# Patient Record
Sex: FEMALE | Race: WHITE | Employment: UNEMPLOYED | URBAN - METROPOLITAN AREA
[De-identification: names, ages, dates, MRNs, and addresses within clinical notes are randomized per-mention and may not be internally consistent; named-entity substitution may affect disease eponyms.]

---

## 2017-01-09 ENCOUNTER — TELEPHONE (OUTPATIENT)
Dept: INTERNAL MEDICINE CLINIC | Age: 82
End: 2017-01-09

## 2017-01-09 NOTE — TELEPHONE ENCOUNTER
Returned patient's call. She states that her sciatica symptoms that she was treated for on 10/14/16 have never improved with medrol dosepack and flexeril. Asking if there is anything else to do. Will forward to PCP.

## 2017-01-11 NOTE — TELEPHONE ENCOUNTER
Please give this patient a referral to Dr. Lora Beck for further evaluation of her spine. Mich Dennis

## 2017-07-17 ENCOUNTER — TELEPHONE (OUTPATIENT)
Dept: INTERNAL MEDICINE CLINIC | Age: 82
End: 2017-07-17

## 2019-08-14 ENCOUNTER — APPOINTMENT (OUTPATIENT)
Dept: CT IMAGING | Age: 84
End: 2019-08-14
Attending: EMERGENCY MEDICINE
Payer: MEDICARE

## 2019-08-14 ENCOUNTER — HOSPITAL ENCOUNTER (EMERGENCY)
Age: 84
Discharge: HOME | End: 2019-08-14
Attending: EMERGENCY MEDICINE | Admitting: EMERGENCY MEDICINE
Payer: MEDICARE

## 2019-08-14 VITALS
SYSTOLIC BLOOD PRESSURE: 185 MMHG | HEART RATE: 88 BPM | DIASTOLIC BLOOD PRESSURE: 82 MMHG | RESPIRATION RATE: 23 BRPM | OXYGEN SATURATION: 100 % | BODY MASS INDEX: 34.1 KG/M2 | TEMPERATURE: 98.1 F | WEIGHT: 152.12 LBS

## 2019-08-14 DIAGNOSIS — R47.9 TRANSIENT SPEECH DISTURBANCE: Primary | ICD-10-CM

## 2019-08-14 LAB
ALBUMIN SERPL-MCNC: 3.7 G/DL (ref 3.5–5)
ALBUMIN/GLOB SERPL: 1 {RATIO} (ref 1.1–2.2)
ALP SERPL-CCNC: 68 U/L (ref 45–117)
ALT SERPL-CCNC: 15 U/L (ref 12–78)
ANION GAP SERPL CALC-SCNC: 8 MMOL/L (ref 5–15)
APPEARANCE UR: CLEAR
AST SERPL-CCNC: 14 U/L (ref 15–37)
ATRIAL RATE: 91 BPM
BACTERIA URNS QL MICRO: ABNORMAL /HPF
BASOPHILS # BLD: 0.1 K/UL (ref 0–0.1)
BASOPHILS NFR BLD: 1 % (ref 0–1)
BILIRUB SERPL-MCNC: 0.5 MG/DL (ref 0.2–1)
BILIRUB UR QL: NEGATIVE
BUN SERPL-MCNC: 18 MG/DL (ref 6–20)
BUN/CREAT SERPL: 16 (ref 12–20)
CALCIUM SERPL-MCNC: 9.7 MG/DL (ref 8.5–10.1)
CALCULATED P AXIS, ECG09: 32 DEGREES
CALCULATED R AXIS, ECG10: -20 DEGREES
CALCULATED T AXIS, ECG11: 97 DEGREES
CHLORIDE SERPL-SCNC: 104 MMOL/L (ref 97–108)
CO2 SERPL-SCNC: 28 MMOL/L (ref 21–32)
COLOR UR: ABNORMAL
CREAT SERPL-MCNC: 1.1 MG/DL (ref 0.55–1.02)
DIAGNOSIS, 93000: NORMAL
DIFFERENTIAL METHOD BLD: NORMAL
EOSINOPHIL # BLD: 0.1 K/UL (ref 0–0.4)
EOSINOPHIL NFR BLD: 1 % (ref 0–7)
EPITH CASTS URNS QL MICRO: ABNORMAL /LPF
ERYTHROCYTE [DISTWIDTH] IN BLOOD BY AUTOMATED COUNT: 12.3 % (ref 11.5–14.5)
GLOBULIN SER CALC-MCNC: 3.8 G/DL (ref 2–4)
GLUCOSE SERPL-MCNC: 155 MG/DL (ref 65–100)
GLUCOSE UR STRIP.AUTO-MCNC: NEGATIVE MG/DL
HCT VFR BLD AUTO: 38.2 % (ref 35–47)
HGB BLD-MCNC: 12.6 G/DL (ref 11.5–16)
HGB UR QL STRIP: NEGATIVE
IMM GRANULOCYTES # BLD AUTO: 0 K/UL (ref 0–0.04)
IMM GRANULOCYTES NFR BLD AUTO: 0 % (ref 0–0.5)
KETONES UR QL STRIP.AUTO: NEGATIVE MG/DL
LEUKOCYTE ESTERASE UR QL STRIP.AUTO: ABNORMAL
LYMPHOCYTES # BLD: 1.7 K/UL (ref 0.8–3.5)
LYMPHOCYTES NFR BLD: 29 % (ref 12–49)
MCH RBC QN AUTO: 31.5 PG (ref 26–34)
MCHC RBC AUTO-ENTMCNC: 33 G/DL (ref 30–36.5)
MCV RBC AUTO: 95.5 FL (ref 80–99)
MONOCYTES # BLD: 0.6 K/UL (ref 0–1)
MONOCYTES NFR BLD: 10 % (ref 5–13)
NEUTS SEG # BLD: 3.4 K/UL (ref 1.8–8)
NEUTS SEG NFR BLD: 59 % (ref 32–75)
NITRITE UR QL STRIP.AUTO: NEGATIVE
NRBC # BLD: 0 K/UL (ref 0–0.01)
NRBC BLD-RTO: 0 PER 100 WBC
OTHER,OTHU: ABNORMAL
P-R INTERVAL, ECG05: 178 MS
PH UR STRIP: 7.5 [PH] (ref 5–8)
PLATELET # BLD AUTO: 194 K/UL (ref 150–400)
PMV BLD AUTO: 11.8 FL (ref 8.9–12.9)
POTASSIUM SERPL-SCNC: 3.7 MMOL/L (ref 3.5–5.1)
PROT SERPL-MCNC: 7.5 G/DL (ref 6.4–8.2)
PROT UR STRIP-MCNC: NEGATIVE MG/DL
Q-T INTERVAL, ECG07: 354 MS
QRS DURATION, ECG06: 82 MS
QTC CALCULATION (BEZET), ECG08: 435 MS
RBC # BLD AUTO: 4 M/UL (ref 3.8–5.2)
RBC #/AREA URNS HPF: ABNORMAL /HPF (ref 0–5)
SODIUM SERPL-SCNC: 140 MMOL/L (ref 136–145)
SP GR UR REFRACTOMETRY: 1.01 (ref 1–1.03)
UA: UC IF INDICATED,UAUC: ABNORMAL
UROBILINOGEN UR QL STRIP.AUTO: 0.2 EU/DL (ref 0.2–1)
VENTRICULAR RATE, ECG03: 91 BPM
WBC # BLD AUTO: 5.8 K/UL (ref 3.6–11)
WBC URNS QL MICRO: ABNORMAL /HPF (ref 0–4)

## 2019-08-14 PROCEDURE — 87086 URINE CULTURE/COLONY COUNT: CPT

## 2019-08-14 PROCEDURE — 93005 ELECTROCARDIOGRAM TRACING: CPT

## 2019-08-14 PROCEDURE — 80053 COMPREHEN METABOLIC PANEL: CPT

## 2019-08-14 PROCEDURE — 36415 COLL VENOUS BLD VENIPUNCTURE: CPT

## 2019-08-14 PROCEDURE — 81001 URINALYSIS AUTO W/SCOPE: CPT

## 2019-08-14 PROCEDURE — 85025 COMPLETE CBC W/AUTO DIFF WBC: CPT

## 2019-08-14 PROCEDURE — 99285 EMERGENCY DEPT VISIT HI MDM: CPT
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
## 2019-08-14 NOTE — ED NOTES
Pt refusing CT scan at this time due to it being an enclosed machine. Explained the differences between a CT and a MRI scan to patient and explained we could giver her something to relax. Patient still does not want it. MD notified.

## 2019-08-14 NOTE — ED NOTES
Dr. Kelly Holt reviewed discharge instructions with the patient. The patient verbalized understanding. All questions and concerns were addressed. The patient declined a wheelchair and is discharged ambulatory in the care of family members with instructions and prescriptions in hand. Pt is alert and oriented x 4. Respirations are clear and unlabored.

## 2019-08-16 LAB
BACTERIA SPEC CULT: NORMAL
CC UR VC: NORMAL
SERVICE CMNT-IMP: NORMAL

## 2021-04-14 NOTE — TELEPHONE ENCOUNTER
Administrations This Visit     ALLERGEN EXTRACT 0.35 mL     Admin Date  04/14/2021  15:57 Action  Given Dose  0.35 mL Route  Subcutaneous Site  Arm Right Administered By  Bernie Johnson MA    Ordering Provider: GENOVEVA Barfield    Patient Supplied?: Yes              Patient tolerated injection well. Patient advised to wait 20 minutes in the office following the injection. No signs/symptoms of reaction noted after 20 minutes. Called patient.   She states that she has switched PCP's to Dr. Sergio Stewart and will not need a referral.

## 2022-01-14 ENCOUNTER — APPOINTMENT (OUTPATIENT)
Dept: CT IMAGING | Age: 87
End: 2022-01-14
Attending: EMERGENCY MEDICINE
Payer: MEDICARE

## 2022-01-14 ENCOUNTER — HOSPITAL ENCOUNTER (OUTPATIENT)
Age: 87
Setting detail: OBSERVATION
Discharge: HOME | End: 2022-01-15
Attending: EMERGENCY MEDICINE | Admitting: INTERNAL MEDICINE
Payer: MEDICARE

## 2022-01-14 ENCOUNTER — APPOINTMENT (OUTPATIENT)
Dept: GENERAL RADIOLOGY | Age: 87
End: 2022-01-14
Attending: EMERGENCY MEDICINE
Payer: MEDICARE

## 2022-01-14 DIAGNOSIS — U07.1 COVID-19: Primary | ICD-10-CM

## 2022-01-14 DIAGNOSIS — I21.4 NSTEMI (NON-ST ELEVATED MYOCARDIAL INFARCTION) (HCC): ICD-10-CM

## 2022-01-14 PROBLEM — R77.8 ELEVATED TROPONIN: Status: ACTIVE | Noted: 2022-01-14

## 2022-01-14 LAB
ALBUMIN SERPL-MCNC: 3.1 G/DL (ref 3.5–5)
ALBUMIN/GLOB SERPL: 0.7 {RATIO} (ref 1.1–2.2)
ALP SERPL-CCNC: 71 U/L (ref 45–117)
ALT SERPL-CCNC: 15 U/L (ref 12–78)
ANION GAP SERPL CALC-SCNC: 8 MMOL/L (ref 5–15)
APPEARANCE UR: CLEAR
AST SERPL-CCNC: 16 U/L (ref 15–37)
ATRIAL RATE: 83 BPM
BACTERIA URNS QL MICRO: NEGATIVE /HPF
BASOPHILS # BLD: 0 K/UL (ref 0–0.1)
BASOPHILS NFR BLD: 0 % (ref 0–1)
BILIRUB SERPL-MCNC: 0.5 MG/DL (ref 0.2–1)
BILIRUB UR QL: NEGATIVE
BUN SERPL-MCNC: 21 MG/DL (ref 6–20)
BUN/CREAT SERPL: 26 (ref 12–20)
CALCIUM SERPL-MCNC: 10.7 MG/DL (ref 8.5–10.1)
CALCULATED P AXIS, ECG09: 50 DEGREES
CALCULATED R AXIS, ECG10: -24 DEGREES
CALCULATED T AXIS, ECG11: 110 DEGREES
CHLORIDE SERPL-SCNC: 99 MMOL/L (ref 97–108)
CO2 SERPL-SCNC: 28 MMOL/L (ref 21–32)
COLOR UR: NORMAL
COVID-19 RAPID TEST, COVR: DETECTED
CREAT SERPL-MCNC: 0.8 MG/DL (ref 0.55–1.02)
D DIMER PPP FEU-MCNC: 3.38 MG/L FEU (ref 0–0.65)
DIAGNOSIS, 93000: NORMAL
DIFFERENTIAL METHOD BLD: ABNORMAL
EOSINOPHIL # BLD: 0 K/UL (ref 0–0.4)
EOSINOPHIL NFR BLD: 0 % (ref 0–7)
EPITH CASTS URNS QL MICRO: NORMAL /LPF
ERYTHROCYTE [DISTWIDTH] IN BLOOD BY AUTOMATED COUNT: 12.4 % (ref 11.5–14.5)
FERRITIN SERPL-MCNC: 236 NG/ML (ref 26–388)
GLOBULIN SER CALC-MCNC: 4.2 G/DL (ref 2–4)
GLUCOSE SERPL-MCNC: 120 MG/DL (ref 65–100)
GLUCOSE UR STRIP.AUTO-MCNC: NEGATIVE MG/DL
HCT VFR BLD AUTO: 36.8 % (ref 35–47)
HGB BLD-MCNC: 12.3 G/DL (ref 11.5–16)
HGB UR QL STRIP: NEGATIVE
IMM GRANULOCYTES # BLD AUTO: 0.1 K/UL (ref 0–0.04)
IMM GRANULOCYTES NFR BLD AUTO: 1 % (ref 0–0.5)
KETONES UR QL STRIP.AUTO: NEGATIVE MG/DL
LACTATE SERPL-SCNC: 0.8 MMOL/L (ref 0.4–2)
LDH SERPL L TO P-CCNC: 230 U/L (ref 81–246)
LEUKOCYTE ESTERASE UR QL STRIP.AUTO: NEGATIVE
LIPASE SERPL-CCNC: 217 U/L (ref 73–393)
LYMPHOCYTES # BLD: 0.8 K/UL (ref 0.8–3.5)
LYMPHOCYTES NFR BLD: 8 % (ref 12–49)
MCH RBC QN AUTO: 30.6 PG (ref 26–34)
MCHC RBC AUTO-ENTMCNC: 33.4 G/DL (ref 30–36.5)
MCV RBC AUTO: 91.5 FL (ref 80–99)
MONOCYTES # BLD: 0.5 K/UL (ref 0–1)
MONOCYTES NFR BLD: 5 % (ref 5–13)
NEUTS SEG # BLD: 9 K/UL (ref 1.8–8)
NEUTS SEG NFR BLD: 86 % (ref 32–75)
NITRITE UR QL STRIP.AUTO: NEGATIVE
NRBC # BLD: 0 K/UL (ref 0–0.01)
NRBC BLD-RTO: 0 PER 100 WBC
P-R INTERVAL, ECG05: 168 MS
PH UR STRIP: 7 [PH] (ref 5–8)
PLATELET # BLD AUTO: 313 K/UL (ref 150–400)
PMV BLD AUTO: 11.2 FL (ref 8.9–12.9)
POTASSIUM SERPL-SCNC: 4 MMOL/L (ref 3.5–5.1)
PROCALCITONIN SERPL-MCNC: <0.05 NG/ML
PROT SERPL-MCNC: 7.3 G/DL (ref 6.4–8.2)
PROT UR STRIP-MCNC: NEGATIVE MG/DL
Q-T INTERVAL, ECG07: 386 MS
QRS DURATION, ECG06: 92 MS
QTC CALCULATION (BEZET), ECG08: 453 MS
RBC # BLD AUTO: 4.02 M/UL (ref 3.8–5.2)
RBC #/AREA URNS HPF: NORMAL /HPF (ref 0–5)
SODIUM SERPL-SCNC: 135 MMOL/L (ref 136–145)
SOURCE, COVRS: ABNORMAL
SP GR UR REFRACTOMETRY: <1.005 (ref 1–1.03)
TROPONIN-HIGH SENSITIVITY: 281 NG/L (ref 0–51)
TROPONIN-HIGH SENSITIVITY: 311 NG/L (ref 0–51)
TROPONIN-HIGH SENSITIVITY: 392 NG/L (ref 0–51)
UA: UC IF INDICATED,UAUC: NORMAL
UROBILINOGEN UR QL STRIP.AUTO: 0.2 EU/DL (ref 0.2–1)
VENTRICULAR RATE, ECG03: 83 BPM
WBC # BLD AUTO: 10.4 K/UL (ref 3.6–11)
WBC URNS QL MICRO: NORMAL /HPF (ref 0–4)

## 2022-01-14 PROCEDURE — 74011000258 HC RX REV CODE- 258: Performed by: INTERNAL MEDICINE

## 2022-01-14 PROCEDURE — G0378 HOSPITAL OBSERVATION PER HR: HCPCS

## 2022-01-14 PROCEDURE — 96372 THER/PROPH/DIAG INJ SC/IM: CPT

## 2022-01-14 PROCEDURE — 85379 FIBRIN DEGRADATION QUANT: CPT

## 2022-01-14 PROCEDURE — 74011250637 HC RX REV CODE- 250/637: Performed by: INTERNAL MEDICINE

## 2022-01-14 PROCEDURE — 99284 EMERGENCY DEPT VISIT MOD MDM: CPT

## 2022-01-14 PROCEDURE — 93005 ELECTROCARDIOGRAM TRACING: CPT

## 2022-01-14 PROCEDURE — 74011250636 HC RX REV CODE- 250/636: Performed by: INTERNAL MEDICINE

## 2022-01-14 PROCEDURE — 84484 ASSAY OF TROPONIN QUANT: CPT

## 2022-01-14 PROCEDURE — 96375 TX/PRO/DX INJ NEW DRUG ADDON: CPT

## 2022-01-14 PROCEDURE — 74011250637 HC RX REV CODE- 250/637: Performed by: EMERGENCY MEDICINE

## 2022-01-14 PROCEDURE — 74011250636 HC RX REV CODE- 250/636: Performed by: EMERGENCY MEDICINE

## 2022-01-14 PROCEDURE — 74011000250 HC RX REV CODE- 250: Performed by: INTERNAL MEDICINE

## 2022-01-14 PROCEDURE — 80053 COMPREHEN METABOLIC PANEL: CPT

## 2022-01-14 PROCEDURE — 74011000636 HC RX REV CODE- 636: Performed by: EMERGENCY MEDICINE

## 2022-01-14 PROCEDURE — 83615 LACTATE (LD) (LDH) ENZYME: CPT

## 2022-01-14 PROCEDURE — 84145 PROCALCITONIN (PCT): CPT

## 2022-01-14 PROCEDURE — 81001 URINALYSIS AUTO W/SCOPE: CPT

## 2022-01-14 PROCEDURE — 83605 ASSAY OF LACTIC ACID: CPT

## 2022-01-14 PROCEDURE — 36415 COLL VENOUS BLD VENIPUNCTURE: CPT

## 2022-01-14 PROCEDURE — 83690 ASSAY OF LIPASE: CPT

## 2022-01-14 PROCEDURE — 82728 ASSAY OF FERRITIN: CPT

## 2022-01-14 PROCEDURE — 99203 OFFICE O/P NEW LOW 30 MIN: CPT | Performed by: INTERNAL MEDICINE

## 2022-01-14 PROCEDURE — 74177 CT ABD & PELVIS W/CONTRAST: CPT

## 2022-01-14 PROCEDURE — 96374 THER/PROPH/DIAG INJ IV PUSH: CPT

## 2022-01-14 PROCEDURE — 71045 X-RAY EXAM CHEST 1 VIEW: CPT

## 2022-01-14 PROCEDURE — 96376 TX/PRO/DX INJ SAME DRUG ADON: CPT

## 2022-01-14 PROCEDURE — 85025 COMPLETE CBC W/AUTO DIFF WBC: CPT

## 2022-01-14 PROCEDURE — 87635 SARS-COV-2 COVID-19 AMP PRB: CPT

## 2022-01-14 RX ORDER — POLYETHYLENE GLYCOL 3350 17 G/17G
17 POWDER, FOR SOLUTION ORAL DAILY PRN
Status: DISCONTINUED | OUTPATIENT
Start: 2022-01-14 | End: 2022-01-15 | Stop reason: HOSPADM

## 2022-01-14 RX ORDER — PRAVASTATIN SODIUM 10 MG/1
20 TABLET ORAL
Status: DISCONTINUED | OUTPATIENT
Start: 2022-01-14 | End: 2022-01-15 | Stop reason: HOSPADM

## 2022-01-14 RX ORDER — CYCLOBENZAPRINE HCL 10 MG
5 TABLET ORAL
Status: DISCONTINUED | OUTPATIENT
Start: 2022-01-14 | End: 2022-01-15 | Stop reason: HOSPADM

## 2022-01-14 RX ORDER — SODIUM CHLORIDE 9 MG/ML
75 INJECTION, SOLUTION INTRAVENOUS CONTINUOUS
Status: DISCONTINUED | OUTPATIENT
Start: 2022-01-14 | End: 2022-01-15

## 2022-01-14 RX ORDER — ACETAMINOPHEN 325 MG/1
650 TABLET ORAL
Status: DISCONTINUED | OUTPATIENT
Start: 2022-01-14 | End: 2022-01-15 | Stop reason: HOSPADM

## 2022-01-14 RX ORDER — ONDANSETRON 2 MG/ML
4 INJECTION INTRAMUSCULAR; INTRAVENOUS
Status: COMPLETED | OUTPATIENT
Start: 2022-01-14 | End: 2022-01-14

## 2022-01-14 RX ORDER — ONDANSETRON 2 MG/ML
4 INJECTION INTRAMUSCULAR; INTRAVENOUS
Status: DISCONTINUED | OUTPATIENT
Start: 2022-01-14 | End: 2022-01-15 | Stop reason: HOSPADM

## 2022-01-14 RX ORDER — ACETAMINOPHEN 650 MG/1
650 SUPPOSITORY RECTAL
Status: DISCONTINUED | OUTPATIENT
Start: 2022-01-14 | End: 2022-01-15 | Stop reason: HOSPADM

## 2022-01-14 RX ORDER — GUAIFENESIN 100 MG/5ML
324 LIQUID (ML) ORAL
Status: COMPLETED | OUTPATIENT
Start: 2022-01-14 | End: 2022-01-14

## 2022-01-14 RX ORDER — GUAIFENESIN/DEXTROMETHORPHAN 100-10MG/5
5 SYRUP ORAL
Status: DISCONTINUED | OUTPATIENT
Start: 2022-01-14 | End: 2022-01-15 | Stop reason: HOSPADM

## 2022-01-14 RX ORDER — HYDROCHLOROTHIAZIDE 25 MG/1
12.5 TABLET ORAL DAILY
Status: DISCONTINUED | OUTPATIENT
Start: 2022-01-14 | End: 2022-01-14

## 2022-01-14 RX ORDER — ASPIRIN 81 MG/1
81 TABLET ORAL DAILY
Status: DISCONTINUED | OUTPATIENT
Start: 2022-01-15 | End: 2022-01-15 | Stop reason: HOSPADM

## 2022-01-14 RX ORDER — LABETALOL HYDROCHLORIDE 5 MG/ML
10 INJECTION, SOLUTION INTRAVENOUS
Status: DISCONTINUED | OUTPATIENT
Start: 2022-01-14 | End: 2022-01-15 | Stop reason: HOSPADM

## 2022-01-14 RX ORDER — ENOXAPARIN SODIUM 100 MG/ML
1 INJECTION SUBCUTANEOUS
Status: COMPLETED | OUTPATIENT
Start: 2022-01-14 | End: 2022-01-14

## 2022-01-14 RX ORDER — ONDANSETRON 4 MG/1
4 TABLET, ORALLY DISINTEGRATING ORAL
Status: DISCONTINUED | OUTPATIENT
Start: 2022-01-14 | End: 2022-01-15 | Stop reason: HOSPADM

## 2022-01-14 RX ORDER — SODIUM CHLORIDE 0.9 % (FLUSH) 0.9 %
5-40 SYRINGE (ML) INJECTION AS NEEDED
Status: DISCONTINUED | OUTPATIENT
Start: 2022-01-14 | End: 2022-01-15 | Stop reason: HOSPADM

## 2022-01-14 RX ORDER — LOSARTAN POTASSIUM 50 MG/1
50 TABLET ORAL DAILY
Status: DISCONTINUED | OUTPATIENT
Start: 2022-01-14 | End: 2022-01-15 | Stop reason: HOSPADM

## 2022-01-14 RX ORDER — ENOXAPARIN SODIUM 100 MG/ML
30 INJECTION SUBCUTANEOUS EVERY 12 HOURS
Status: DISCONTINUED | OUTPATIENT
Start: 2022-01-14 | End: 2022-01-15 | Stop reason: HOSPADM

## 2022-01-14 RX ORDER — HYDROCODONE BITARTRATE AND ACETAMINOPHEN 7.5; 325 MG/1; MG/1
1 TABLET ORAL
Status: DISCONTINUED | OUTPATIENT
Start: 2022-01-14 | End: 2022-01-15 | Stop reason: HOSPADM

## 2022-01-14 RX ORDER — ASPIRIN 81 MG/1
81 TABLET ORAL DAILY
Status: DISCONTINUED | OUTPATIENT
Start: 2022-01-14 | End: 2022-01-14

## 2022-01-14 RX ORDER — SODIUM CHLORIDE 0.9 % (FLUSH) 0.9 %
5-40 SYRINGE (ML) INJECTION EVERY 8 HOURS
Status: DISCONTINUED | OUTPATIENT
Start: 2022-01-14 | End: 2022-01-15 | Stop reason: HOSPADM

## 2022-01-14 RX ADMIN — ENOXAPARIN SODIUM 50 MG: 60 INJECTION SUBCUTANEOUS at 10:50

## 2022-01-14 RX ADMIN — LOSARTAN POTASSIUM 50 MG: 50 TABLET, FILM COATED ORAL at 12:29

## 2022-01-14 RX ADMIN — SODIUM CHLORIDE 1000 ML: 9 INJECTION, SOLUTION INTRAVENOUS at 10:52

## 2022-01-14 RX ADMIN — SODIUM CHLORIDE, PRESERVATIVE FREE 10 ML: 5 INJECTION INTRAVENOUS at 21:49

## 2022-01-14 RX ADMIN — ONDANSETRON 4 MG: 2 INJECTION INTRAMUSCULAR; INTRAVENOUS at 07:53

## 2022-01-14 RX ADMIN — IOPAMIDOL 100 ML: 755 INJECTION, SOLUTION INTRAVENOUS at 08:37

## 2022-01-14 RX ADMIN — SODIUM CHLORIDE 500 ML: 9 INJECTION, SOLUTION INTRAVENOUS at 07:53

## 2022-01-14 RX ADMIN — ENOXAPARIN SODIUM 30 MG: 30 INJECTION SUBCUTANEOUS at 22:23

## 2022-01-14 RX ADMIN — PRAVASTATIN SODIUM 20 MG: 10 TABLET ORAL at 21:49

## 2022-01-14 RX ADMIN — PIPERACILLIN AND TAZOBACTAM 3.38 G: 3; .375 INJECTION, POWDER, LYOPHILIZED, FOR SOLUTION INTRAVENOUS at 17:36

## 2022-01-14 RX ADMIN — PIPERACILLIN AND TAZOBACTAM 3.38 G: 3; .375 INJECTION, POWDER, LYOPHILIZED, FOR SOLUTION INTRAVENOUS at 12:28

## 2022-01-14 RX ADMIN — SODIUM CHLORIDE 75 ML/HR: 9 INJECTION, SOLUTION INTRAVENOUS at 17:37

## 2022-01-14 RX ADMIN — CYCLOBENZAPRINE HYDROCHLORIDE 5 MG: 10 TABLET, FILM COATED ORAL at 21:49

## 2022-01-14 RX ADMIN — ASPIRIN 324 MG: 81 TABLET, CHEWABLE ORAL at 10:49

## 2022-01-14 NOTE — H&P
Hospitalist Admission Note    NAME: Sree Romano   :  1932   MRN:  [id number]     Date/Time:  2022 10:58 AM    Patient PCP: Fermin Cushing, MD  ______________________________________________________________________  Given the patient's current clinical presentation, I have a high level of concern for decompensation if discharged from the emergency department. Complex decision making was performed, which includes reviewing the patient's available past medical records, laboratory results, and x-ray films. My assessment of this patient's clinical condition and my plan of care is as follows. Assessment / Plan:  COVID-19 positive  Bilateral lobe pneumonia concerning for aspiration pneumonia  Nausea  We will admit to telemetry, as needed Zofran, IV fluid. We will start IV Zosyn, check procalcitonin  Patient is not hypoxic, therefore hold starting Decadron  Follow-up inflammatory marker  CT abdomen pelvis showed  . Bilateral lower lobe pneumonia more likely than atelectasis. Consider  aspiration given the moderate hiatal hernia. Recommend chest views. 2. 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 Small bowel enteritis is  infectious, inflammatory, or early ischemic. No pneumatosis or pneumoperitoneum  at this time. 3. 2.1 cm left breast nonspecific mass. Consider dedicated diagnostic breast  imaging as an outpatient if results would change clinical management. 4. Constipation pattern on CT. No bowel obstruction. 5. 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 ?  NSTEMI  Patient presented with nausea, EKG showed T wave inversion in lateral leads.   She denies any chest pain  Status post Lovenox 0.5 mg by the ED  Cardiology consulted, keep n.p.o. until seen by cardiology  If no procedures, continue with the Lovenox    Hyponatremia, mild  Likely due to use of hydrochlorothiazide    Hypertension  Systolic blood pressure 767, will restart BP meds except hydrochlorothiazide  As needed labetalol    Code Status: DNR  Surrogate Decision Maker:  Nader  [phone]       DVT Prophylaxis: Lovenox  GI Prophylaxis: not indicated    Baseline: Ambulatory      Subjective:   CHIEF COMPLAINT: Nausea    HISTORY OF PRESENT ILLNESS:     Sheng Tidwell is a 80 y.o.  female past medical history hypertension, chronic back pain who presents with worsening nausea since yesterday without vomiting. She denies any abdominal pain, diarrhea, fever or chills. She also denies any chest pain  In the ED, she was found to be hypertensive, her labs showed normal CBC and mild hyponatremia on the BMP along with elevated troponin. Her EKG showed T wave inversion on V5 and aVL  Her chest x-ray showed no acute pathology. Her rapid COVID test came back positive  CT abdomen pelvis showed  . Bilateral lower lobe pneumonia more likely than atelectasis. Consider  aspiration given the moderate hiatal hernia. Recommend chest views. 2. 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 Small bowel enteritis is  infectious, inflammatory, or early ischemic. No pneumatosis or pneumoperitoneum  at this time. 3. 2.1 cm left breast nonspecific mass. Consider dedicated diagnostic breast  imaging as an outpatient if results would change clinical management. 4. Constipation pattern on CT. No bowel obstruction. 5. 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 We were asked to admit for work up and evaluation of the above problems.      Past Medical History:   Diagnosis Date    Chronic pain     mid back and left side    Hypercholesterolemia 11/7/2011    Hypertension 11/6/2014        Past Surgical History:   Procedure Laterality Date    HX OTHER SURGICAL      mole removal on right arm       Social History     Tobacco Use    Smoking status: Never Smoker    Smokeless tobacco: Never Used Substance Use Topics    Alcohol use: No        Family History   Problem Relation Age of Onset    Cancer Mother         colon    Heart Attack Father     Kidney Disease Sister         on dialysis     Allergies   Allergen Reactions    Lisinopril Cough        Prior to Admission medications    Medication Sig Start Date End Date Taking? Authorizing Provider   traMADol-acetaminophen (ULTRACET) 37.5-325 mg per tablet Take 1 Tab by mouth every eight (8) hours as needed for Pain. Max Daily Amount: 3 Tabs. 11/1/16   April Mooney MD   losartan (COZAAR) 50 mg tablet Take 1 Tab by mouth daily. For blood pressure 10/14/16   April Mooney MD   hydrochlorothiazide (HYDRODIURIL) 12.5 mg tablet Take 1 Tab by mouth daily. For blood pressure 10/14/16   April Right, MD   cyclobenzaprine (FLEXERIL) 5 mg tablet Take 1 Tab by mouth nightly. Indications: MUSCLE SPASM 10/14/16   April Mooney MD   methylPREDNISolone (MEDROL DOSEPACK) 4 mg tablet Take as directed. 10/14/16   April Mooney MD   diclofenac EC (VOLTAREN) 50 mg EC tablet Take 1 Tab by mouth two (2) times a day. For back pain 8/12/15   Torito Sanabria MD   HYDROcodone-acetaminophen [facility]) 7.5-325 mg per tablet 1-2 every 6 hours as needed for severe leg pain 8/12/15   Torito Sanabria MD   pravastatin (PRAVACHOL) 20 mg tablet Take 1 tablet by mouth nightly. For cholesterol 11/6/14   Torito Sanabria MD   aspirin delayed-release 81 mg tablet Take 1 Tab by mouth daily. 9/30/13   Torito Sanabria MD       REVIEW OF SYSTEMS:     I am not able to complete the review of systems because:    The patient is intubated and sedated    The patient has altered mental status due to his acute medical problems    The patient has baseline aphasia from prior stroke(s)    The patient has baseline dementia and is not reliable historian    The patient is in acute medical distress and unable to provide information           Total of 12 systems reviewed as follows:       POSITIVE= underlined text  Negative = text not underlined  General:  fever, chills, sweats, generalized weakness, weight loss/gain,      loss of appetite   Eyes:    blurred vision, eye pain, loss of vision, double vision  ENT:    rhinorrhea, pharyngitis   Respiratory:   cough, sputum production, SOB, MAY, wheezing, pleuritic pain   Cardiology:   chest pain, palpitations, orthopnea, PND, edema, syncope   Gastrointestinal:  abdominal pain , N/V, diarrhea, dysphagia, constipation, bleeding   Genitourinary:  frequency, urgency, dysuria, hematuria, incontinence   Muskuloskeletal :  arthralgia, myalgia, back pain  Hematology:  easy bruising, nose or gum bleeding, lymphadenopathy   Dermatological: rash, ulceration, pruritis, color change / jaundice  Endocrine:   hot flashes or polydipsia   Neurological:  headache, dizziness, confusion, focal weakness, paresthesia,     Speech difficulties, memory loss, gait difficulty  Psychological: Feelings of anxiety, depression, agitation    Objective:   VITALS:    Visit Vitals  BP (!) 174/76 (BP 1 Location: Left upper arm, BP Patient Position: At rest)   Pulse 91   Temp 97.7 °F (36.5 °C)   Resp 20   Ht 4' 8\" (1.422 m)   Wt 49.9 kg (110 lb)   SpO2 97%   BMI 24.66 kg/m²       PHYSICAL EXAM:    General:    Alert, cooperative, no distress, appears stated age. HEENT: Atraumatic, anicteric sclerae, pink conjunctivae     No oral ulcers, mucosa moist, throat clear, dentition fair  Neck:  Supple, symmetrical,  thyroid: non tender  Lungs:   Clear to auscultation bilaterally. No Wheezing or Rhonchi. No rales. Chest wall:  No tenderness  No Accessory muscle use. Heart:   Regular  rhythm,  No  murmur   No edema  Abdomen:   Soft, non-tender. Not distended. Bowel sounds normal  Extremities: No cyanosis. No clubbing,      Skin turgor normal, Capillary refill normal, Radial dial pulse 2+  Skin:     Not pale. Not Jaundiced  No rashes   Psych:  Good insight. Not depressed.   Not anxious or agitated. Neurologic: EOMs intact. No facial asymmetry. No aphasia or slurred speech. Symmetrical strength, Sensation grossly intact. Alert and oriented X 4.     _______________________________________________________________________  Care Plan discussed with:    Comments   Patient x    Family      RN x    Care Manager                    Consultant:      _______________________________________________________________________  Expected  Disposition:   Home with Family    HH/PT/OT/RN    SNF/LTC    JOVON    ________________________________________________________________________  TOTAL TIME:  79 Minutes    Critical Care Provided     Minutes non procedure based      Comments    x Reviewed previous records   >50% of visit spent in counseling and coordination of care x Discussion with patient and/or family and questions answered       ________________________________________________________________________  Signed: Maribel Vergara MD    Procedures: see electronic medical records for all procedures/Xrays and details which were not copied into this note but were reviewed prior to creation of Plan. LAB DATA REVIEWED:    Recent Results (from the past 24 hour(s))   CBC WITH AUTOMATED DIFF    Collection Time: 01/14/22  7:25 AM   Result Value Ref Range    WBC 10.4 3.6 - 11.0 K/uL    RBC 4.02 3.80 - 5.20 M/uL    HGB 12.3 11.5 - 16.0 g/dL    HCT 36.8 35.0 - 47.0 %    MCV 91.5 80.0 - 99.0 FL    MCH 30.6 26.0 - 34.0 PG    MCHC 33.4 30.0 - 36.5 g/dL    RDW 12.4 11.5 - 14.5 %    PLATELET 459 669 - 749 K/uL    MPV 11.2 8.9 - 12.9 FL    NRBC 0.0 0  WBC    ABSOLUTE NRBC 0.00 0.00 - 0.01 K/uL    NEUTROPHILS 86 (H) 32 - 75 %    LYMPHOCYTES 8 (L) 12 - 49 %    MONOCYTES 5 5 - 13 %    EOSINOPHILS 0 0 - 7 %    BASOPHILS 0 0 - 1 %    IMMATURE GRANULOCYTES 1 (H) 0.0 - 0.5 %    ABS. NEUTROPHILS 9.0 (H) 1.8 - 8.0 K/UL    ABS. LYMPHOCYTES 0.8 0.8 - 3.5 K/UL    ABS. MONOCYTES 0.5 0.0 - 1.0 K/UL    ABS.  EOSINOPHILS 0.0 0.0 - 0.4 K/UL ABS. BASOPHILS 0.0 0.0 - 0.1 K/UL    ABS. IMM. GRANS. 0.1 (H) 0.00 - 0.04 K/UL    DF AUTOMATED     METABOLIC PANEL, COMPREHENSIVE    Collection Time: 01/14/22  7:25 AM   Result Value Ref Range    Sodium 135 (L) 136 - 145 mmol/L    Potassium 4.0 3.5 - 5.1 mmol/L    Chloride 99 97 - 108 mmol/L    CO2 28 21 - 32 mmol/L    Anion gap 8 5 - 15 mmol/L    Glucose 120 (H) 65 - 100 mg/dL    BUN 21 (H) 6 - 20 MG/DL    Creatinine 0.80 0.55 - 1.02 MG/DL    BUN/Creatinine ratio 26 (H) 12 - 20      GFR est AA >60 >60 ml/min/1.73m2    GFR est non-AA >60 >60 ml/min/1.73m2    Calcium 10.7 (H) 8.5 - 10.1 MG/DL    Bilirubin, total 0.5 0.2 - 1.0 MG/DL    ALT (SGPT) 15 12 - 78 U/L    AST (SGOT) 16 15 - 37 U/L    Alk.  phosphatase 71 45 - 117 U/L    Protein, total 7.3 6.4 - 8.2 g/dL    Albumin 3.1 (L) 3.5 - 5.0 g/dL    Globulin 4.2 (H) 2.0 - 4.0 g/dL    A-G Ratio 0.7 (L) 1.1 - 2.2     TROPONIN-HIGH SENSITIVITY    Collection Time: 01/14/22  7:25 AM   Result Value Ref Range    Troponin-High Sensitivity 392 (HH) 0 - 51 ng/L   LACTIC ACID    Collection Time: 01/14/22  7:25 AM   Result Value Ref Range    Lactic acid 0.8 0.4 - 2.0 MMOL/L   LIPASE    Collection Time: 01/14/22  7:25 AM   Result Value Ref Range    Lipase 217 73 - 393 U/L   COVID-19 RAPID TEST    Collection Time: 01/14/22  7:29 AM   Result Value Ref Range    Specimen source Nasopharyngeal      COVID-19 rapid test Detected (A) NOTD     EKG, 12 LEAD, INITIAL    Collection Time: 01/14/22  7:40 AM   Result Value Ref Range    Ventricular Rate 83 BPM    Atrial Rate 83 BPM    P-R Interval 168 ms    QRS Duration 92 ms    Q-T Interval 386 ms    QTC Calculation (Bezet) 453 ms    Calculated P Axis 50 degrees    Calculated R Axis -24 degrees    Calculated T Axis 110 degrees    Diagnosis       Normal sinus rhythm  Possible Left atrial enlargement  Left ventricular hypertrophy with repolarization abnormality    ** Poor data quality, interpretation may be adversely affected  Confirmed by Terry Lafleur Herber Teran M.D. ([id number]) on 1/14/2022 8:57:47 AM

## 2022-01-14 NOTE — ED NOTES
10/15/20 0923   Reason for Consult   Reason for Consult Anxiety   Anxiety Related to   (Surgery)   Patient Intervention(s)   Type of Intervention Performed Preparation;Procedural support  (CCLS verbally explained surgery process to patient when transitioning to OR. CCLS provided support for IV start while in OR.)   Diagnosis/Procedure education Introduction of relaxation/distraction techniques;Pre-procedure teaching for patient/family - individually   Procedural Support Intervention(s) Distraction;Deep breathing;Verbal reassurance  (CCLS utilized conversation as distraction throughout transition to OR and then coached patient through deep breathing techniques during anesthesia induction.)   Support Provided to Family   Support Provided to Family Parent/caregiver(s)  (Mother present)   Parent/Caregiver(s) Intervention Active listening  (CCLS updated mother.)   Anxiety Level   Anxiety Level Patient displays acute distress/anxiety;Patient displays anticipatory anxiety   As evidenced by Patient was crying, and per circulating RN, patient was too nervous for staff to attempt IV start in pre-op room   Evaluation   Patient Behaviors Pre-Interventions Anxious;Tearful   Patient Behaviors Post-Intervention(s)   (Patient appeared more calm and relaxed when prepared by CCLS and then when engaged in conversation as distraction with CCLS)   Persons present Staff     Latanya Westfall, Certified Child Life Specialist   Dr Laura Hankins states patient is now agreeing to stay after he spoke with her

## 2022-01-14 NOTE — ED NOTES
Per transport tech, pt has refused further testing and \"wants to go home\" Dr Estevan Carreon in room speaking with patient

## 2022-01-14 NOTE — ROUTINE PROCESS
TRANSFER - IN REPORT:    Verbal report received from JeroRn(name) on Evonne Ruel  being received from ED(unit) for routine progression of care      Report consisted of patients Situation, Background, Assessment and   Recommendations(SBAR). Information from the following report(s) SBAR, Kardex, Intake/Output, MAR and Accordion was reviewed with the receiving nurse. Opportunity for questions and clarification was provided.

## 2022-01-14 NOTE — CONSULTS
[facility]     Date of  Admission: 1/14/2022  7:38 AM     Admission type:Emergency    Referral for:  Referral by: Subjective:     Yadira Elizabeth is a 80 y.o. female admitted for COVID-19 [U07.1]. Admitted with COVID pneumonia. Found to have elevated troponin. No prior cardiac Hx. Denies CP      Patient Active Problem List    Diagnosis Date Noted    COVID-19 01/14/2022    Elevated troponin 01/14/2022    Sciatica of right side 05/23/2016    Essential hypertension 05/07/2015    Hypercholesterolemia 11/07/2011    Chronic pain       Efraín Cain MD  Past Medical History:   Diagnosis Date    Chronic pain     mid back and left side    Hypercholesterolemia 11/7/2011    Hypertension 11/6/2014      Social History     Socioeconomic History    Marital status:     Number of children: 5   Tobacco Use    Smoking status: Never Smoker    Smokeless tobacco: Never Used   Substance and Sexual Activity    Alcohol use: No    Drug use: No   Social History Narrative     for 61 years, 5 children.  9 grandchildren, 12 great grandchildren     Allergies   Allergen Reactions    Lisinopril Cough      Family History   Problem Relation Age of Onset    Cancer Mother         colon    Heart Attack Father     Kidney Disease Sister         on dialysis      Current Facility-Administered Medications   Medication Dose Route Frequency    cyclobenzaprine (FLEXERIL) tablet 5 mg  5 mg Oral QHS    HYDROcodone-acetaminophen (NORCO) 7.5-325 mg per tablet 1 Tablet  1 Tablet Oral Q6H PRN    losartan (COZAAR) tablet 50 mg  50 mg Oral DAILY    pravastatin (PRAVACHOL) tablet 20 mg  20 mg Oral QHS    sodium chloride (NS) flush 5-40 mL  5-40 mL IntraVENous Q8H    sodium chloride (NS) flush 5-40 mL  5-40 mL IntraVENous PRN    acetaminophen (TYLENOL) tablet 650 mg  650 mg Oral Q6H PRN    Or    acetaminophen (TYLENOL) suppository 650 mg  650 mg Rectal Q6H PRN    polyethylene glycol (MIRALAX) packet 17 g  17 g Oral DAILY PRN    ondansetron (ZOFRAN ODT) tablet 4 mg  4 mg Oral Q8H PRN    Or    ondansetron (ZOFRAN) injection 4 mg  4 mg IntraVENous Q6H PRN    enoxaparin (LOVENOX) injection 30 mg  30 mg SubCUTAneous Q12H    guaiFENesin-dextromethorphan (ROBITUSSIN DM) 100-10 mg/5 mL syrup 5 mL  5 mL Oral Q4H PRN    [START ON 1/15/2022] aspirin delayed-release tablet 81 mg  81 mg Oral DAILY    piperacillin-tazobactam (ZOSYN) 3.375 g in 0.9% sodium chloride (MBP/ADV) 100 mL MBP  3.375 g IntraVENous Q8H    0.9% sodium chloride infusion  75 mL/hr IntraVENous CONTINUOUS    labetaloL (NORMODYNE;TRANDATE) injection 10 mg  10 mg IntraVENous Q4H PRN     Current Outpatient Medications   Medication Sig    traMADol-acetaminophen (ULTRACET) 37.5-325 mg per tablet Take 1 Tab by mouth every eight (8) hours as needed for Pain. Max Daily Amount: 3 Tabs.  losartan (COZAAR) 50 mg tablet Take 1 Tab by mouth daily. For blood pressure    hydrochlorothiazide (HYDRODIURIL) 12.5 mg tablet Take 1 Tab by mouth daily. For blood pressure    cyclobenzaprine (FLEXERIL) 5 mg tablet Take 1 Tab by mouth nightly. Indications: MUSCLE SPASM    methylPREDNISolone (MEDROL DOSEPACK) 4 mg tablet Take as directed.  diclofenac EC (VOLTAREN) 50 mg EC tablet Take 1 Tab by mouth two (2) times a day. For back pain    HYDROcodone-acetaminophen (NORCO) 7.5-325 mg per tablet 1-2 every 6 hours as needed for severe leg pain    pravastatin (PRAVACHOL) 20 mg tablet Take 1 tablet by mouth nightly. For cholesterol    aspirin delayed-release 81 mg tablet Take 1 Tab by mouth daily.           Review of Symptoms:  Constitutional: negative  Eyes: negative  Ears, nose, mouth, throat, and face: negative  Respiratory: negative  Cardiovascular: negative  Gastrointestinal: negative  Genitourinary:negative  Musculoskeletal:negative  Neurological: negative  Behvioral/Psych: negative  Endocrine: negative            Objective: Visit Vitals  BP (!) 161/62   Pulse 74   Temp 97.7 °F (36.5 °C)   Resp 20   Ht 4' 8\" (1.422 m)   Wt 110 lb (49.9 kg)   SpO2 97%   BMI 24.66 kg/m²             Data Review:   Recent Labs     01/14/22  0725   WBC 10.4   HGB 12.3   HCT 36.8        Recent Labs     01/14/22  0725   *   K 4.0   CL 99   CO2 28   *   BUN 21*   CREA 0.80   CA 10.7*   ALB 3.1*   TBILI 0.5   ALT 15       Recent Labs     01/14/22  1205 01/14/22  0725   TROPHS 311* 392*       No intake or output data in the 24 hours ending 01/14/22 1521     Cardiographics    Telemetry:   ECG: nonspecific changes, looks better compared to 2019  Echocardiogram:   CXRAY:       Assessment:       Active Problems:    COVID-19 (1/14/2022)      Elevated troponin (1/14/2022)         Plan:     COVID related trop without signs of ACS. Echo and conservative management.     Parth Lanier MD

## 2022-01-14 NOTE — ED NOTES
Patient is being transferred to [address], Room # 2119. Report given to Aiken RN on Sree Romano for routine progression of care. Report consisted of the following information SBAR, ED Summary, Intake/Output, MAR and Cardiac Rhythm sinur rhythm . Patient transferred to receiving unit by: Transport team (RN or tech name). Outstanding consults needed: No     Next labs due: No     The following personal items will be sent with the patient during transfer to the floor: All valuables:    Cardiac monitoring ordered: Yes    The following CURRENT information was reported to the receiving RN:    Code status: DNR at time of transfer    Last set of vital signs:  Vital Signs  Level of Consciousness: Alert (0) (01/14/22 0429)  Temp: 97.7 °F (36.5 °C) (01/14/22 0429)  Temp Source: Oral (01/14/22 0429)  Pulse (Heart Rate): 74 (01/14/22 1229)  Resp Rate: 20 (01/14/22 0429)  BP: (!) 161/62 (01/14/22 1300)  MAP (Monitor): 86 (01/14/22 1300)  MAP (Calculated): 95 (01/14/22 1300)  BP 1 Location: Left upper arm (01/14/22 0429)  BP 1 Method: Automatic (01/14/22 0429)  BP Patient Position: At rest (01/14/22 0429)  MEWS Score: 1 (01/14/22 0429)         Oxygen Therapy  O2 Sat (%): 97 % (01/14/22 0429)  O2 Device: None (Room air) (01/14/22 0429)      Last pain assessment:  Pain 1  Pain Scale 1: Numeric (0 - 10)  Pain Intensity 1: 0      Wounds: No     Urinary catheter: voiding  Is there a desai order: No     LDAs:       Peripheral IV 01/14/22 Right Antecubital (Active)   Site Assessment Clean, dry, & intact 01/14/22 0733   Phlebitis Assessment 0 01/14/22 0733   Infiltration Assessment 0 01/14/22 0733   Dressing Status Clean, dry, & intact 01/14/22 0733   Dressing Type Transparent 01/14/22 0733   Hub Color/Line Status Pink 01/14/22 0733   Action Taken Blood drawn 01/14/22 1915         Opportunity for questions and clarification was provided.     David Randhawa RN

## 2022-01-14 NOTE — ED PROVIDER NOTES
EMERGENCY DEPARTMENT HISTORY AND PHYSICAL EXAM      Date: 1/14/2022  Patient Name: Ghislaine Gannon    Please note that this dictation was completed with [employer], the computer voice recognition software. Quite often unanticipated grammatical, syntax, homophones, and other interpretive errors are inadvertently transcribed by the computer software. Please disregard these errors. Please excuse any errors that have escaped final proofreading. History of Presenting Illness     Chief Complaint   Patient presents with    Constipation     Patient to triage w EMS after her PCP put her on metamucil and since she has not had a BM. History Provided By: Patient     HPI: Ghislaine Gannon, 80 y.o. female, with a past medical history significant for hypertension, hyperlipidemia presenting the emergency department complaining of abdominal pain, nausea, decreased stool output. Last bowel movement was about 2 days ago. Reports decrease in flatus. Feels nauseous, but has not vomited. Denies any fever. Denies any cough. Nothing makes her symptoms better or worse. No urinary symptoms. PCP: Shelley Suresh MD    No current facility-administered medications on file prior to encounter. Current Outpatient Medications on File Prior to Encounter   Medication Sig Dispense Refill    traMADol-acetaminophen (ULTRACET) 37.5-325 mg per tablet Take 1 Tab by mouth every eight (8) hours as needed for Pain. Max Daily Amount: 3 Tabs. 30 Tab 0    losartan (COZAAR) 50 mg tablet Take 1 Tab by mouth daily. For blood pressure 90 Tab 2    cyclobenzaprine (FLEXERIL) 5 mg tablet Take 1 Tab by mouth nightly. Indications: MUSCLE SPASM 20 Tab 1    pravastatin (PRAVACHOL) 20 mg tablet Take 1 tablet by mouth nightly. For cholesterol 90 tablet 3    aspirin delayed-release 81 mg tablet Take 1 Tab by mouth daily.          Past History     Past Medical History:  Past Medical History:   Diagnosis Date    Chronic pain     mid back and left side  Hypercholesterolemia 11/7/2011    Hypertension 11/6/2014       Past Surgical History:  Past Surgical History:   Procedure Laterality Date    HX OTHER SURGICAL      mole removal on right arm       Family History:  Family History   Problem Relation Age of Onset    Cancer Mother         colon    Heart Attack Father     Kidney Disease Sister         on dialysis       Social History:  Social History     Tobacco Use    Smoking status: Never Smoker    Smokeless tobacco: Never Used   Substance Use Topics    Alcohol use: No    Drug use: No       Allergies: Allergies   Allergen Reactions    Lisinopril Cough         Review of Systems   Review of Systems   Constitutional: Positive for fatigue. Negative for chills and fever. HENT: Negative for congestion and sore throat. Eyes: Negative for visual disturbance. Respiratory: Negative for cough and shortness of breath. Cardiovascular: Negative for chest pain and leg swelling. Gastrointestinal: Positive for abdominal pain and nausea. Negative for blood in stool, diarrhea and vomiting. Endocrine: Negative for polyuria. Genitourinary: Negative for dysuria, flank pain, vaginal bleeding and vaginal discharge. Musculoskeletal: Negative for myalgias. Skin: Negative for rash. Allergic/Immunologic: Negative for immunocompromised state. Neurological: Negative for weakness and headaches. Psychiatric/Behavioral: Negative for confusion. Physical Exam   Physical Exam  Vitals and nursing note reviewed. Constitutional:       Appearance: She is well-developed. HENT:      Head: Normocephalic and atraumatic. Eyes:      General:         Right eye: No discharge. Left eye: No discharge. Conjunctiva/sclera: Conjunctivae normal.      Pupils: Pupils are equal, round, and reactive to light. Neck:      Trachea: No tracheal deviation. Cardiovascular:      Rate and Rhythm: Normal rate and regular rhythm.       Heart sounds: Normal heart sounds. No murmur heard. Pulmonary:      Effort: Pulmonary effort is normal. No respiratory distress. Breath sounds: Normal breath sounds. No wheezing or rales. Abdominal:      General: Bowel sounds are normal. There is distension. Palpations: Abdomen is soft. Tenderness: There is no abdominal tenderness. There is no guarding or rebound. Musculoskeletal:         General: No tenderness or deformity. Normal range of motion. Cervical back: Normal range of motion and neck supple. Skin:     General: Skin is warm and dry. Findings: No erythema or rash. Neurological:      Mental Status: She is alert and oriented to person, place, and time. Psychiatric:         Behavior: Behavior normal.         Diagnostic Study Results     Labs -   No results found for this or any previous visit (from the past 12 hour(s)). Radiologic Studies -   XR CHEST PORT   Final Result   1. Clear lungs other than minimal bibasilar probable atelectasis. 2. No pulmonary edema. CT ABD PELV W CONT   Final Result      1. Bilateral lower lobe pneumonia more likely than atelectasis. Consider   aspiration given the moderate hiatal hernia. Recommend chest views. 2. 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 Small bowel enteritis is   infectious, inflammatory, or early ischemic. No pneumatosis or pneumoperitoneum   at this time. 3. 2.1 cm left breast nonspecific mass. Consider dedicated diagnostic breast   imaging as an outpatient if results would change clinical management. 4. Constipation pattern on CT. No bowel obstruction. 5. 2 cm pancreatic head cystic lesion is of doubtful clinical significance in   this age group. CT Results  (Last 48 hours)    None        CXR Results  (Last 48 hours)    None            Medical Decision Making   I am the first provider for this patient.     I reviewed the vital signs, available nursing notes, past medical history, past surgical history, family history and social history. Vital Signs-Reviewed the patient's vital signs. No data found. EKG interpretation: (Preliminary)  EKG shows sinus rhythm, rate 83. Leftward axis. Lateral ST changes possibly ischemic. No evidence of ST elevation myocardial infarction. Interpreted by me    Records Reviewed:   Nursing notes, Prior visits     Provider Notes (Medical Decision Making):   Patient presents with abdominal pain. DDx: Gastroenteritis, SBO, appendicitis, colitis, IBD, diverticulitis, mesenteric ischemia, AAA or descending dissection, ACS, ureteral stone. Will get labs and CT Abdomen/pelvis. ED Course:   Initial assessment performed. The patients presenting problems have been discussed, and they are in agreement with the care plan formulated and outlined with them. I have encouraged them to ask questions as they arise throughout their visit. ED Course as of 01/16/22 2152   Fri Jan 14, 2022   0810 Patient Positive for COVID-19, will add on a chest x-ray, patient not in any respiratory distress [AR]      ED Course User Index  [AR] Dominick Oka, DO         8:30 AM.  Patient found to have COVID, troponin is also elevated. Will cover with aspirin. Will probably give dose of Lovenox, not having any chest pain, no concern for ST elevation myocardial infarction on ECG. Will admit for NSTEMI      Critical Care Time:   none    Disposition:    Patient is being admitted to the hospital by Dr. Radha Lee. The results of their tests and reasons for their admission have been discussed with them and/or available family. They convey agreement and understanding for the need to be admitted and for their admission diagnosis. Consultation has been made with the inpatient physician specialist for hospitalization. PLAN:  1.    Discharge Medication List as of 1/15/2022  4:57 PM      START taking these medications    Details   ascorbic acid, vitamin C, (VITAMIN C) 500 mg tablet Take 1 Tablet by mouth two (2) times a day for 30 days. , Normal, Disp-60 Tablet, R-0         CONTINUE these medications which have NOT CHANGED    Details   traMADol-acetaminophen (ULTRACET) 37.5-325 mg per tablet Take 1 Tab by mouth every eight (8) hours as needed for Pain. Max Daily Amount: 3 Tabs., PrintDo not take with flexeril or hydrocodone. Disp-30 Tab, R-0      losartan (COZAAR) 50 mg tablet Take 1 Tab by mouth daily. For blood pressure, Normal, Disp-90 Tab, R-2      cyclobenzaprine (FLEXERIL) 5 mg tablet Take 1 Tab by mouth nightly. Indications: MUSCLE SPASM, Normal, Disp-20 Tab, R-1      pravastatin (PRAVACHOL) 20 mg tablet Take 1 tablet by mouth nightly. For cholesterol, Normal, Disp-90 tablet, R-3      aspirin delayed-release 81 mg tablet Take 1 Tab by mouth daily. , Historical Med         STOP taking these medications       hydrochlorothiazide (HYDRODIURIL) 12.5 mg tablet Comments:   Reason for Stopping:         methylPREDNISolone (MEDROL DOSEPACK) 4 mg tablet Comments:   Reason for Stopping:         diclofenac EC (VOLTAREN) 50 mg EC tablet Comments:   Reason for Stopping:         HYDROcodone-acetaminophen ([address]) 7.5-325 mg per tablet Comments:   Reason for Stoppin.   Follow-up Information     Follow up With Specialties Details Why Contact Info    Benito Francis MD Family Medicine   [address]  [facility] [address] [place] [address]  [phone]            Return to ED if worse     Diagnosis     Clinical Impression:   1. COVID-19    2. NSTEMI (non-ST elevated myocardial infarction) [facility])        Attestations:   This note was completed by Colletta Meier, DO

## 2022-01-14 NOTE — ACP (ADVANCE CARE PLANNING)
Advance Care Planning Note      NAME: Matheus Chen   :  1932   MRN:  [id number]     Date/Time:  2022 11:09 AM    Active Diagnoses:  Hospital Problems  Date Reviewed: 2016          Codes Class Noted POA    COVID-19 ICD-10-CM: U07.1  ICD-9-CM: 079.89  2022 Unknown              These active diagnoses are of sufficient risk that focused discussion on advance care planning is indicated in order to allow the patient to thoughtfully consider personal goals of care, and if situations arise that prevent the ability to personally give input, to ensure appropriate representation of their personal desires for different levels and aggressiveness of care. Discussion:   Code status addressed and wants to be a DNR / DNI. Patient wants central line and vasopressors if needed. Patient would also want a feeding tube, if needed, for nutritional support. Patient  would like to assign her son  radha dixon  [phone]        as the surrogate decision maker. Persons present and participating in discussion: Karen Chavez MD,       Time Spent:   Total time spent face-to-face in education and discussion:   16 minutes.          Karen Quispe MD   Hospitalist

## 2022-01-15 ENCOUNTER — APPOINTMENT (OUTPATIENT)
Dept: NON INVASIVE DIAGNOSTICS | Age: 87
End: 2022-01-15
Attending: INTERNAL MEDICINE
Payer: MEDICARE

## 2022-01-15 VITALS
WEIGHT: 110.01 LBS | BODY MASS INDEX: 24.75 KG/M2 | OXYGEN SATURATION: 99 % | SYSTOLIC BLOOD PRESSURE: 131 MMHG | HEIGHT: 56 IN | RESPIRATION RATE: 18 BRPM | HEART RATE: 74 BPM | DIASTOLIC BLOOD PRESSURE: 72 MMHG | TEMPERATURE: 97.7 F

## 2022-01-15 LAB
ALBUMIN SERPL-MCNC: 2.8 G/DL (ref 3.5–5)
ALBUMIN/GLOB SERPL: 0.8 {RATIO} (ref 1.1–2.2)
ALP SERPL-CCNC: 56 U/L (ref 45–117)
ALT SERPL-CCNC: 15 U/L (ref 12–78)
ANION GAP SERPL CALC-SCNC: 5 MMOL/L (ref 5–15)
AST SERPL-CCNC: 19 U/L (ref 15–37)
BASOPHILS # BLD: 0 K/UL (ref 0–0.1)
BASOPHILS NFR BLD: 1 % (ref 0–1)
BILIRUB SERPL-MCNC: 0.7 MG/DL (ref 0.2–1)
BUN SERPL-MCNC: 16 MG/DL (ref 6–20)
BUN/CREAT SERPL: 16 (ref 12–20)
CALCIUM SERPL-MCNC: 9.3 MG/DL (ref 8.5–10.1)
CHLORIDE SERPL-SCNC: 108 MMOL/L (ref 97–108)
CO2 SERPL-SCNC: 29 MMOL/L (ref 21–32)
CREAT SERPL-MCNC: 0.97 MG/DL (ref 0.55–1.02)
D DIMER PPP FEU-MCNC: 2.52 MG/L FEU (ref 0–0.65)
DIFFERENTIAL METHOD BLD: ABNORMAL
EOSINOPHIL # BLD: 0.1 K/UL (ref 0–0.4)
EOSINOPHIL NFR BLD: 2 % (ref 0–7)
ERYTHROCYTE [DISTWIDTH] IN BLOOD BY AUTOMATED COUNT: 12.4 % (ref 11.5–14.5)
FIBRINOGEN PPP-MCNC: 296 MG/DL (ref 200–475)
GLOBULIN SER CALC-MCNC: 3.5 G/DL (ref 2–4)
GLUCOSE SERPL-MCNC: 78 MG/DL (ref 65–100)
HCT VFR BLD AUTO: 33.9 % (ref 35–47)
HGB BLD-MCNC: 11 G/DL (ref 11.5–16)
IMM GRANULOCYTES # BLD AUTO: 0 K/UL (ref 0–0.04)
IMM GRANULOCYTES NFR BLD AUTO: 1 % (ref 0–0.5)
INR PPP: 1.1 (ref 0.9–1.1)
LYMPHOCYTES # BLD: 1.2 K/UL (ref 0.8–3.5)
LYMPHOCYTES NFR BLD: 20 % (ref 12–49)
MCH RBC QN AUTO: 30.7 PG (ref 26–34)
MCHC RBC AUTO-ENTMCNC: 32.4 G/DL (ref 30–36.5)
MCV RBC AUTO: 94.7 FL (ref 80–99)
MONOCYTES # BLD: 0.5 K/UL (ref 0–1)
MONOCYTES NFR BLD: 8 % (ref 5–13)
NEUTS SEG # BLD: 4.1 K/UL (ref 1.8–8)
NEUTS SEG NFR BLD: 68 % (ref 32–75)
NRBC # BLD: 0 K/UL (ref 0–0.01)
NRBC BLD-RTO: 0 PER 100 WBC
PLATELET # BLD AUTO: 278 K/UL (ref 150–400)
PMV BLD AUTO: 11.3 FL (ref 8.9–12.9)
POTASSIUM SERPL-SCNC: 3.8 MMOL/L (ref 3.5–5.1)
PROT SERPL-MCNC: 6.3 G/DL (ref 6.4–8.2)
PROTHROMBIN TIME: 11.4 SEC (ref 9–11.1)
RBC # BLD AUTO: 3.58 M/UL (ref 3.8–5.2)
SODIUM SERPL-SCNC: 142 MMOL/L (ref 136–145)
TROPONIN-HIGH SENSITIVITY: 429 NG/L (ref 0–51)
WBC # BLD AUTO: 5.9 K/UL (ref 3.6–11)

## 2022-01-15 PROCEDURE — 85379 FIBRIN DEGRADATION QUANT: CPT

## 2022-01-15 PROCEDURE — 96376 TX/PRO/DX INJ SAME DRUG ADON: CPT

## 2022-01-15 PROCEDURE — G0378 HOSPITAL OBSERVATION PER HR: HCPCS

## 2022-01-15 PROCEDURE — 74011000258 HC RX REV CODE- 258: Performed by: INTERNAL MEDICINE

## 2022-01-15 PROCEDURE — 85384 FIBRINOGEN ACTIVITY: CPT

## 2022-01-15 PROCEDURE — 93306 TTE W/DOPPLER COMPLETE: CPT

## 2022-01-15 PROCEDURE — 74011250637 HC RX REV CODE- 250/637: Performed by: INTERNAL MEDICINE

## 2022-01-15 PROCEDURE — 96372 THER/PROPH/DIAG INJ SC/IM: CPT

## 2022-01-15 PROCEDURE — 74011000250 HC RX REV CODE- 250: Performed by: INTERNAL MEDICINE

## 2022-01-15 PROCEDURE — 36415 COLL VENOUS BLD VENIPUNCTURE: CPT

## 2022-01-15 PROCEDURE — 99212 OFFICE O/P EST SF 10 MIN: CPT | Performed by: INTERNAL MEDICINE

## 2022-01-15 PROCEDURE — 80053 COMPREHEN METABOLIC PANEL: CPT

## 2022-01-15 PROCEDURE — 96375 TX/PRO/DX INJ NEW DRUG ADDON: CPT

## 2022-01-15 PROCEDURE — 85610 PROTHROMBIN TIME: CPT

## 2022-01-15 PROCEDURE — 84484 ASSAY OF TROPONIN QUANT: CPT

## 2022-01-15 PROCEDURE — 74011250636 HC RX REV CODE- 250/636: Performed by: INTERNAL MEDICINE

## 2022-01-15 PROCEDURE — 74011250637 HC RX REV CODE- 250/637: Performed by: STUDENT IN AN ORGANIZED HEALTH CARE EDUCATION/TRAINING PROGRAM

## 2022-01-15 PROCEDURE — 85025 COMPLETE CBC W/AUTO DIFF WBC: CPT

## 2022-01-15 RX ORDER — GUAIFENESIN 100 MG/5ML
100 SOLUTION ORAL
Status: DISCONTINUED | OUTPATIENT
Start: 2022-01-15 | End: 2022-01-15 | Stop reason: HOSPADM

## 2022-01-15 RX ORDER — ASCORBIC ACID 500 MG
500 TABLET ORAL 2 TIMES DAILY
Qty: 60 TABLET | Refills: 0 | Status: SHIPPED | OUTPATIENT
Start: 2022-01-15 | End: 2022-02-14

## 2022-01-15 RX ORDER — ASCORBIC ACID 500 MG
500 TABLET ORAL 2 TIMES DAILY
Status: DISCONTINUED | OUTPATIENT
Start: 2022-01-15 | End: 2022-01-15 | Stop reason: HOSPADM

## 2022-01-15 RX ADMIN — OXYCODONE HYDROCHLORIDE AND ACETAMINOPHEN 500 MG: 500 TABLET ORAL at 09:20

## 2022-01-15 RX ADMIN — PIPERACILLIN AND TAZOBACTAM 3.38 G: 3; .375 INJECTION, POWDER, LYOPHILIZED, FOR SOLUTION INTRAVENOUS at 09:16

## 2022-01-15 RX ADMIN — OXYCODONE HYDROCHLORIDE AND ACETAMINOPHEN 500 MG: 500 TABLET ORAL at 18:07

## 2022-01-15 RX ADMIN — SODIUM CHLORIDE, PRESERVATIVE FREE 10 ML: 5 INJECTION INTRAVENOUS at 06:32

## 2022-01-15 RX ADMIN — ENOXAPARIN SODIUM 30 MG: 30 INJECTION SUBCUTANEOUS at 13:24

## 2022-01-15 RX ADMIN — LABETALOL HYDROCHLORIDE 10 MG: 5 INJECTION INTRAVENOUS at 09:18

## 2022-01-15 RX ADMIN — SODIUM CHLORIDE 75 ML/HR: 9 INJECTION, SOLUTION INTRAVENOUS at 00:40

## 2022-01-15 RX ADMIN — ASPIRIN 81 MG: 81 TABLET, COATED ORAL at 09:20

## 2022-01-15 RX ADMIN — LOSARTAN POTASSIUM 50 MG: 50 TABLET, FILM COATED ORAL at 09:16

## 2022-01-15 RX ADMIN — PIPERACILLIN AND TAZOBACTAM 3.38 G: 3; .375 INJECTION, POWDER, LYOPHILIZED, FOR SOLUTION INTRAVENOUS at 00:40

## 2022-01-15 NOTE — PROGRESS NOTES
1/15/2022 9:08 AM    Admit Date: 1/14/2022    Admit Diagnosis: COVID-19 [U07.1]    Subjective:     No cardiac events.     Visit Vitals  BP (!) 180/83 (BP 1 Location: Left upper arm, BP Patient Position: At rest)   Pulse 71   Temp 97.5 °F (36.4 °C)   Resp 17   Ht 4' 8\" (1.422 m)   Wt 110 lb (49.9 kg)   SpO2 100%   BMI 24.66 kg/m²     Current Facility-Administered Medications   Medication Dose Route Frequency    ascorbic acid (vitamin C) (VITAMIN C) tablet 500 mg  500 mg Oral BID    guaiFENesin (ROBITUSSIN) 100 mg/5 mL oral liquid 100 mg  100 mg Oral Q4H PRN    cyclobenzaprine (FLEXERIL) tablet 5 mg  5 mg Oral QHS    HYDROcodone-acetaminophen (NORCO) 7.5-325 mg per tablet 1 Tablet  1 Tablet Oral Q6H PRN    losartan (COZAAR) tablet 50 mg  50 mg Oral DAILY    pravastatin (PRAVACHOL) tablet 20 mg  20 mg Oral QHS    sodium chloride (NS) flush 5-40 mL  5-40 mL IntraVENous Q8H    sodium chloride (NS) flush 5-40 mL  5-40 mL IntraVENous PRN    acetaminophen (TYLENOL) tablet 650 mg  650 mg Oral Q6H PRN    Or    acetaminophen (TYLENOL) suppository 650 mg  650 mg Rectal Q6H PRN    polyethylene glycol (MIRALAX) packet 17 g  17 g Oral DAILY PRN    ondansetron (ZOFRAN ODT) tablet 4 mg  4 mg Oral Q8H PRN    Or    ondansetron (ZOFRAN) injection 4 mg  4 mg IntraVENous Q6H PRN    enoxaparin (LOVENOX) injection 30 mg  30 mg SubCUTAneous Q12H    guaiFENesin-dextromethorphan (ROBITUSSIN DM) 100-10 mg/5 mL syrup 5 mL  5 mL Oral Q4H PRN    aspirin delayed-release tablet 81 mg  81 mg Oral DAILY    piperacillin-tazobactam (ZOSYN) 3.375 g in 0.9% sodium chloride (MBP/ADV) 100 mL MBP  3.375 g IntraVENous Q8H    labetaloL (NORMODYNE;TRANDATE) injection 10 mg  10 mg IntraVENous Q4H PRN         Objective:      Visit Vitals  BP (!) 180/83 (BP 1 Location: Left upper arm, BP Patient Position: At rest)   Pulse 71   Temp 97.5 °F (36.4 °C)   Resp 17   Ht 4' 8\" (1.422 m)   Wt 110 lb (49.9 kg)   SpO2 100%   BMI 24.66 kg/m²     Data Review:   Labs:    Recent Results (from the past 24 hour(s))   URINALYSIS W/ REFLEX CULTURE    Collection Time: 01/14/22 12:05 PM    Specimen: Urine   Result Value Ref Range    Color YELLOW/STRAW      Appearance CLEAR CLEAR      Specific gravity <1.005 1.003 - 1.030    pH (UA) 7.0 5.0 - 8.0      Protein Negative NEG mg/dL    Glucose Negative NEG mg/dL    Ketone Negative NEG mg/dL    Bilirubin Negative NEG      Blood Negative NEG      Urobilinogen 0.2 0.2 - 1.0 EU/dL    Nitrites Negative NEG      Leukocyte Esterase Negative NEG      WBC 0-4 0 - 4 /hpf    RBC 0-5 0 - 5 /hpf    Epithelial cells FEW FEW /lpf    Bacteria Negative NEG /hpf    UA:UC IF INDICATED CULTURE NOT INDICATED BY UA RESULT CNI     PROCALCITONIN    Collection Time: 01/14/22 12:05 PM   Result Value Ref Range    Procalcitonin <0.05 ng/mL   D DIMER    Collection Time: 01/14/22 12:05 PM   Result Value Ref Range    D-dimer 3.38 (H) 0.00 - 0.65 mg/L FEU   FERRITIN    Collection Time: 01/14/22 12:05 PM   Result Value Ref Range    Ferritin 236 26 - 388 NG/ML   LD    Collection Time: 01/14/22 12:05 PM   Result Value Ref Range     81 - 246 U/L   TROPONIN-HIGH SENSITIVITY    Collection Time: 01/14/22 12:05 PM   Result Value Ref Range    Troponin-High Sensitivity 311 (HH) 0 - 51 ng/L   TROPONIN-HIGH SENSITIVITY    Collection Time: 01/14/22  5:45 PM   Result Value Ref Range    Troponin-High Sensitivity 281 (HH) 0 - 51 ng/L   TROPONIN-HIGH SENSITIVITY    Collection Time: 01/15/22  3:08 AM   Result Value Ref Range    Troponin-High Sensitivity 429 (HH) 0 - 51 ng/L   CBC WITH AUTOMATED DIFF    Collection Time: 01/15/22  3:08 AM   Result Value Ref Range    WBC 5.9 3.6 - 11.0 K/uL    RBC 3.58 (L) 3.80 - 5.20 M/uL    HGB 11.0 (L) 11.5 - 16.0 g/dL    HCT 33.9 (L) 35.0 - 47.0 %    MCV 94.7 80.0 - 99.0 FL    MCH 30.7 26.0 - 34.0 PG    MCHC 32.4 30.0 - 36.5 g/dL    RDW 12.4 11.5 - 14.5 %    PLATELET 493 643 - 674 K/uL    MPV 11.3 8.9 - 12.9 FL    NRBC 0.0 0  WBC    ABSOLUTE NRBC 0.00 0.00 - 0.01 K/uL    NEUTROPHILS 68 32 - 75 %    LYMPHOCYTES 20 12 - 49 %    MONOCYTES 8 5 - 13 %    EOSINOPHILS 2 0 - 7 %    BASOPHILS 1 0 - 1 %    IMMATURE GRANULOCYTES 1 (H) 0.0 - 0.5 %    ABS. NEUTROPHILS 4.1 1.8 - 8.0 K/UL    ABS. LYMPHOCYTES 1.2 0.8 - 3.5 K/UL    ABS. MONOCYTES 0.5 0.0 - 1.0 K/UL    ABS. EOSINOPHILS 0.1 0.0 - 0.4 K/UL    ABS. BASOPHILS 0.0 0.0 - 0.1 K/UL    ABS. IMM. GRANS. 0.0 0.00 - 0.04 K/UL    DF AUTOMATED     METABOLIC PANEL, COMPREHENSIVE    Collection Time: 01/15/22  3:08 AM   Result Value Ref Range    Sodium 142 136 - 145 mmol/L    Potassium 3.8 3.5 - 5.1 mmol/L    Chloride 108 97 - 108 mmol/L    CO2 29 21 - 32 mmol/L    Anion gap 5 5 - 15 mmol/L    Glucose 78 65 - 100 mg/dL    BUN 16 6 - 20 MG/DL    Creatinine 0.97 0.55 - 1.02 MG/DL    BUN/Creatinine ratio 16 12 - 20      GFR est AA >60 >60 ml/min/1.73m2    GFR est non-AA 54 (L) >60 ml/min/1.73m2    Calcium 9.3 8.5 - 10.1 MG/DL    Bilirubin, total 0.7 0.2 - 1.0 MG/DL    ALT (SGPT) 15 12 - 78 U/L    AST (SGOT) 19 15 - 37 U/L    Alk. phosphatase 56 45 - 117 U/L    Protein, total 6.3 (L) 6.4 - 8.2 g/dL    Albumin 2.8 (L) 3.5 - 5.0 g/dL    Globulin 3.5 2.0 - 4.0 g/dL    A-G Ratio 0.8 (L) 1.1 - 2.2     FIBRINOGEN    Collection Time: 01/15/22  3:08 AM   Result Value Ref Range    Fibrinogen 296 200 - 475 mg/dL   PROTHROMBIN TIME + INR    Collection Time: 01/15/22  3:08 AM   Result Value Ref Range    INR 1.1 0.9 - 1.1      Prothrombin time 11.4 (H) 9.0 - 11.1 sec   D DIMER    Collection Time: 01/15/22  3:08 AM   Result Value Ref Range    D-dimer 2.52 (H) 0.00 - 0.65 mg/L FEU       Telemetry: SR      Assessment:     Active Problems:    COVID-19 (1/14/2022)      Elevated troponin (1/14/2022)        Plan:     Troponin elevation due to COVID. F/u Echo.      Pursuant to the emergency declaration under the [address] and Response Supplemental Appropriations Act, this Virtual Visit was conducted, with patient's consent, to reduce the patient's risk of exposure to COVID-19 and provide continuity of care for an established patient. Services were provided through a video synchronous discussion virtually to substitute for in-person clinic visit.

## 2022-01-15 NOTE — PROGRESS NOTES
End of Shift Note    Bedside shift change report given to Marisol Gillespie (oncoming nurse) by Stella Greene RN (offgoing nurse). Report included the following information SBAR, Kardex, Intake/Output, MAR and Recent Results    Shift worked:  [id number]     Shift summary and any significant changes:     No significant changes. Pt's troponin is still elevated. Concerns for physician to address:  Echo      Zone phone for oncoming shift:   [phone]       Activity:  Activity Level: Up with Assistance  Number times ambulated in hallways past shift: 0  Number of times OOB to chair past shift: 1    Cardiac:   Cardiac Monitoring: Yes      Cardiac Rhythm: Sinus Rhythm    Access:   Current line(s): PIV     Genitourinary:   Urinary status: voiding    Respiratory:   O2 Device: None (Room air)  Chronic home O2 use?: NO  Incentive spirometer at bedside: NO     GI:  Last Bowel Movement Date: 01/13/22  Current diet:  ADULT DIET Regular; Low Fat/Low Chol/High Fiber/[place]ENILSON  Passing flatus: YES  Tolerating current diet: YES       Pain Management:   Patient states pain is manageable on current regimen: YES    Skin:  Maximo Score: 16  Interventions: increase time out of bed and PT/OT consult    Patient Safety:  Fall Score:  Total Score: 3  Interventions: assistive device (walker, cane, etc), gripper socks and pt to call before getting OOB  High Fall Risk: Yes    Length of Stay:  Expected LOS: - - -  Actual LOS: 0      Stella Greene, CATALINO

## 2022-01-15 NOTE — PROGRESS NOTES
Hospitalist Progress Note    NAME: Jesus Burn   :  1932   MRN:  [id number]       Assessment / Plan:    COVID-19 positive  Bilateral pneumonia  Nausea  We will admit to telemetry, as needed Zofran, IV fluid. Procalcitonin <0.05, so discontinue the antibiotics. Patient on room air so no need for the dexamethasone. Follow-up inflammatory marker  CT abdomen pelvis showed  . Bilateral lower lobe pneumonia more likely than atelectasis. Consider  aspiration given the moderate hiatal hernia. Recommend chest views. 2. 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 Small bowel enteritis is  infectious, inflammatory, or early ischemic. No pneumatosis or pneumoperitoneum  at this time. 3. 2.1 cm left breast nonspecific mass. Consider dedicated diagnostic breast  imaging as an outpatient if results would change clinical management. 4. Constipation pattern on CT. No bowel obstruction. 5. 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     Elevated troponin  No chest pain, troponin elevated. Status post Lovenox 0.5 mg by the ED  Cardiology consulted-recommended medical management, no intervention, has per cardiology not a NSTEMI. Echo ordered.     Hyponatremia, mild  Likely due to use of hydrochlorothiazide     Hypertension  Systolic blood pressure 429, will restart BP meds except hydrochlorothiazide  As needed labetalol    18.5 - 24.9 Normal weight / Body mass index is 24.66 kg/m². Estimated discharge date:   Barriers: Echo    Code status: DNR  Prophylaxis: Lovenox  Recommended Disposition: Home w/Family     Subjective:     Chief Complaint / Reason for Physician Visit  Patient seen and examined today, doing fine, no shortness of breath, no nausea and vomiting. Patient tolerating diet well. .  Discussed with RN events overnight.      Review of Systems:  Symptom Y/N Comments  Symptom Y/N Comments   Fever/Chills    Chest Pain     Poor Appetite Edema     Cough    Abdominal Pain     Sputum    Joint Pain     SOB/MAY    Pruritis/Rash     Nausea/vomit    Tolerating PT/OT     Diarrhea    Tolerating Diet     Constipation    Other       Could NOT obtain due to:      Objective:     VITALS:   Last 24hrs VS reviewed since prior progress note. Most recent are:  Patient Vitals for the past 24 hrs:   Temp Pulse Resp BP SpO2   01/15/22 0813 -- -- -- (!) 180/83 --   01/15/22 0811 97.5 °F (36.4 °C) 71 -- (!) 186/82 100 %   01/15/22 0429 98.1 °F (36.7 °C) 72 17 (!) 148/78 98 %   01/15/22 0037 98.4 °F (36.9 °C) 69 16 (!) 151/73 96 %   01/14/22 1953 98.2 °F (36.8 °C) 75 16 (!) 151/72 97 %   01/14/22 1823 -- -- -- (!) 186/85 --   01/14/22 1820 98.2 °F (36.8 °C) 75 16 (!) 188/102 97 %   01/14/22 1300 -- -- -- (!) 161/62 --   01/14/22 1229 -- 74 -- (!) 168/66 --       Intake/Output Summary (Last 24 hours) at 1/15/2022 1121  Last data filed at 1/15/2022 0315  Gross per 24 hour   Intake --   Output 150 ml   Net -150 ml        I had a face to face encounter and independently examined this patient on 1/15/2022, as outlined below:  PHYSICAL EXAM:  General: WD, WN. Alert, cooperative, no acute distress    EENT:  EOMI. Anicteric sclerae. MMM  Resp:  CTA bilaterally, no wheezing or rales. No accessory muscle use  CV:  Regular  rhythm,  No edema  GI:  Soft, Non distended, Non tender. +Bowel sounds  Neurologic:  Alert and oriented X 3, normal speech,   Psych:   Good insight. Not anxious nor agitated  Skin:  No rashes.   No jaundice    Reviewed most current lab test results and cultures  YES  Reviewed most current radiology test results   YES  Review and summation of old records today    NO  Reviewed patient's current orders and MAR    YES  PMH/SH reviewed - no change compared to H&P  ________________________________________________________________________  Care Plan discussed with:    Comments   Patient x    Family      RN x    Care Manager     Consultant Multidiciplinary team rounds were held today with , nursing, pharmacist and clinical coordinator. Patient's plan of care was discussed; medications were reviewed and discharge planning was addressed. ________________________________________________________________________  Total NON critical care TIME:  34   Minutes    Total CRITICAL CARE TIME Spent:   Minutes non procedure based      Comments   >50% of visit spent in counseling and coordination of care     ________________________________________________________________________  Mery Deng MD     Procedures: see electronic medical records for all procedures/Xrays and details which were not copied into this note but were reviewed prior to creation of Plan. LABS:  I reviewed today's most current labs and imaging studies.   Pertinent labs include:  Recent Labs     01/15/22  0308 01/14/22  0725   WBC 5.9 10.4   HGB 11.0* 12.3   HCT 33.9* 36.8    313     Recent Labs     01/15/22  0308 01/14/22  0725    135*   K 3.8 4.0    99   CO2 29 28   GLU 78 120*   BUN 16 21*   CREA 0.97 0.80   CA 9.3 10.7*   ALB 2.8* 3.1*   TBILI 0.7 0.5   ALT 15 15   INR 1.1  --        Signed: Mery Deng MD

## 2022-01-15 NOTE — ROUTINE PROCESS
Bedside shift change report given to cy Quick (oncoming nurse) by Gregory GREENFIELD RN (offgoing nurse). Report included the following information SBAR, Kardex and MAR.

## 2022-01-15 NOTE — PROGRESS NOTES
PT note:      Orders received and acknowledged.  Chart reviewed and will follow up tomorrow for PT evaluation.      Fela Quiles, PT, DPT

## 2022-01-15 NOTE — PROGRESS NOTES
Occupational Therapy:    Orders received and acknowledged. Chart reviewed and will follow up tomorrow for OT evaluation.      [facility], OTR/L

## 2022-01-16 LAB
ECHO AO ROOT DIAM: 2.8 CM
ECHO AO ROOT INDEX: 2.03 CM/M2
ECHO AR MAX VEL PISA: 3 M/S
ECHO AV AREA PEAK VELOCITY: 1.2 CM2
ECHO AV AREA PEAK VELOCITY: 1.2 CM2
ECHO AV MEAN GRADIENT: 4 MMHG
ECHO AV MEAN VELOCITY: 0.9 M/S
ECHO AV PEAK GRADIENT: 7 MMHG
ECHO AV PEAK VELOCITY: 1.4 M/S
ECHO AV REGURGITANT PHT: 768.3 MILLISECOND
ECHO AV VELOCITY RATIO: 0.57
ECHO AV VTI: 25.4 CM
ECHO LA DIAMETER INDEX: 2.54 CM/M2
ECHO LA DIAMETER: 3.5 CM
ECHO LA TO AORTIC ROOT RATIO: 1.25
ECHO LA VOL 4C: 28 ML (ref 22–52)
ECHO LA VOLUME INDEX A4C: 20 ML/M2 (ref 16–34)
ECHO LV E' LATERAL VELOCITY: 5 CM/S
ECHO LV E' SEPTAL VELOCITY: 3 CM/S
ECHO LV FRACTIONAL SHORTENING: 28 % (ref 28–44)
ECHO LV INTERNAL DIMENSION DIASTOLE INDEX: 2.83 CM/M2
ECHO LV INTERNAL DIMENSION DIASTOLIC: 3.9 CM (ref 3.9–5.3)
ECHO LV INTERNAL DIMENSION SYSTOLIC INDEX: 2.03 CM/M2
ECHO LV INTERNAL DIMENSION SYSTOLIC: 2.8 CM
ECHO LV IVSD: 0.8 CM (ref 0.6–0.9)
ECHO LV MASS 2D: 89.7 G (ref 67–162)
ECHO LV MASS INDEX 2D: 65 G/M2 (ref 43–95)
ECHO LV POSTERIOR WALL DIASTOLIC: 0.8 CM (ref 0.6–0.9)
ECHO LV RELATIVE WALL THICKNESS RATIO: 0.41
ECHO LVOT AREA: 2 CM2
ECHO LVOT DIAM: 1.6 CM
ECHO LVOT PEAK GRADIENT: 3 MMHG
ECHO LVOT PEAK VELOCITY: 0.8 M/S
ECHO MV A VELOCITY: 1.09 M/S
ECHO MV E DECELERATION TIME (DT): 195.7 MS
ECHO MV E VELOCITY: 0.6 M/S
ECHO MV E/A RATIO: 0.55
ECHO MV E/E' LATERAL: 12
ECHO MV E/E' RATIO (AVERAGED): 16
ECHO MV E/E' SEPTAL: 20
ECHO MV MAX VELOCITY: 1.2 M/S
ECHO MV MEAN GRADIENT: 2 MMHG
ECHO MV MEAN VELOCITY: 0.7 M/S
ECHO MV PEAK GRADIENT: 5 MMHG
ECHO MV REGURGITANT PEAK GRADIENT: 104 MMHG
ECHO MV REGURGITANT PEAK VELOCITY: 5.1 M/S
ECHO MV VTI: 24.5 CM
ECHO PV MAX VELOCITY: 1 M/S
ECHO PV PEAK GRADIENT: 4 MMHG

## 2022-01-16 PROCEDURE — 93306 TTE W/DOPPLER COMPLETE: CPT | Performed by: INTERNAL MEDICINE

## 2022-01-17 ENCOUNTER — PATIENT OUTREACH (OUTPATIENT)
Dept: CASE MANAGEMENT | Age: 87
End: 2022-01-17

## 2022-01-17 NOTE — PROGRESS NOTES
Transitions of Care Call  Call within 2 business days of discharge: Yes     Patient: Randell Zamora Patient : 1932 MRN: [id number] Confirmed ID [id number]    Last Discharge [address]       Complaint Diagnosis Description Type Department Provider    22 Constipation COVID-19 . .. ED to Hosp-Admission (Discharged) (ADMIT) Saran Simpson MD; Jimena Jimenez. .. Was this an external facility discharge? No     Challenges to be reviewed by the provider   Additional needs identified to be addressed with provider        Encounter was not routed to provider for escalation. Method of communication with provider: chart routing. Discussed COVID-19 related testing which was available at this time. Test results were positive. Patient informed of results, if available? yes. Current Symptoms:diarrhea, no new symptoms and no worsening symptoms    Reviewed New or Changed Meds: yes. Pt has not picked up prescription for Vit C    Do you have what you need at home?  Durable Medical Equipment ordered at discharge: None   Home Health/Outpatient orders at discharge: none    Pulse oximeter? no    Patient education provided: Reviewed appropriate site of care based on symptoms and resources available to patient including: PCP. Follow up appointment scheduled within 7 days of discharge: no. If no appointment scheduled, scheduling offered: yes., Pt declined  No future appointments. Interventions: Obtained and reviewed discharge summary and/or continuity of care documents  CTN reviewed discharge instructions, medical action plan and red flags with patient who verbalized understanding. Provided contact information for future needs. Plan for follow-up call in 5-7 days based on severity of symptoms and risk factors.   Plan for next call: symptom management-diarrhea, self management-No falls and medication management-Vit C

## 2022-01-19 ENCOUNTER — TELEPHONE (OUTPATIENT)
Dept: CARDIOLOGY CLINIC | Age: 87
End: 2022-01-19

## 2022-01-19 NOTE — TELEPHONE ENCOUNTER
Called and spoke to patient. Verified with two identifiers. Informed of the following message:  ----- Message from Nasrin Jaimes MD sent at 1/19/2022 10:20 AM EST -----  Inform her Echo is k.      Patient verbalized understanding at this time

## 2022-01-24 ENCOUNTER — PATIENT OUTREACH (OUTPATIENT)
Dept: CASE MANAGEMENT | Age: 87
End: 2022-01-24

## 2022-01-31 ENCOUNTER — PATIENT OUTREACH (OUTPATIENT)
Dept: CASE MANAGEMENT | Age: 87
End: 2022-01-31

## 2022-03-19 PROBLEM — R77.8 ELEVATED TROPONIN: Status: ACTIVE | Noted: 2022-01-14

## 2022-03-19 PROBLEM — R79.89 ELEVATED TROPONIN: Status: ACTIVE | Noted: 2022-01-14

## 2022-03-19 PROBLEM — U07.1 COVID-19: Status: ACTIVE | Noted: 2022-01-14

## 2022-07-25 ENCOUNTER — HOSPITAL ENCOUNTER (OUTPATIENT)
Age: 87
Setting detail: OBSERVATION
Discharge: HOME HEALTH | End: 2022-07-27
Attending: STUDENT IN AN ORGANIZED HEALTH CARE EDUCATION/TRAINING PROGRAM | Admitting: INTERNAL MEDICINE
Payer: MEDICARE

## 2022-07-25 ENCOUNTER — APPOINTMENT (OUTPATIENT)
Dept: GENERAL RADIOLOGY | Age: 87
End: 2022-07-25
Attending: STUDENT IN AN ORGANIZED HEALTH CARE EDUCATION/TRAINING PROGRAM
Payer: MEDICARE

## 2022-07-25 ENCOUNTER — APPOINTMENT (OUTPATIENT)
Dept: CT IMAGING | Age: 87
End: 2022-07-25
Attending: STUDENT IN AN ORGANIZED HEALTH CARE EDUCATION/TRAINING PROGRAM
Payer: MEDICARE

## 2022-07-25 DIAGNOSIS — Z86.73 HISTORY OF CVA (CEREBROVASCULAR ACCIDENT): ICD-10-CM

## 2022-07-25 DIAGNOSIS — R41.82 ALTERED MENTAL STATUS, UNSPECIFIED ALTERED MENTAL STATUS TYPE: Primary | ICD-10-CM

## 2022-07-25 DIAGNOSIS — R56.9 NEW ONSET SEIZURE (HCC): ICD-10-CM

## 2022-07-25 LAB
ALBUMIN SERPL-MCNC: 3.7 G/DL (ref 3.5–5)
ALBUMIN/GLOB SERPL: 1 {RATIO} (ref 1.1–2.2)
ALP SERPL-CCNC: 70 U/L (ref 45–117)
ALT SERPL-CCNC: 14 U/L (ref 12–78)
AMPHET UR QL SCN: NEGATIVE
ANION GAP SERPL CALC-SCNC: 15 MMOL/L (ref 5–15)
APPEARANCE UR: ABNORMAL
AST SERPL-CCNC: 19 U/L (ref 15–37)
BACTERIA URNS QL MICRO: NEGATIVE /HPF
BARBITURATES UR QL SCN: NEGATIVE
BASE DEFICIT BLD-SCNC: 5.4 MMOL/L
BASE EXCESS BLD CALC-SCNC: 4.6 MMOL/L
BASOPHILS # BLD: 0.1 K/UL (ref 0–0.1)
BASOPHILS NFR BLD: 1 % (ref 0–1)
BENZODIAZ UR QL: NEGATIVE
BILIRUB SERPL-MCNC: 0.4 MG/DL (ref 0.2–1)
BILIRUB UR QL: NEGATIVE
BUN SERPL-MCNC: 19 MG/DL (ref 6–20)
BUN/CREAT SERPL: 13 (ref 12–20)
CA-I BLD-MCNC: 1.33 MMOL/L (ref 1.12–1.32)
CALCIUM SERPL-MCNC: 10.6 MG/DL (ref 8.5–10.1)
CANNABINOIDS UR QL SCN: NEGATIVE
CHLORIDE BLD-SCNC: 102 MMOL/L (ref 100–108)
CHLORIDE SERPL-SCNC: 101 MMOL/L (ref 97–108)
CO2 BLD-SCNC: 26 MMOL/L (ref 19–24)
CO2 SERPL-SCNC: 23 MMOL/L (ref 21–32)
COCAINE UR QL SCN: NEGATIVE
COLOR UR: ABNORMAL
COMMENT, HOLDF: NORMAL
CREAT SERPL-MCNC: 1.5 MG/DL (ref 0.55–1.02)
CREAT UR-MCNC: 1.5 MG/DL (ref 0.6–1.3)
DIFFERENTIAL METHOD BLD: NORMAL
DRUG SCRN COMMENT,DRGCM: NORMAL
EOSINOPHIL # BLD: 0.1 K/UL (ref 0–0.4)
EOSINOPHIL NFR BLD: 1 % (ref 0–7)
EPITH CASTS URNS QL MICRO: ABNORMAL /LPF
ERYTHROCYTE [DISTWIDTH] IN BLOOD BY AUTOMATED COUNT: 13.8 % (ref 11.5–14.5)
GLOBULIN SER CALC-MCNC: 3.8 G/DL (ref 2–4)
GLUCOSE BLD STRIP.AUTO-MCNC: 122 MG/DL (ref 74–106)
GLUCOSE SERPL-MCNC: 124 MG/DL (ref 65–100)
GLUCOSE UR STRIP.AUTO-MCNC: NEGATIVE MG/DL
HCO3 BLD-SCNC: 31.2 MMOL/L (ref 22–26)
HCO3 BLDA-SCNC: 25 MMOL/L
HCT VFR BLD AUTO: 41.6 % (ref 35–47)
HGB BLD-MCNC: 13.1 G/DL (ref 11.5–16)
HGB UR QL STRIP: NEGATIVE
IMM GRANULOCYTES # BLD AUTO: 0 K/UL (ref 0–0.04)
IMM GRANULOCYTES NFR BLD AUTO: 0 % (ref 0–0.5)
KETONES UR QL STRIP.AUTO: ABNORMAL MG/DL
LACTATE BLD-SCNC: 1.06 MMOL/L (ref 0.4–2)
LACTATE BLD-SCNC: 11.37 MMOL/L (ref 0.4–2)
LEUKOCYTE ESTERASE UR QL STRIP.AUTO: NEGATIVE
LYMPHOCYTES # BLD: 2.4 K/UL (ref 0.8–3.5)
LYMPHOCYTES NFR BLD: 34 % (ref 12–49)
MCH RBC QN AUTO: 31 PG (ref 26–34)
MCHC RBC AUTO-ENTMCNC: 31.5 G/DL (ref 30–36.5)
MCV RBC AUTO: 98.6 FL (ref 80–99)
METHADONE UR QL: NEGATIVE
MONOCYTES # BLD: 0.8 K/UL (ref 0–1)
MONOCYTES NFR BLD: 12 % (ref 5–13)
NEUTS SEG # BLD: 3.7 K/UL (ref 1.8–8)
NEUTS SEG NFR BLD: 52 % (ref 32–75)
NITRITE UR QL STRIP.AUTO: NEGATIVE
NRBC # BLD: 0 K/UL (ref 0–0.01)
NRBC BLD-RTO: 0 PER 100 WBC
OPIATES UR QL: NEGATIVE
PCO2 BLD: 54.8 MMHG (ref 35–45)
PCO2 BLDV: 67 MMHG (ref 41–51)
PCP UR QL: NEGATIVE
PH BLD: 7.36 [PH] (ref 7.35–7.45)
PH BLDV: 7.17 [PH] (ref 7.32–7.42)
PH UR STRIP: 7 [PH] (ref 5–8)
PLATELET # BLD AUTO: 236 K/UL (ref 150–400)
PMV BLD AUTO: 11.8 FL (ref 8.9–12.9)
PO2 BLD: 26 MMHG (ref 80–100)
PO2 BLDV: 50 MMHG (ref 25–40)
POTASSIUM BLD-SCNC: 3.2 MMOL/L (ref 3.5–5.5)
POTASSIUM SERPL-SCNC: 3.3 MMOL/L (ref 3.5–5.1)
PROT SERPL-MCNC: 7.5 G/DL (ref 6.4–8.2)
PROT UR STRIP-MCNC: 30 MG/DL
RBC # BLD AUTO: 4.22 M/UL (ref 3.8–5.2)
RBC #/AREA URNS HPF: ABNORMAL /HPF (ref 0–5)
SAMPLES BEING HELD,HOLD: NORMAL
SAO2 % BLD: 42.9 % (ref 92–97)
SERVICE CMNT-IMP: ABNORMAL
SERVICE CMNT-IMP: ABNORMAL
SODIUM BLD-SCNC: 143 MMOL/L (ref 136–145)
SODIUM SERPL-SCNC: 139 MMOL/L (ref 136–145)
SP GR UR REFRACTOMETRY: 1.02
SPECIMEN SITE: ABNORMAL
SPECIMEN TYPE: ABNORMAL
TROPONIN-HIGH SENSITIVITY: 27 NG/L (ref 0–51)
TSH SERPL DL<=0.05 MIU/L-ACNC: 3.27 UIU/ML (ref 0.36–3.74)
UA: UC IF INDICATED,UAUC: ABNORMAL
UROBILINOGEN UR QL STRIP.AUTO: 1 EU/DL (ref 0.2–1)
WBC # BLD AUTO: 7.2 K/UL (ref 3.6–11)
WBC URNS QL MICRO: ABNORMAL /HPF (ref 0–4)

## 2022-07-25 PROCEDURE — 96376 TX/PRO/DX INJ SAME DRUG ADON: CPT

## 2022-07-25 PROCEDURE — 74011250636 HC RX REV CODE- 250/636: Performed by: STUDENT IN AN ORGANIZED HEALTH CARE EDUCATION/TRAINING PROGRAM

## 2022-07-25 PROCEDURE — 80307 DRUG TEST PRSMV CHEM ANLYZR: CPT

## 2022-07-25 PROCEDURE — 83605 ASSAY OF LACTIC ACID: CPT

## 2022-07-25 PROCEDURE — 96361 HYDRATE IV INFUSION ADD-ON: CPT

## 2022-07-25 PROCEDURE — 80053 COMPREHEN METABOLIC PANEL: CPT

## 2022-07-25 PROCEDURE — 99285 EMERGENCY DEPT VISIT HI MDM: CPT

## 2022-07-25 PROCEDURE — 84443 ASSAY THYROID STIM HORMONE: CPT

## 2022-07-25 PROCEDURE — 85025 COMPLETE CBC W/AUTO DIFF WBC: CPT

## 2022-07-25 PROCEDURE — 70450 CT HEAD/BRAIN W/O DYE: CPT

## 2022-07-25 PROCEDURE — 71045 X-RAY EXAM CHEST 1 VIEW: CPT

## 2022-07-25 PROCEDURE — 96374 THER/PROPH/DIAG INJ IV PUSH: CPT

## 2022-07-25 PROCEDURE — 81001 URINALYSIS AUTO W/SCOPE: CPT

## 2022-07-25 PROCEDURE — 84484 ASSAY OF TROPONIN QUANT: CPT

## 2022-07-25 PROCEDURE — 74011250636 HC RX REV CODE- 250/636

## 2022-07-25 PROCEDURE — 36415 COLL VENOUS BLD VENIPUNCTURE: CPT

## 2022-07-25 PROCEDURE — 96375 TX/PRO/DX INJ NEW DRUG ADDON: CPT

## 2022-07-25 PROCEDURE — 82947 ASSAY GLUCOSE BLOOD QUANT: CPT

## 2022-07-25 PROCEDURE — 87040 BLOOD CULTURE FOR BACTERIA: CPT

## 2022-07-25 PROCEDURE — 93005 ELECTROCARDIOGRAM TRACING: CPT

## 2022-07-25 RX ORDER — ONDANSETRON 2 MG/ML
4 INJECTION INTRAMUSCULAR; INTRAVENOUS
Status: DISCONTINUED | OUTPATIENT
Start: 2022-07-25 | End: 2022-07-26

## 2022-07-25 RX ORDER — ACETAMINOPHEN 325 MG/1
650 TABLET ORAL
Status: DISCONTINUED | OUTPATIENT
Start: 2022-07-25 | End: 2022-07-26

## 2022-07-25 RX ORDER — LORAZEPAM 2 MG/ML
INJECTION INTRAMUSCULAR
Status: COMPLETED
Start: 2022-07-25 | End: 2022-07-25

## 2022-07-25 RX ADMIN — LORAZEPAM 1 MG: 2 INJECTION INTRAMUSCULAR; INTRAVENOUS at 19:54

## 2022-07-25 RX ADMIN — SODIUM CHLORIDE 1000 ML: 9 INJECTION, SOLUTION INTRAVENOUS at 22:17

## 2022-07-25 NOTE — ED TRIAGE NOTES
Patient brought in via EMS from home with complaints of altered mental status. EMS reports that patient's family called due to progressively worsening mental status beginning around 2pm today. EMS reports that patient is A&O Xs 4 at baseline, upon arrival patient is unable to to answer questions. EMS states that patient is supposed to take losartan for blood pressure but that patient denied taking it today.

## 2022-07-25 NOTE — ED PROVIDER NOTES
EMERGENCY DEPARTMENT HISTORY AND PHYSICAL EXAM      Date: 7/25/2022  Patient Name: Crystal Hendrix    History of Presenting Illness     Chief Complaint   Patient presents with    Altered mental status     Per patient's family patient has become progressively more altered since 2pm today     History Provided By: Son    HPI: Crystal Hendrix, 80 y.o. female with past medical history of hypertension, hyperlipidemia, chronic back pain, sciatica, presents to the ED with cc of altered mental status. Per son, they took the patient out for lunch around 1 pm today (he last saw the patient normal 1 week prior). States that she seemed to be largely in her usual state of health during lunch, except for some intermittent mild confusion. States that patient ate her lunch well and seemed to have a good appetite. Mental status progressively deteriorated from that point onwards. States that she was speaking nonsensically but not slurring her speech or aphasic. Patient was apparently hallucinating and talking to people who weren't there at one point. She also told her daughter that her son never came to visit her when he had just left her house. She did not have any other focal neurologic changes. She is not on any blood thinners. No recent head trauma. Son shares that patient's only daily medication is antihypertensive medication. However, states patient has not been taking any of her antihypertensives recently due to lower than usual blood pressures. Son reports patient has had histories of TIAs/possible strokes in the past without residual deficits. Patient was reportedly answering questions appropriately for EMS and speaking to them en route. However, almost immediately on arrival to the ED, patient now no longer answering questions appropriately, and subsequently observed to go into a generalized tonic clonic seizure. Patient has no known history of seizure disorder.   Son reports patient has recently been using otc \"hemp oil\" to help with her chronic sciatica pain. Patient is normally A&Ox3, lives by herself and fully independent with her ADL's. Confirmed with son verbally that patient is DNR. PCP: Libby Farah MD    No current facility-administered medications on file prior to encounter. Current Outpatient Medications on File Prior to Encounter   Medication Sig Dispense Refill    traMADol-acetaminophen (ULTRACET) 37.5-325 mg per tablet Take 1 Tab by mouth every eight (8) hours as needed for Pain. Max Daily Amount: 3 Tabs. 30 Tab 0    losartan (COZAAR) 50 mg tablet Take 1 Tab by mouth daily. For blood pressure 90 Tab 2    cyclobenzaprine (FLEXERIL) 5 mg tablet Take 1 Tab by mouth nightly. Indications: MUSCLE SPASM 20 Tab 1    pravastatin (PRAVACHOL) 20 mg tablet Take 1 tablet by mouth nightly. For cholesterol 90 tablet 3    aspirin delayed-release 81 mg tablet Take 1 Tab by mouth daily. Past History     Past Medical History:  Past Medical History:   Diagnosis Date    Chronic pain     mid back and left side    Hypercholesterolemia 11/7/2011    Hypertension 11/6/2014       Past Surgical History:  Past Surgical History:   Procedure Laterality Date    HX OTHER SURGICAL      mole removal on right arm       Family History:  Family History   Problem Relation Age of Onset    Cancer Mother         colon    Heart Attack Father     Kidney Disease Sister         on dialysis       Social History:  Social History     Tobacco Use    Smoking status: Never    Smokeless tobacco: Never   Substance Use Topics    Alcohol use: No    Drug use: No       Allergies: Allergies   Allergen Reactions    Lisinopril Cough         Review of Systems   Review of Systems   Unable to perform ROS: Mental status change     Physical Exam   Physical Exam  Vitals and nursing note reviewed. Constitutional:       General: She is in acute distress. Appearance: She is ill-appearing. She is not toxic-appearing.    HENT:      Head: Normocephalic and atraumatic. Nose: Nose normal.      Mouth/Throat:      Mouth: Mucous membranes are moist.   Eyes:      Pupils: Pupils are equal, round, and reactive to light. Comments: Forced R browning gaze deviation   Cardiovascular:      Rate and Rhythm: Regular rhythm. Tachycardia present. Pulses: Normal pulses. Pulmonary:      Effort: Pulmonary effort is normal. No respiratory distress. Breath sounds: No stridor. No wheezing or rhonchi. Abdominal:      General: Abdomen is flat. There is no distension. Palpations: Abdomen is soft. Musculoskeletal:         General: Normal range of motion. Cervical back: Neck supple. Skin:     General: Skin is warm and dry. Neurological:      General: No focal deficit present. Mental Status: She is unresponsive.       Comments: Actively seizing       Diagnostic Study Results     Labs -     Recent Results (from the past 24 hour(s))   BLOOD GAS,CHEM8,LACTIC ACID POC    Collection Time: 07/25/22  8:03 PM   Result Value Ref Range    Calcium, ionized (POC) 1.33 (H) 1.12 - 1.32 mmol/L    BICARBONATE 25 mmol/L    Base deficit (POC) 5.4 mmol/L    Sample source VENOUS BLOOD      CO2, POC 26 (H) 19 - 24 MMOL/L    Sodium,  136 - 145 MMOL/L    Potassium, POC 3.2 (L) 3.5 - 5.5 MMOL/L    Chloride,  100 - 108 MMOL/L    Glucose,  (H) 74 - 106 MG/DL    Creatinine, POC 1.5 (H) 0.6 - 1.3 MG/DL    Lactic Acid (POC) 11.37 (HH) 0.40 - 2.00 mmol/L    Critical value read back ALEJANDRO     pH, venous (POC) 7.17 (LL) 7.32 - 7.42      pCO2, venous (POC) 67.0 (H) 41 - 51 MMHG    pO2, venous (POC) 50 (H) 25 - 40 mmHg   CBC WITH AUTOMATED DIFF    Collection Time: 07/25/22  8:09 PM   Result Value Ref Range    WBC 7.2 3.6 - 11.0 K/uL    RBC 4.22 3.80 - 5.20 M/uL    HGB 13.1 11.5 - 16.0 g/dL    HCT 41.6 35.0 - 47.0 %    MCV 98.6 80.0 - 99.0 FL    MCH 31.0 26.0 - 34.0 PG    MCHC 31.5 30.0 - 36.5 g/dL    RDW 13.8 11.5 - 14.5 %    PLATELET 398 305 - 410 K/uL    MPV 11.8 8.9 - 12.9 FL    NRBC 0.0 0  WBC    ABSOLUTE NRBC 0.00 0.00 - 0.01 K/uL    NEUTROPHILS 52 32 - 75 %    LYMPHOCYTES 34 12 - 49 %    MONOCYTES 12 5 - 13 %    EOSINOPHILS 1 0 - 7 %    BASOPHILS 1 0 - 1 %    IMMATURE GRANULOCYTES 0 0.0 - 0.5 %    ABS. NEUTROPHILS 3.7 1.8 - 8.0 K/UL    ABS. LYMPHOCYTES 2.4 0.8 - 3.5 K/UL    ABS. MONOCYTES 0.8 0.0 - 1.0 K/UL    ABS. EOSINOPHILS 0.1 0.0 - 0.4 K/UL    ABS. BASOPHILS 0.1 0.0 - 0.1 K/UL    ABS. IMM. GRANS. 0.0 0.00 - 0.04 K/UL    DF AUTOMATED     METABOLIC PANEL, COMPREHENSIVE    Collection Time: 07/25/22  8:09 PM   Result Value Ref Range    Sodium 139 136 - 145 mmol/L    Potassium 3.3 (L) 3.5 - 5.1 mmol/L    Chloride 101 97 - 108 mmol/L    CO2 23 21 - 32 mmol/L    Anion gap 15 5 - 15 mmol/L    Glucose 124 (H) 65 - 100 mg/dL    BUN 19 6 - 20 MG/DL    Creatinine 1.50 (H) 0.55 - 1.02 MG/DL    BUN/Creatinine ratio 13 12 - 20      GFR est AA 40 (L) >60 ml/min/1.73m2    GFR est non-AA 33 (L) >60 ml/min/1.73m2    Calcium 10.6 (H) 8.5 - 10.1 MG/DL    Bilirubin, total 0.4 0.2 - 1.0 MG/DL    ALT (SGPT) 14 12 - 78 U/L    AST (SGOT) 19 15 - 37 U/L    Alk. phosphatase 70 45 - 117 U/L    Protein, total 7.5 6.4 - 8.2 g/dL    Albumin 3.7 3.5 - 5.0 g/dL    Globulin 3.8 2.0 - 4.0 g/dL    A-G Ratio 1.0 (L) 1.1 - 2.2     TSH 3RD GENERATION    Collection Time: 07/25/22  8:09 PM   Result Value Ref Range    TSH 3.27 0.36 - 3.74 uIU/mL   TROPONIN-HIGH SENSITIVITY    Collection Time: 07/25/22  8:09 PM   Result Value Ref Range    Troponin-High Sensitivity 27 0 - 51 ng/L   SAMPLES BEING HELD    Collection Time: 07/25/22  8:09 PM   Result Value Ref Range    SAMPLES BEING HELD BLUE     COMMENT        Add-on orders for these samples will be processed based on acceptable specimen integrity and analyte stability, which may vary by analyte.    EKG, 12 LEAD, INITIAL    Collection Time: 07/25/22  8:27 PM   Result Value Ref Range    Ventricular Rate 90 BPM    Atrial Rate 90 BPM    P-R Interval 164 ms    QRS Duration 90 ms    Q-T Interval 386 ms    QTC Calculation (Bezet) 472 ms    Calculated P Axis 41 degrees    Calculated R Axis -23 degrees    Calculated T Axis 102 degrees    Diagnosis       Sinus rhythm with frequent premature ventricular complexes  Possible Left atrial enlargement  Left ventricular hypertrophy with repolarization abnormality  When compared with ECG of 14-JAN-2022 07:40,  premature ventricular complexes are now present  T wave amplitude has increased in Inferior leads  T wave inversion less evident in Lateral leads     URINALYSIS W/ REFLEX CULTURE    Collection Time: 07/25/22  9:26 PM    Specimen: Urine   Result Value Ref Range    Color YELLOW/STRAW      Appearance CLOUDY (A) CLEAR      Specific gravity 1.018      pH (UA) 7.0 5.0 - 8.0      Protein 30 (A) NEG mg/dL    Glucose Negative NEG mg/dL    Ketone TRACE (A) NEG mg/dL    Bilirubin Negative NEG      Blood Negative NEG      Urobilinogen 1.0 0.2 - 1.0 EU/dL    Nitrites Negative NEG      Leukocyte Esterase Negative NEG      WBC 0-4 0 - 4 /hpf    RBC 0-5 0 - 5 /hpf    Epithelial cells MODERATE (A) FEW /lpf    Bacteria Negative NEG /hpf    UA:UC IF INDICATED CULTURE NOT INDICATED BY UA RESULT CNI     DRUG SCREEN, URINE    Collection Time: 07/25/22  9:26 PM   Result Value Ref Range    AMPHETAMINES Negative NEG      BARBITURATES Negative NEG      BENZODIAZEPINES Negative NEG      COCAINE Negative NEG      METHADONE Negative NEG      OPIATES Negative NEG      PCP(PHENCYCLIDINE) Negative NEG      THC (TH-CANNABINOL) Negative NEG      Drug screen comment (NOTE)        Radiologic Studies -   XR CHEST PORT   Final Result      No overt CHF. Possible right suprahilar nodule. Right apex obscured. Recommend two-view   radiography when the patient is more stable.       CT HEAD WO CONT   Final Result         Given motion no acute intracranial abnormality        CT Results  (Last 48 hours)                 07/25/22 2016  CT HEAD WO CONT Final result    Impression: Given motion no acute intracranial abnormality       Narrative:  EXAM: CT HEAD WO CONT       INDICATION: AMS, new onset seizure       COMPARISON: None. CONTRAST: None. TECHNIQUE: Unenhanced CT of the head was performed using 5 mm images. Brain and   bone windows were generated. Coronal and sagittal reformats. CT dose reduction   was achieved through use of a standardized protocol tailored for this   examination and automatic exposure control for dose modulation. Study limited by   motion artifact       FINDINGS:   Ventricles and cisterns are enlarged compatible with the overall degree of   volume loss. Mild low-density in the periventricular white matter. Chronic right   caudate head lacunar infarct. There is no intracranial hemorrhage, extra-axial   collection, or mass effect. The basilar cisterns are open. No CT evidence of   acute infarct. The bone windows demonstrate no abnormalities. The visualized portions of the   paranasal sinuses and mastoid air cells are clear. CXR Results  (Last 48 hours)                 07/25/22 2036  XR CHEST PORT Final result    Impression:      No overt CHF. Possible right suprahilar nodule. Right apex obscured. Recommend two-view   radiography when the patient is more stable. Narrative:  EXAM: XR CHEST PORT       INDICATION: Acute mental status change       COMPARISON: 1/14/2022       FINDINGS: A portable AP radiograph of the chest was obtained at 2032 hours. The   patient is on a cardiac monitor. The chin overlies the right apex. Possible   right suprahilar 1.5 cm nodule. . The cardiac and mediastinal contours and   pulmonary vascularity are stable. The bones and soft tissues are grossly within   normal limits. Medical Decision Making   Hudson SHERMAN MD am the first provider for this patient, and I am the attending of record for this patient encounter.     I reviewed the vital signs, available nursing notes, past medical history, past surgical history, family history and social history. Vital Signs-Reviewed the patient's vital signs. Patient Vitals for the past 24 hrs:   Temp Pulse Resp BP SpO2   07/25/22 2100 -- 63 15 (!) 138/57 100 %   07/25/22 2030 -- 94 24 (!) 172/81 100 %   07/25/22 1945 98.1 °F (36.7 °C) 94 26 (!) 200/107 97 %       EKG interpretation: (Preliminary)  Sinus rhythm with frequent PVCs. Nonspecific ST changes. EKG interpretation by Sveta Noel MD    Records Reviewed: Nursing Notes and Old Medical Records    Provider Notes (Medical Decision Making):   Ddx: ICH, acute CVA, intracranial malignancy, sepsis, tox, UTI, metabolic process, etc.    Patient witnessed to go into generalized tonic-clonic seizure upon arrival to the ED. Immediately placed on monitor, with systolic BP elevated to 625, tachycardic. She appears to be having some difficulty with breathing, and was placed onto nasal cannula with subsequent improvement in saturations. Patient noted to have rightward forced gaze deviation during the seizure event. She was given 1 mg of IV Ativan. Duration of seizure was no more than 1-2 minutes in total.  Patient postictal after. Initial point-of-care labs show elevated lactic acid of 11.37, likely secondary to acute seizure. pH of 7.17, PCO2 slightly elevated at 67-again likely secondary to acute seizure state. She was taken immediately after this to CT to undergo CT head imaging. CT is negative for intracranial hemorrhage. Chronic right caudate lacunar infarct without CT evidence of acute infarct-- possible foci of seizure event? Especially in light of forced R eye deviation. Remainder of blood work demonstrated ENMANUEL with creatinine 1.5. Will administer 1 L of IV fluids for this finding. UA is negative for infection. Urine tox is negative.   TSH normal.  Chest x-ray without obvious acute abnormality, though with incidental finding of possible right suprahilar nodule-limited by x-ray view obscured in the right apex, with recommendation to repeat when patient is more stable. Patient reevaluated several times during her ED course, continues to be post ictal, though does spontaneously move all extremities with tactile stimulation. Appears to be slowly waking up, though still somnolent. Considering advanced age, new onset seizures, and not currently back to her baseline mental status-I feel she would benefit from admission and further work-up. Discussed with hospitalist, who has agreed to evaluate and manage. ED Course:   Initial assessment performed. The patient's presenting problems have been discussed, and they are in agreement with the care plan formulated and outlined with them. I have encouraged them to ask questions as they arise throughout their visit. Sveta Noel MD      Disposition:  Admit      Diagnosis     Clinical Impression:   1. Altered mental status, unspecified altered mental status type    2. New onset seizure ([address]    3. History of CVA (cerebrovascular accident)        Attestations:    Sveta Noel MD    Please note that this dictation was completed with SETVI, the computer voice recognition software. Quite often unanticipated grammatical, syntax, homophones, and other interpretive errors are inadvertently transcribed by the computer software. Please disregard these errors. Please excuse any errors that have escaped final proofreading. Thank you.

## 2022-07-26 ENCOUNTER — APPOINTMENT (OUTPATIENT)
Dept: VASCULAR SURGERY | Age: 87
End: 2022-07-26
Attending: PSYCHIATRY & NEUROLOGY
Payer: MEDICARE

## 2022-07-26 PROBLEM — G40.209 COMPLEX PARTIAL SEIZURES EVOLVING TO GENERALIZED TONIC-CLONIC SEIZURES (HCC): Status: ACTIVE | Noted: 2022-07-26

## 2022-07-26 PROBLEM — I65.23 BILATERAL CAROTID ARTERY STENOSIS: Status: ACTIVE | Noted: 2022-07-26

## 2022-07-26 PROBLEM — I67.89 CEREBRAL MICROVASCULAR DISEASE: Status: ACTIVE | Noted: 2022-07-26

## 2022-07-26 PROBLEM — R41.82 ACUTE ALTERATION IN MENTAL STATUS: Status: ACTIVE | Noted: 2022-07-26

## 2022-07-26 PROBLEM — R55 CONVULSIVE SYNCOPE: Status: ACTIVE | Noted: 2022-07-26

## 2022-07-26 LAB
ANION GAP SERPL CALC-SCNC: 6 MMOL/L (ref 5–15)
ATRIAL RATE: 90 BPM
BUN SERPL-MCNC: 16 MG/DL (ref 6–20)
BUN/CREAT SERPL: 19 (ref 12–20)
CALCIUM SERPL-MCNC: 9.2 MG/DL (ref 8.5–10.1)
CALCULATED P AXIS, ECG09: 41 DEGREES
CALCULATED R AXIS, ECG10: -23 DEGREES
CALCULATED T AXIS, ECG11: 102 DEGREES
CHLORIDE SERPL-SCNC: 109 MMOL/L (ref 97–108)
CO2 SERPL-SCNC: 25 MMOL/L (ref 21–32)
CREAT SERPL-MCNC: 0.83 MG/DL (ref 0.55–1.02)
DIAGNOSIS, 93000: NORMAL
ERYTHROCYTE [DISTWIDTH] IN BLOOD BY AUTOMATED COUNT: 13.8 % (ref 11.5–14.5)
GLUCOSE SERPL-MCNC: 86 MG/DL (ref 65–100)
HCT VFR BLD AUTO: 35.9 % (ref 35–47)
HGB BLD-MCNC: 11.5 G/DL (ref 11.5–16)
LACTATE SERPL-SCNC: 0.8 MMOL/L (ref 0.4–2)
LEFT CCA DIST DIAS: 10.2 CM/S
LEFT CCA DIST SYS: 56.8 CM/S
LEFT CCA PROX DIAS: 14.1 CM/S
LEFT CCA PROX SYS: 76.2 CM/S
LEFT ECA DIAS: 0 CM/S
LEFT ECA SYS: 189.1 CM/S
LEFT ICA DIST DIAS: 16.7 CM/S
LEFT ICA DIST SYS: 77.5 CM/S
LEFT ICA MID DIAS: 19.3 CM/S
LEFT ICA MID SYS: 76.2 CM/S
LEFT ICA PROX DIAS: 19.3 CM/S
LEFT ICA PROX SYS: 81.4 CM/S
LEFT ICA/CCA SYS: 1.43 NO UNITS
LEFT VERTEBRAL DIAS: 9.78 CM/S
LEFT VERTEBRAL SYS: 59.2 CM/S
MCH RBC QN AUTO: 31.4 PG (ref 26–34)
MCHC RBC AUTO-ENTMCNC: 32 G/DL (ref 30–36.5)
MCV RBC AUTO: 98.1 FL (ref 80–99)
NRBC # BLD: 0 K/UL (ref 0–0.01)
NRBC BLD-RTO: 0 PER 100 WBC
P-R INTERVAL, ECG05: 164 MS
PLATELET # BLD AUTO: 92 K/UL (ref 150–400)
PMV BLD AUTO: 12 FL (ref 8.9–12.9)
POTASSIUM SERPL-SCNC: 4.1 MMOL/L (ref 3.5–5.1)
Q-T INTERVAL, ECG07: 386 MS
QRS DURATION, ECG06: 90 MS
QTC CALCULATION (BEZET), ECG08: 472 MS
RBC # BLD AUTO: 3.66 M/UL (ref 3.8–5.2)
RIGHT CCA DIST DIAS: 7.7 CM/S
RIGHT CCA DIST SYS: 52.1 CM/S
RIGHT CCA PROX DIAS: 6.4 CM/S
RIGHT CCA PROX SYS: 67.7 CM/S
RIGHT ECA DIAS: 0 CM/S
RIGHT ECA SYS: 101.6 CM/S
RIGHT ICA DIST DIAS: 12 CM/S
RIGHT ICA DIST SYS: 70.7 CM/S
RIGHT ICA MID DIAS: 16.8 CM/S
RIGHT ICA MID SYS: 66.4 CM/S
RIGHT ICA PROX DIAS: 14.2 CM/S
RIGHT ICA PROX SYS: 62.5 CM/S
RIGHT ICA/CCA SYS: 1.4 NO UNITS
RIGHT VERTEBRAL DIAS: 0 CM/S
RIGHT VERTEBRAL SYS: 48.3 CM/S
SODIUM SERPL-SCNC: 140 MMOL/L (ref 136–145)
VAS LEFT SUBCLAVIAN PROX EDV: 0 CM/S
VAS LEFT SUBCLAVIAN PROX PSV: 103.2 CM/S
VAS RIGHT SUBCLAVIAN PROX EDV: 0 CM/S
VAS RIGHT SUBCLAVIAN PROX PSV: 104.2 CM/S
VENTRICULAR RATE, ECG03: 90 BPM
WBC # BLD AUTO: 5.4 K/UL (ref 3.6–11)

## 2022-07-26 PROCEDURE — 97161 PT EVAL LOW COMPLEX 20 MIN: CPT

## 2022-07-26 PROCEDURE — 83520 IMMUNOASSAY QUANT NOS NONAB: CPT

## 2022-07-26 PROCEDURE — 93880 EXTRACRANIAL BILAT STUDY: CPT | Performed by: PSYCHIATRY & NEUROLOGY

## 2022-07-26 PROCEDURE — 80177 DRUG SCRN QUAN LEVETIRACETAM: CPT

## 2022-07-26 PROCEDURE — 97165 OT EVAL LOW COMPLEX 30 MIN: CPT

## 2022-07-26 PROCEDURE — 74011250636 HC RX REV CODE- 250/636: Performed by: INTERNAL MEDICINE

## 2022-07-26 PROCEDURE — 80048 BASIC METABOLIC PNL TOTAL CA: CPT

## 2022-07-26 PROCEDURE — 96372 THER/PROPH/DIAG INJ SC/IM: CPT

## 2022-07-26 PROCEDURE — 36415 COLL VENOUS BLD VENIPUNCTURE: CPT

## 2022-07-26 PROCEDURE — 97535 SELF CARE MNGMENT TRAINING: CPT

## 2022-07-26 PROCEDURE — 96366 THER/PROPH/DIAG IV INF ADDON: CPT

## 2022-07-26 PROCEDURE — 85027 COMPLETE CBC AUTOMATED: CPT

## 2022-07-26 PROCEDURE — 74011000250 HC RX REV CODE- 250: Performed by: INTERNAL MEDICINE

## 2022-07-26 PROCEDURE — G0378 HOSPITAL OBSERVATION PER HR: HCPCS

## 2022-07-26 PROCEDURE — 74011250637 HC RX REV CODE- 250/637: Performed by: INTERNAL MEDICINE

## 2022-07-26 PROCEDURE — 76937 US GUIDE VASCULAR ACCESS: CPT

## 2022-07-26 PROCEDURE — 99205 OFFICE O/P NEW HI 60 MIN: CPT | Performed by: PSYCHIATRY & NEUROLOGY

## 2022-07-26 PROCEDURE — 93880 EXTRACRANIAL BILAT STUDY: CPT

## 2022-07-26 PROCEDURE — 96365 THER/PROPH/DIAG IV INF INIT: CPT

## 2022-07-26 PROCEDURE — 95819 EEG AWAKE AND ASLEEP: CPT | Performed by: PSYCHIATRY & NEUROLOGY

## 2022-07-26 PROCEDURE — 97116 GAIT TRAINING THERAPY: CPT

## 2022-07-26 PROCEDURE — 74011000258 HC RX REV CODE- 258: Performed by: INTERNAL MEDICINE

## 2022-07-26 PROCEDURE — 83605 ASSAY OF LACTIC ACID: CPT

## 2022-07-26 PROCEDURE — 95816 EEG AWAKE AND DROWSY: CPT | Performed by: INTERNAL MEDICINE

## 2022-07-26 RX ORDER — POLYETHYLENE GLYCOL 3350 17 G/17G
17 POWDER, FOR SOLUTION ORAL DAILY PRN
Status: DISCONTINUED | OUTPATIENT
Start: 2022-07-26 | End: 2022-07-27 | Stop reason: HOSPADM

## 2022-07-26 RX ORDER — ACETAMINOPHEN 325 MG/1
650 TABLET ORAL
Status: DISCONTINUED | OUTPATIENT
Start: 2022-07-26 | End: 2022-07-27 | Stop reason: HOSPADM

## 2022-07-26 RX ORDER — HEPARIN SODIUM 5000 [USP'U]/ML
5000 INJECTION, SOLUTION INTRAVENOUS; SUBCUTANEOUS EVERY 12 HOURS
Status: DISCONTINUED | OUTPATIENT
Start: 2022-07-26 | End: 2022-07-27 | Stop reason: HOSPADM

## 2022-07-26 RX ORDER — POTASSIUM CHLORIDE 20 MEQ/1
40 TABLET, EXTENDED RELEASE ORAL 2 TIMES DAILY
Status: DISCONTINUED | OUTPATIENT
Start: 2022-07-26 | End: 2022-07-26

## 2022-07-26 RX ORDER — ONDANSETRON 4 MG/1
4 TABLET, ORALLY DISINTEGRATING ORAL
Status: DISCONTINUED | OUTPATIENT
Start: 2022-07-26 | End: 2022-07-27 | Stop reason: HOSPADM

## 2022-07-26 RX ORDER — SODIUM CHLORIDE 0.9 % (FLUSH) 0.9 %
5-40 SYRINGE (ML) INJECTION AS NEEDED
Status: DISCONTINUED | OUTPATIENT
Start: 2022-07-26 | End: 2022-07-27 | Stop reason: HOSPADM

## 2022-07-26 RX ORDER — LOSARTAN POTASSIUM 50 MG/1
50 TABLET ORAL DAILY
Status: DISCONTINUED | OUTPATIENT
Start: 2022-07-26 | End: 2022-07-27

## 2022-07-26 RX ORDER — ASPIRIN 81 MG/1
81 TABLET ORAL DAILY
Status: DISCONTINUED | OUTPATIENT
Start: 2022-07-26 | End: 2022-07-27 | Stop reason: HOSPADM

## 2022-07-26 RX ORDER — ENOXAPARIN SODIUM 100 MG/ML
40 INJECTION SUBCUTANEOUS DAILY
Status: DISCONTINUED | OUTPATIENT
Start: 2022-07-26 | End: 2022-07-26 | Stop reason: ALTCHOICE

## 2022-07-26 RX ORDER — SODIUM CHLORIDE 9 MG/ML
100 INJECTION, SOLUTION INTRAVENOUS CONTINUOUS
Status: DISPENSED | OUTPATIENT
Start: 2022-07-26 | End: 2022-07-27

## 2022-07-26 RX ORDER — ONDANSETRON 2 MG/ML
4 INJECTION INTRAMUSCULAR; INTRAVENOUS
Status: DISCONTINUED | OUTPATIENT
Start: 2022-07-26 | End: 2022-07-27 | Stop reason: HOSPADM

## 2022-07-26 RX ORDER — DIAZEPAM 10 MG/2ML
2 INJECTION INTRAMUSCULAR
Status: DISCONTINUED | OUTPATIENT
Start: 2022-07-26 | End: 2022-07-27 | Stop reason: HOSPADM

## 2022-07-26 RX ORDER — SODIUM CHLORIDE 0.9 % (FLUSH) 0.9 %
5-40 SYRINGE (ML) INJECTION EVERY 8 HOURS
Status: DISCONTINUED | OUTPATIENT
Start: 2022-07-26 | End: 2022-07-27 | Stop reason: HOSPADM

## 2022-07-26 RX ORDER — ACETAMINOPHEN 650 MG/1
650 SUPPOSITORY RECTAL
Status: DISCONTINUED | OUTPATIENT
Start: 2022-07-26 | End: 2022-07-27 | Stop reason: HOSPADM

## 2022-07-26 RX ORDER — PRAVASTATIN SODIUM 10 MG/1
20 TABLET ORAL
Status: DISCONTINUED | OUTPATIENT
Start: 2022-07-26 | End: 2022-07-27 | Stop reason: HOSPADM

## 2022-07-26 RX ORDER — POTASSIUM CHLORIDE 20 MEQ/1
40 TABLET, EXTENDED RELEASE ORAL ONCE
Status: COMPLETED | OUTPATIENT
Start: 2022-07-26 | End: 2022-07-26

## 2022-07-26 RX ADMIN — HEPARIN SODIUM 5000 UNITS: 5000 INJECTION INTRAVENOUS; SUBCUTANEOUS at 21:51

## 2022-07-26 RX ADMIN — LOSARTAN POTASSIUM 50 MG: 50 TABLET, FILM COATED ORAL at 09:01

## 2022-07-26 RX ADMIN — POTASSIUM CHLORIDE 40 MEQ: 20 TABLET, EXTENDED RELEASE ORAL at 02:55

## 2022-07-26 RX ADMIN — HEPARIN SODIUM 5000 UNITS: 5000 INJECTION INTRAVENOUS; SUBCUTANEOUS at 09:02

## 2022-07-26 RX ADMIN — SODIUM CHLORIDE, PRESERVATIVE FREE 10 ML: 5 INJECTION INTRAVENOUS at 02:35

## 2022-07-26 RX ADMIN — SODIUM CHLORIDE, PRESERVATIVE FREE 10 ML: 5 INJECTION INTRAVENOUS at 22:01

## 2022-07-26 RX ADMIN — LEVETIRACETAM 1000 MG: 100 INJECTION, SOLUTION INTRAVENOUS at 02:43

## 2022-07-26 RX ADMIN — LEVETIRACETAM 500 MG: 100 INJECTION, SOLUTION INTRAVENOUS at 21:51

## 2022-07-26 RX ADMIN — ASPIRIN 81 MG: 81 TABLET, COATED ORAL at 09:01

## 2022-07-26 RX ADMIN — SODIUM CHLORIDE 100 ML/HR: 9 INJECTION, SOLUTION INTRAVENOUS at 02:35

## 2022-07-26 RX ADMIN — PRAVASTATIN SODIUM 20 MG: 10 TABLET ORAL at 21:51

## 2022-07-26 RX ADMIN — SODIUM CHLORIDE, PRESERVATIVE FREE 10 ML: 5 INJECTION INTRAVENOUS at 15:54

## 2022-07-26 NOTE — H&P
GENERAL GENERIC H&P/CONSULT    Subjective:  40-year-old female with past medical history of hypertension who is otherwise alert and oriented at baseline family brought her to the emergency department for confusion and disorientation that started this morning. During evaluation in the emergency department patient had witnessed tonic-clonic seizure lasting for couple minutes with postictal drowsiness. Eventually patient started regaining her mentation but is still groggy. She has received Ativan. She is accompanied by her son and daughter-in-law. Patient does not appear to be in acute distress. She is afebrile, no leukocytosis, UA is clear. BMP shows mild hypokalemia and ENMANUEL with creatinine of 1.5. Baseline creatinine is less than 1.0. Lactic acid is elevated on i-STAT. CT of the head without acute intracranial process. Patient will be admitted for further management. Past Medical History:   Diagnosis Date    Chronic pain     mid back and left side    Hypercholesterolemia 11/7/2011    Hypertension 11/6/2014      Past Surgical History:   Procedure Laterality Date    HX OTHER SURGICAL      mole removal on right arm      Prior to Admission medications    Medication Sig Start Date End Date Taking? Authorizing Provider   traMADol-acetaminophen (ULTRACET) 37.5-325 mg per tablet Take 1 Tab by mouth every eight (8) hours as needed for Pain. Max Daily Amount: 3 Tabs. 11/1/16   Debbie Pemberton MD   losartan (COZAAR) 50 mg tablet Take 1 Tab by mouth daily. For blood pressure 10/14/16   Debbie Pemberton MD   cyclobenzaprine (FLEXERIL) 5 mg tablet Take 1 Tab by mouth nightly. Indications: MUSCLE SPASM 10/14/16   Debbie Pemberton MD   pravastatin (PRAVACHOL) 20 mg tablet Take 1 tablet by mouth nightly. For cholesterol 11/6/14   Anisa Israel MD   aspirin delayed-release 81 mg tablet Take 1 Tab by mouth daily.  9/30/13   Anisa Israel MD     Allergies   Allergen Reactions    Lisinopril Cough      Social History Tobacco Use    Smoking status: Never    Smokeless tobacco: Never   Substance Use Topics    Alcohol use: No      Family History   Problem Relation Age of Onset    Cancer Mother         colon    Heart Attack Father     Kidney Disease Sister         on dialysis      Review of Systems   Unable to perform ROS: Mental status change     Objective:    07/25 1901 - 07/26 0700  In: 1000 [I.V.:1000]  Out: -   No intake/output data recorded. Patient Vitals for the past 8 hrs:   BP Temp Pulse Resp SpO2   07/26/22 0130 (!) 164/86 -- 69 14 98 %   07/26/22 0000 (!) 172/81 -- 79 17 99 %   07/25/22 2300 (!) 165/64 -- 61 15 98 %   07/25/22 2200 (!) 161/69 -- 67 19 100 %   07/25/22 2100 (!) 138/57 -- 63 15 100 %   07/25/22 2030 (!) 172/81 -- 94 24 100 %   07/25/22 1945 (!) 200/107 98.1 °F (36.7 °C) 94 26 97 %     Physical Exam  Constitutional:       General: She is not in acute distress. Appearance: Normal appearance. Comments: Drowsy, maintaining airway   HENT:      Head: Normocephalic and atraumatic. Mouth/Throat:      Mouth: Mucous membranes are dry. Pharynx: Oropharynx is clear. Eyes:      Extraocular Movements: Extraocular movements intact. Conjunctiva/sclera: Conjunctivae normal.      Pupils: Pupils are equal, round, and reactive to light. Cardiovascular:      Rate and Rhythm: Normal rate. Pulses: Normal pulses. Heart sounds: Normal heart sounds. No murmur heard. No friction rub. No gallop. Pulmonary:      Effort: Pulmonary effort is normal.      Breath sounds: Normal breath sounds. Abdominal:      General: Bowel sounds are normal.      Palpations: Abdomen is soft. Musculoskeletal:         General: Normal range of motion. Cervical back: Normal range of motion and neck supple. Skin:     General: Skin is warm and dry. Neurological:      General: No focal deficit present. Cranial Nerves: No cranial nerve deficit. Motor: No weakness.       Comments: Drowsy and difficult to arouse. Moves all extremities spontaneously        Labs:    Recent Results (from the past 24 hour(s))   BLOOD GAS,CHEM8,LACTIC ACID POC    Collection Time: 07/25/22  8:03 PM   Result Value Ref Range    Calcium, ionized (POC) 1.33 (H) 1.12 - 1.32 mmol/L    BICARBONATE 25 mmol/L    Base deficit (POC) 5.4 mmol/L    Sample source VENOUS BLOOD      CO2, POC 26 (H) 19 - 24 MMOL/L    Sodium,  136 - 145 MMOL/L    Potassium, POC 3.2 (L) 3.5 - 5.5 MMOL/L    Chloride,  100 - 108 MMOL/L    Glucose,  (H) 74 - 106 MG/DL    Creatinine, POC 1.5 (H) 0.6 - 1.3 MG/DL    Lactic Acid (POC) 11.37 (HH) 0.40 - 2.00 mmol/L    Critical value read back ALEJANDRO     pH, venous (POC) 7.17 (LL) 7.32 - 7.42      pCO2, venous (POC) 67.0 (H) 41 - 51 MMHG    pO2, venous (POC) 50 (H) 25 - 40 mmHg   CBC WITH AUTOMATED DIFF    Collection Time: 07/25/22  8:09 PM   Result Value Ref Range    WBC 7.2 3.6 - 11.0 K/uL    RBC 4.22 3.80 - 5.20 M/uL    HGB 13.1 11.5 - 16.0 g/dL    HCT 41.6 35.0 - 47.0 %    MCV 98.6 80.0 - 99.0 FL    MCH 31.0 26.0 - 34.0 PG    MCHC 31.5 30.0 - 36.5 g/dL    RDW 13.8 11.5 - 14.5 %    PLATELET 114 812 - 121 K/uL    MPV 11.8 8.9 - 12.9 FL    NRBC 0.0 0  WBC    ABSOLUTE NRBC 0.00 0.00 - 0.01 K/uL    NEUTROPHILS 52 32 - 75 %    LYMPHOCYTES 34 12 - 49 %    MONOCYTES 12 5 - 13 %    EOSINOPHILS 1 0 - 7 %    BASOPHILS 1 0 - 1 %    IMMATURE GRANULOCYTES 0 0.0 - 0.5 %    ABS. NEUTROPHILS 3.7 1.8 - 8.0 K/UL    ABS. LYMPHOCYTES 2.4 0.8 - 3.5 K/UL    ABS. MONOCYTES 0.8 0.0 - 1.0 K/UL    ABS. EOSINOPHILS 0.1 0.0 - 0.4 K/UL    ABS. BASOPHILS 0.1 0.0 - 0.1 K/UL    ABS. IMM.  GRANS. 0.0 0.00 - 0.04 K/UL    DF AUTOMATED     METABOLIC PANEL, COMPREHENSIVE    Collection Time: 07/25/22  8:09 PM   Result Value Ref Range    Sodium 139 136 - 145 mmol/L    Potassium 3.3 (L) 3.5 - 5.1 mmol/L    Chloride 101 97 - 108 mmol/L    CO2 23 21 - 32 mmol/L    Anion gap 15 5 - 15 mmol/L    Glucose 124 (H) 65 - 100 mg/dL    BUN 19 6 - 20 MG/DL    Creatinine 1.50 (H) 0.55 - 1.02 MG/DL    BUN/Creatinine ratio 13 12 - 20      GFR est AA 40 (L) >60 ml/min/1.73m2    GFR est non-AA 33 (L) >60 ml/min/1.73m2    Calcium 10.6 (H) 8.5 - 10.1 MG/DL    Bilirubin, total 0.4 0.2 - 1.0 MG/DL    ALT (SGPT) 14 12 - 78 U/L    AST (SGOT) 19 15 - 37 U/L    Alk. phosphatase 70 45 - 117 U/L    Protein, total 7.5 6.4 - 8.2 g/dL    Albumin 3.7 3.5 - 5.0 g/dL    Globulin 3.8 2.0 - 4.0 g/dL    A-G Ratio 1.0 (L) 1.1 - 2.2     TSH 3RD GENERATION    Collection Time: 07/25/22  8:09 PM   Result Value Ref Range    TSH 3.27 0.36 - 3.74 uIU/mL   TROPONIN-HIGH SENSITIVITY    Collection Time: 07/25/22  8:09 PM   Result Value Ref Range    Troponin-High Sensitivity 27 0 - 51 ng/L   SAMPLES BEING HELD    Collection Time: 07/25/22  8:09 PM   Result Value Ref Range    SAMPLES BEING HELD BLUE     COMMENT        Add-on orders for these samples will be processed based on acceptable specimen integrity and analyte stability, which may vary by analyte.    EKG, 12 LEAD, INITIAL    Collection Time: 07/25/22  8:27 PM   Result Value Ref Range    Ventricular Rate 90 BPM    Atrial Rate 90 BPM    P-R Interval 164 ms    QRS Duration 90 ms    Q-T Interval 386 ms    QTC Calculation (Bezet) 472 ms    Calculated P Axis 41 degrees    Calculated R Axis -23 degrees    Calculated T Axis 102 degrees    Diagnosis       Sinus rhythm with frequent premature ventricular complexes  Possible Left atrial enlargement  Left ventricular hypertrophy with repolarization abnormality  When compared with ECG of 14-JAN-2022 07:40,  premature ventricular complexes are now present  T wave amplitude has increased in Inferior leads  T wave inversion less evident in Lateral leads     URINALYSIS W/ REFLEX CULTURE    Collection Time: 07/25/22  9:26 PM    Specimen: Urine   Result Value Ref Range    Color YELLOW/STRAW      Appearance CLOUDY (A) CLEAR      Specific gravity 1.018      pH (UA) 7.0 5.0 - 8.0      Protein 30 (A) NEG mg/dL    Glucose Negative NEG mg/dL    Ketone TRACE (A) NEG mg/dL    Bilirubin Negative NEG      Blood Negative NEG      Urobilinogen 1.0 0.2 - 1.0 EU/dL    Nitrites Negative NEG      Leukocyte Esterase Negative NEG      WBC 0-4 0 - 4 /hpf    RBC 0-5 0 - 5 /hpf    Epithelial cells MODERATE (A) FEW /lpf    Bacteria Negative NEG /hpf    UA:UC IF INDICATED CULTURE NOT INDICATED BY UA RESULT CNI     DRUG SCREEN, URINE    Collection Time: 07/25/22  9:26 PM   Result Value Ref Range    AMPHETAMINES Negative NEG      BARBITURATES Negative NEG      BENZODIAZEPINES Negative NEG      COCAINE Negative NEG      METHADONE Negative NEG      OPIATES Negative NEG      PCP(PHENCYCLIDINE) Negative NEG      THC (TH-CANNABINOL) Negative NEG      Drug screen comment (NOTE)    BLOOD GAS,LACTIC ACID, POC    Collection Time: 07/25/22 10:49 PM   Result Value Ref Range    pH (POC) 7.36 7.35 - 7.45      pCO2 (POC) 54.8 (H) 35.0 - 45.0 MMHG    pO2 (POC) 26 (LL) 80 - 100 MMHG    HCO3 (POC) 31.2 (H) 22 - 26 MMOL/L    sO2 (POC) 42.9 (L) 92 - 97 %    Base excess (POC) 4.6 mmol/L    Specimen type (POC) VENOUS BLOOD      Performed by Femi Leslie RN     Lactic Acid (POC) 1.06 0.40 - 2.00 mmol/L       Assessment:  Active Problems:    New onset seizure ([facility] [address] (7/25/2022)  New onset seizure  -Witnessed seizure in the ER, description is consistent with tonic-clonic seizure  -CT head without acute intracranial process  -No infectious or significant metabolic derangement identified  -Loaded with Keppra  -Obtain MRI brain  -EEG  -Neurology consult    Acute kidney injury  -IV crystalloid challenge  -Follow-up BMP    Hypokalemia  -KCl when able to tolerate oral    Hypertension  -Losartan    DVT prophylaxis: Lovenox    DNR/DNI  -CODE STATUS discussed with son and daughter-in-law at bedside      Signed:  Kamari Yin MD 7/26/2022

## 2022-07-26 NOTE — ED NOTES
Patient began seizing at this time, Dr. Jaycee Alfred called to bedside, seizure precautions in place.

## 2022-07-26 NOTE — ED NOTES
TRANSFER - OUT REPORT:    Verbal report given to Lidia on Jim Iqbal  being transferred to 3117(Neuro Tele) for routine progression of care . Report consisted of patients Situation, Background, Assessment and   Recommendations(SBAR). Information from the following report(s) SBAR, Kardex, ED Summary, Intake/Output, MAR, and Recent Results was reviewed with the receiving nurse. Lines:   Peripheral IV 07/25/22 Anterior;Proximal;Right Forearm (Active)        Opportunity for questions and clarification was provided.       Patient transported with:   [employer]

## 2022-07-26 NOTE — PROGRESS NOTES
Endurance Catheter insertion note     Inserted 20 G, 6 cm long  Endurance Extended Dwell Peripheral Catheter into right upper arm using ultrasound guidance and sterile technique. Positive blood return. Patient tolerated procedure well. Denies questions or concerns at this time. The Endurance catheter is an extended dwell peripheral catheter and may remain in place 29 days. Blood samples can be obtained from this catheter. To obtain labs, a tourniquet may be used, flush with 10ml NS, waste 3ml, draw labs, flush with 20ml NS. Dressings needs to be changed with central line dressing kit using bio patch and stat lock every 7 days by nurse. Primary nurse, Bakari Otto, RN notified. Jacey Ogden RN .   Vascular Access Team. PICC Nurse

## 2022-07-26 NOTE — PROGRESS NOTES
Patient family member (daughter) called and stated that patient has been taking \"Lazarus Oil\" at home over the counter. Patients family stated the side effects were increased urination, runny nose, and eye tearing. MD made aware.

## 2022-07-26 NOTE — PROGRESS NOTES
Problem: Falls - Risk of  Goal: *Absence of Falls  Description: Document Valeria Najera Fall Risk and appropriate interventions in the flowsheet.   Outcome: Progressing Towards Goal  Note: Fall Risk Interventions:  Mobility Interventions: Bed/chair exit alarm    Mentation Interventions: Bed/chair exit alarm, Door open when patient unattended    Medication Interventions: Assess postural VS orthostatic hypotension, Bed/chair exit alarm    Elimination Interventions: Bed/chair exit alarm

## 2022-07-26 NOTE — PROGRESS NOTES
Problem: Self Care Deficits Care Plan (Adult)  Goal: *Acute Goals and Plan of Care (Insert Text)  Description: FUNCTIONAL STATUS PRIOR TO ADMISSION: Patient was modified independent using a rollator for functional mobility. Reports having 5 children that live locally who assist with meals and transportation. Pt reports hx of 3-4 recent falls. Pt report she still completes light gardening and IADLs, unsure if pt is reliable historian. HOME SUPPORT: The patient lived alone with supportive family to provide assistance. Occupational Therapy Goals  Initiated 7/26/2022  1. Patient will perform lower body dressing with moderate assistance  within 7 day(s). 2.  Patient will perform upper body dressing with minimal assistance/contact guard assist within 7 day(s). 3.  Patient will perform seated bathing with moderate assistance  within 7 day(s). 4.  Patient will perform toilet transfers with moderate assistance  within 7 day(s). 5.  Patient will perform all aspects of toileting with moderate assistance  within 7 day(s). 6.  Patient will utilize energy conservation techniques during functional activities with verbal cues within 7 day(s). Outcome: Progressing Towards Goal   OCCUPATIONAL THERAPY EVALUATION  Patient: Gisela Arredondo (89 y.o. female)  Date: 7/26/2022  Primary Diagnosis: New onset seizure ([facility] [address] [R56.9]       Precautions:   Fall, DNR    ASSESSMENT  Based on the objective data described below, the patient presents with kyphotic posturing, impaired balance, decreased endurance, and generalized weakness. Unsure if pt is reliable historian (Aox4), patient is currently functioning below their modified independent baseline for self-care and functional mobility, now completing self-care with maximal assist and functional mobility with moderate assist/min A x2 for very brief distances. Session limited as transport arriving during evaluation to take pt NANCI for testing.  Pt able to transition to EOB with min/mod A, required max A to don brief. Pt with limited BUE ROM in shoulders, reports fine motor control and BUE ROM limited by arthritis. Patient able to transition to standing with min A x2, pulling up from RW despite verbal cues to correct. Able to complete a few brief steps to transport stretcher with min Ax2 to maintain balance and manage RW. Patient required max A to transition to supine in bed. Patient is well below baseline LOF and lives alone, recommend SNF. Current Level of Function Impacting Discharge (ADLs/self-care):   Feeding: Setup    Oral Facial Hygiene/Grooming: Minimum assistance    Bathing: Maximum assistance      Upper Body Dressing: Maximum assistance    Lower Body Dressing: Maximum assistance    Toileting: Maximum assistance    Functional Outcome Measure: The patient scored Total: 30/100 on the Barthel Index outcome measure which is indicative of being very dependent in basic self-care. Other factors to consider for discharge: below baseline, hx falls, AMS     Patient will benefit from skilled therapy intervention to address the above noted impairments. PLAN :  Recommendations and Planned Interventions: self care training, functional mobility training, therapeutic exercise, balance training, therapeutic activities, endurance activities, patient education, home safety training, and family training/education    Frequency/Duration: Patient will be followed by occupational therapy 4 times a week to address goals. Recommendation for discharge: (in order for the patient to meet his/her long term goals)  Therapy up to 5 days/week in SNF setting    This discharge recommendation:  Has been made in collaboration with the attending provider and/or case management    IF patient discharges home will need the following DME: TBD       SUBJECTIVE:   Patient stated oh yes blaine had a few falls.     OBJECTIVE DATA SUMMARY:   HISTORY:   Past Medical History:   Diagnosis Date    Chronic pain     mid back and left side    Hypercholesterolemia 11/7/2011    Hypertension 11/6/2014     Past Surgical History:   Procedure Laterality Date    HX OTHER SURGICAL      mole removal on right arm       Expanded or extensive additional review of patient history:     Home Situation  Home Environment: Private residence  Wheelchair Ramp: Yes  One/Two Story Residence: One story  Living Alone: Yes  Support Systems: Child(mitzy) (has 5 children, they assist with meals and driving)  Patient Expects to be Discharged to[de-identified] Home  Current DME Used/Available at Home: [employer], rollator, Cane, straight, Wheelchair, Shower chair, Grab bars (uses rollator, handheld shower head; GB only in shower)  Tub or Shower Type: Shower    Hand dominance: Right    EXAMINATION OF PERFORMANCE DEFICITS:  Cognitive/Behavioral Status:  Neurologic State: Alert  Orientation Level: Oriented X4  Cognition: Appropriate decision making; Follows commands;Memory loss  Perception: Appears intact  Perseveration: No perseveration noted  Safety/Judgement: Decreased awareness of environment; Insight into deficits    Skin: intact    Edema: no edema noted    Hearing: Auditory  Auditory Impairment: Hard of hearing, bilateral    Vision/Perceptual:                           Acuity: Within Defined Limits    Corrective Lenses: Glasses    Range of Motion:    AROM: Generally decreased, functional (limited BUE shoulder ROM; kyphotic posturing)  PROM: Generally decreased, functional                      Strength:    Strength: Generally decreased, functional                Coordination:  Coordination: Generally decreased, functional  Fine Motor Skills-Upper: Left Intact; Right Intact (limited by arthritis)    Gross Motor Skills-Upper: Left Intact; Right Intact    Tone & Sensation:    Tone: Normal  Sensation: Intact                      Balance:  Sitting: Impaired  Sitting - Static: Good (unsupported)  Sitting - Dynamic: Fair (occasional)  Standing: Impaired  Standing - Static: Fair;Constant support  Standing - Dynamic : Fair;Constant support    Functional Mobility and Transfers for ADLs:  Bed Mobility:  Rolling: Minimum assistance  Supine to Sit: Moderate assistance  Sit to Supine: Maximum assistance  Scooting: Minimum assistance    Transfers:  Sit to Stand: Minimum assistance;Assist x2  Stand to Sit: Minimum assistance  Assistive Device : Walker, rolling    ADL Assessment:  Feeding: Setup    Oral Facial Hygiene/Grooming: Minimum assistance    Bathing: Maximum assistance      Upper Body Dressing: Maximum assistance    Lower Body Dressing: Maximum assistance    Toileting: Maximum assistance                ADL Intervention and task modifications:                 Type of Bath: Bath Pack              Lower Body Dressing Assistance  Protective Undergarmet: Maximum assistance         Cognitive Retraining  Safety/Judgement: Decreased awareness of environment; Insight into deficits     Functional Measure:    Barthel Index:  Bathin  Bladder: 5  Bowels: 5  Groomin  Dressin  Feedin  Mobility: 0  Stairs: 0  Toilet Use: 5  Transfer (Bed to Chair and Back): 5  Total: 30/100      The Barthel ADL Index: Guidelines  1. The index should be used as a record of what a patient does, not as a record of what a patient could do. 2. The main aim is to establish degree of independence from any help, physical or verbal, however minor and for whatever reason. 3. The need for supervision renders the patient not independent. 4. A patient's performance should be established using the best available evidence. Asking the patient, friends/relatives and nurses are the usual sources, but direct observation and common sense are also important. However direct testing is not needed. 5. Usually the patient's performance over the preceding 24-48 hours is important, but occasionally longer periods will be relevant. 6. Middle categories imply that the patient supplies over 50 per cent of the effort.   7. Use of aids to be independent is allowed. Score Interpretation (from [facility])    Independent   60-79 Minimally independent   40-59 Partially dependent   20-39 Very dependent   <20 Totally dependent     -Jayesh Gardner., Barthel, D.W. (1965). Functional evaluation: the Barthel Index. [address] ([address] (1997). The Barthel activities of daily living index: self-reporting versus actual performance in the old (> or = 75 years). Journal [address] 45(7), 14 [facility], J.J.M.F, Shonda Velasco., Hemal Bowman. (1999). Measuring the change in disability after inpatient rehabilitation; comparison of the responsiveness of the Barthel Index and Functional [place] Measure. Journal of Neurology, Neurosurgery, and Psychiatry, 66(4), 643-178. EDUARDO Mora, TELLY Valdes, & Dorothea Gutierrez M.A. (2004) Assessment of post-stroke quality of life in cost-effectiveness studies: The usefulness of the Barthel Index and the EuroQoL-5D. Quality of Life Research, 15, 057-38     Occupational Therapy Evaluation Charge Determination   History Examination Decision-Making   MEDIUM Complexity : Expanded review of history including physical, cognitive and psychosocial  history  MEDIUM Complexity : 3-5 performance deficits relating to physical, cognitive , or psychosocial skils that result in activity limitations and / or participation restrictions MEDIUM Complexity : Patient may present with comorbidities that affect occupational performnce. Miniml to moderate modification of tasks or assistance (eg, physical or verbal ) with assesment(s) is necessary to enable patient to complete evaluation       Based on the above components, the patient evaluation is determined to be of the following complexity level: MEDIUM  Pain Rating:  Pt did not report pain.      Activity Tolerance:   Fair    After treatment patient left in no apparent distress:    Supine in bed and on transport stretcher, transport staff present. COMMUNICATION/EDUCATION:   The patients plan of care was discussed with: Physical therapist, Occupational therapist, and Registered nurse. Patient/family have participated as able in goal setting and plan of care. This patients plan of care is appropriate for delegation to JUJU.     Thank you for this referral.  Onesimo Malik OT  Time Calculation: 21 mins

## 2022-07-26 NOTE — ROUTINE PROCESS
Gave verbal handoff/report of patient to Damaris Dexter RN after arrival from Emergency Room. Patient stable. Vital signs taken.       Jose Gusman RN

## 2022-07-26 NOTE — ED NOTES
Verbal shift change report given to Ova Client, RN (oncoming nurse) by James Crane RN (offgoing nurse). Report included the following information SBAR, ED Summary, Procedure Summary and MAR.

## 2022-07-26 NOTE — PROGRESS NOTES
Problem: Mobility Impaired (Adult and Pediatric)  Goal: *Acute Goals and Plan of Care (Insert Text)  Description: FUNCTIONAL STATUS PRIOR TO ADMISSION: Patient was modified independent using a rollator for functional mobility. Reports having 5 children that live locally who assist, unsure if pt is reliable historian. HOME SUPPORT PRIOR TO ADMISSION: The patient lived alone with children nearby to provide assistance. Physical Therapy Goals  Initiated 7/26/2022  1. Patient will move from supine to sit and sit to supine  in bed with minimal assistance/contact guard assist within 7 day(s). 2.  Patient will transfer from bed to chair and chair to bed with minimal assistance/contact guard assist using the least restrictive device within 7 day(s). 3.  Patient will perform sit to stand with minimal assistance/contact guard assist within 7 day(s). 4.  Patient will ambulate with minimal assistance/contact guard assist for 75 feet with the least restrictive device within 7 day(s). Outcome: Progressing Towards Goal     PHYSICAL THERAPY EVALUATION  Patient: Gisela Arredondo (8 y.o. female)  Date: 7/26/2022  Primary Diagnosis: New onset seizure ([facility] [address] [R56.9]       Precautions:   Fall, DNR      ASSESSMENT  Based on the objective data described below, the patient presents with decreased command following, poor safety awareness, impulsivity, overall weakness, difficulty with transfers, impaired balance, and altered gait. Pt presents on room air supine on stretcher returning to floor from testing. Madyson for supine > sit on stretcher. Sit > stand with Madyson and RW. Ambulates 15ft to bed with Madyson and RW,  additionally has severe kyphotic posturing, narrow POPPY, accelerated pace, and decreased step clearance. Pt requires verbal and manual assistance for safe use of RW, impulsive with movements and poor command following throughout.  Ankle pumps, LAQ and marching completed in sitting, has difficulty following commands to create coordinated movement but able to perform each exercise without difficulty. Sit > stand performed with Madyson, verbal, and manual cueing for safe use of RW. Unable to follow commands for safe use of RW, pt is very unsteady despite use of AD so returned to sitting. Sit > supine with ModA. Pt left supine in bed with call bell within reach. Will recommend SNF upon d/c, pt lives alone and is a very high falls risk if to return upon upon d/c. Current Level of Function Impacting Discharge (mobility/balance): Min-Mod    Functional Outcome Measure: The patient scored Total: 30/100 on the Barthel Index outcome measure which is indicative of being Very Dependent in basic self-care. Other factors to consider for discharge: cognition, lives alone     Patient will benefit from skilled therapy intervention to address the above noted impairments. PLAN :  Recommendations and Planned Interventions: bed mobility training, transfer training, gait training, therapeutic exercises, neuromuscular re-education, patient and family training/education, and therapeutic activities      Frequency/Duration: Patient will be followed by physical therapy:  4 times a week to address goals. Recommendation for discharge: (in order for the patient to meet his/her long term goals)  Therapy up to 5 days/week in SNF setting    This discharge recommendation:  Has been made in collaboration with the attending provider and/or case management    IF patient discharges home will need the following DME: TBD         SUBJECTIVE:   Patient stated I have 5 kids.     OBJECTIVE DATA SUMMARY:   HISTORY:    Past Medical History:   Diagnosis Date    Chronic pain     mid back and left side    Hypercholesterolemia 11/7/2011    Hypertension 11/6/2014     Past Surgical History:   Procedure Laterality Date    HX OTHER SURGICAL      mole removal on right arm       Personal factors and/or comorbidities impacting plan of care:     [address] [place] [address] Environment: Private residence  Wheelchair Ramp: Yes  One/Two Story Residence: One story  Living Alone: Yes  Support Systems: Child(mitzy) (has 5 children, they assist with meals and driving)  Patient Expects to be Discharged to[de-identified] Skilled nursing facility  Current DME Used/Available at Home: Walker, rollator, Aristides Celi, straight, Wheelchair, [address] chair, Grab bars (uses rollator, handheld shower head; GB only in shower)  Tub or Shower Type: Shower    EXAMINATION/PRESENTATION/DECISION MAKING:   Critical Behavior:  Neurologic State: Alert  Orientation Level: Oriented X4  Cognition: Appropriate decision making, Follows commands, Memory loss  Safety/Judgement: Decreased awareness of environment, Insight into deficits  Hearing: Auditory  Auditory Impairment: Hard of hearing, bilateral  Skin:  intact  Edema: none  Range Of Motion:  AROM: Generally decreased, functional (severe kyphotic posturing)           PROM: Generally decreased, functional           Strength:    Strength: Generally decreased, functional                    Tone & Sensation:   Tone: Normal              Sensation: Intact               Coordination:  Coordination: Generally decreased, functional  Vision:   Acuity: Within Defined Limits  Corrective Lenses: Glasses  Functional Mobility:  Bed Mobility:  Rolling: Minimum assistance  Supine to Sit: Minimum assistance  Sit to Supine:  Moderate assistance  Scooting: Contact guard assistance  Transfers:  Sit to Stand: Minimum assistance;Assist x1  Stand to Sit: Minimum assistance;Assist x1                       Balance:   Sitting: Impaired  Sitting - Static: Fair (occasional)  Sitting - Dynamic: Fair (occasional)  Standing: Impaired  Standing - Static: Fair;Constant support  Standing - Dynamic : Fair;Constant support  Ambulation/Gait Training:  Distance (ft): 15 Feet (ft)  Assistive Device: Walker, rolling;Gait belt  Ambulation - Level of Assistance: Minimal assistance;Assist x1        Gait Abnormalities: Decreased step clearance;Shuffling gait (significant thoracic kyphosis)        Base of Support: Narrowed     Speed/Irlanda: Shuffled; Accelerated  Step Length: Right shortened;Left shortened                     Stairs: Therapeutic Exercises: Ankle pumps, LAQ, seated marching    Functional Measure:  Barthel Index:    Bathin  Bladder: 5  Bowels: 5  Groomin  Dressin  Feedin  Mobility: 0  Stairs: 0  Toilet Use: 5  Transfer (Bed to Chair and Back): 5  Total: 30/100       The Barthel ADL Index: Guidelines  1. The index should be used as a record of what a patient does, not as a record of what a patient could do. 2. The main aim is to establish degree of independence from any help, physical or verbal, however minor and for whatever reason. 3. The need for supervision renders the patient not independent. 4. A patient's performance should be established using the best available evidence. Asking the patient, friends/relatives and nurses are the usual sources, but direct observation and common sense are also important. However direct testing is not needed. 5. Usually the patient's performance over the preceding 24-48 hours is important, but occasionally longer periods will be relevant. 6. Middle categories imply that the patient supplies over 50 per cent of the effort. 7. Use of aids to be independent is allowed. Score Interpretation (from [address] [address])    Independent   60-79 Minimally independent   40-59 Partially dependent   20-39 Very dependent   <20 Totally dependent     -Jayesh Gardner., Barthel, D.W. (1965). Functional evaluation: the Barthel Index. [facility] ([address] (). The Barthel activities of daily living index: self-reporting versus actual performance in the old (> or = 75 years). Journal of [address] [place] [address] 45(7), 14 [facility], EVELYNE, Alix Hernández., Ren Ibrahim. ().  Measuring the change in disability after inpatient rehabilitation; comparison of the responsiveness of the Barthel Index and Functional [place] Measure. Journal of Neurology, Neurosurgery, and Psychiatry, 66(4), 356-377. MARIA DEL CARMEN Ascencio.ROSINA, TELLY Valdes, & Ciera Dominguez M.A. (2004) Assessment of post-stroke quality of life in cost-effectiveness studies: The usefulness of the Barthel Index and the EuroQoL-5D. Quality of Life Research, 15, 571-39        Physical Therapy Evaluation Charge Determination   History Examination Presentation Decision-Making   MEDIUM  Complexity : 1-2 comorbidities / personal factors will impact the outcome/ POC  LOW Complexity : 1-2 Standardized tests and measures addressing body structure, function, activity limitation and / or participation in recreation  LOW Complexity : Stable, uncomplicated  Other outcome measures Barthel  HIGH       Based on the above components, the patient evaluation is determined to be of the following complexity level: LOW     Pain Rating:      Activity Tolerance:   Good    After treatment patient left in no apparent distress:   Supine in bed and Call bell within reach    COMMUNICATION/EDUCATION:   The patients plan of care was discussed with: Occupational therapist, Registered nurse, and Case management. Patient/family have participated as able in goal setting and plan of care. Thank you for this referral.  Erlinda Parham, [facility]   Time Calculation: 12 mins    Regarding student involvement in patient care:  A student participated in this treatment session. Per CMS Medicare statements and APTA guidelines I certify that the following was true:  1. I was present and directly observed the entire session. 2. I made all skilled judgments and clinical decisions regarding care. 3. I am the practitioner responsible for assessment, treatment, and documentation.

## 2022-07-26 NOTE — PROGRESS NOTES
Care Management Interventions  PCP Verified by CM: Yes (Dr. Johanna Lewis)  Transition of Care Consult (CM Consult): Home Health  Discharge Durable Medical Equipment:  (patient has walker at home already, no DME equipment identified as needed)  Physical Therapy Consult: Yes  Occupational Therapy Consult: Yes  Speech Therapy Consult: Yes  Support Systems: Child(mitzy) (has 5 children, they assist with meals and driving)  Confirm Follow Up Transport: Family  The Plan for Transition of Care is Related to the Following Treatment Goals : patient refuses to go to SNF, stated she would do Home Health. [facility] needs [facility] d/t patient having [employer]  The Patient and/or Patient Representative was Provided with a Choice of Provider and Agrees with the Discharge Plan?: Yes  Freedom of Choice List was Provided with Basic Dialogue that Supports the Patient's Individualized Plan of Care/Goals, Treatment Preferences and Shares the Quality Data Associated with the Providers?: Yes  Discharge Location  Patient Expects to be Discharged to[de-identified] Home with home health      Patient refused SNF, stated she would accept [facility]. Advised her that if she changed her mind prior to DC to let someone know. [facility] Referral will need to be sent for [facility] d/t patient having [employer].

## 2022-07-26 NOTE — PROGRESS NOTES
-Please complete MRI History and Safety Screening Form   - Patient cannot be scanned until this form is completed, including signatures, and reviewed in MRI to ensure patient is SAFE and eligible for MRI. - CALL MRI when this has been successfully completed at [phone].

## 2022-07-26 NOTE — PROGRESS NOTES
End of Shift Note    Bedside shift change report given to [facility], RN (oncoming nurse) by Shiela Ramirez RN (offgoing nurse). Report included the following information SBAR, Kardex, and ED Summary    Shift worked:  DAYS   Shift summary and any significant changes:     -LABS DRAWN   -NEW IV PLACED       Concerns for physician to address:  NONE   Zone phone for oncoming shift:   [phone]     Patient Information  Verna Webster  80 y.o.  7/25/2022  7:35 PM by Sully Marroquin MD. Verna Webster was admitted from Home    Problem List  Patient Active Problem List    Diagnosis Date Noted    Complex partial seizures evolving to generalized tonic-clonic seizures ([facility] [address] 07/26/2022    Bilateral carotid artery stenosis 07/26/2022    Convulsive syncope 07/26/2022    Acute alteration in mental status 07/26/2022    Cerebral microvascular disease 07/26/2022    New onset seizure ([facility] [address] 07/25/2022    COVID-19 01/14/2022    Elevated troponin 01/14/2022    Sciatica of right side 05/23/2016    Essential hypertension 05/07/2015    Hypercholesterolemia 11/07/2011    Chronic pain      Past Medical History:   Diagnosis Date    Chronic pain     mid back and left side    Hypercholesterolemia 11/7/2011    Hypertension 11/6/2014       Core Measures:  CVA: No No  CHF:No No  PNA:No No    Activity:  Activity Level: Up with Assistance  Number times ambulated in hallways past shift: 0  Number of times OOB to chair past shift: 1    Cardiac:   Cardiac Monitoring: Yes      Cardiac Rhythm: Sinus Yefri    Access:   Current line(s): PIV     Genitourinary:   Urinary status: voiding and incontinent    Respiratory:      Chronic home O2 use?: N/A  Incentive spirometer at bedside: N/A       GI:     Current diet:  ADULT DIET Regular;  Low Sodium (2 gm)  Passing flatus: YES  Tolerating current diet: YES       Pain Management:   Patient states pain is manageable on current regimen: YES    Skin:  Maxmio Score: 17  Interventions: increase time out of bed    Patient Safety:  Fall Score: Total Score: 4  Interventions: bed/chair alarm, assistive device (walker, cane, etc), and gripper socks  High Fall Risk: Yes  @Rollbelt  @dexterity to release roll belt  Yes/No ( must document dexterity  here by stating Yes or No here, otherwise this is a restraint and must follow restraint documentation policy.)    DVT prophylaxis:  DVT prophylaxis Med- Yes  DVT prophylaxis SCD or DEISY- No     Wounds: (If Applicable)  Wounds- Yes  Location LEFT BREAST AND SKIN TEAR LEFT ARM    Active Consults:  IP CONSULT TO HOSPITALIST  IP CONSULT TO NEUROLOGY    Length of Stay:  Expected LOS: - - -  Actual LOS: 0  Discharge Plan: No SNF?       Briana Lord RN

## 2022-07-26 NOTE — PROGRESS NOTES
Hospitalist Progress Note    NAME: Leonardo Schaeffer   :  1932   MRN:  [id number]       Assessment / Plan:  New onset seizure  -Witnessed seizure in the ER, description is consistent with tonic-clonic seizure  -CT head without acute intracranial process  -No infectious or significant metabolic derangement identified  -Loaded with Keppra, c/w keppra 500 mg Q12  -Obtain MRI brain, if patient allows. May need to get done an open MRI as outpatient  -EEG done awaiting read  -Neurology consulted  -PT/OT  -Valium PRN   -Lactic acidosis resolved     Acute kidney injury (resolved)  -Follow-up BMP  -Avoid nephrotoxins     Hypokalemia (resolved)      Hypertension  -c/w Losartan, may need to uptitrate    DVT prophylaxis: Lovenox    DNR/DNI      Estimated discharge date:   Barriers: Neuro eval    Code status: DNR  Prophylaxis: Lovenox  Recommended Disposition: Home w/Family     Subjective:     Chief Complaint / Reason for Physician Visit  Patient seen and examined at the bedside. She reports feeling well. She is adamant that she does not want an MRI. She denies any pain. Discussed with RN events overnight. Michele Mullen, child, updated. Review of Systems:  Symptom Y/N Comments  Symptom Y/N Comments   Fever/Chills    Chest Pain     Poor Appetite    Edema     Cough    Abdominal Pain     Sputum    Joint Pain     SOB/MAY    Pruritis/Rash     Nausea/vomit    Tolerating PT/OT     Diarrhea    Tolerating Diet     Constipation    Other       Could NOT obtain due to:      Objective:     VITALS:   Last 24hrs VS reviewed since prior progress note.  Most recent are:  Patient Vitals for the past 24 hrs:   Temp Pulse Resp BP SpO2   22 0800 -- 60 -- -- --   22 0456 -- -- -- (!) 142/63 --   22 0423 97.9 °F (36.6 °C) 69 16 -- --   22 0422 -- -- -- (!) 165/70 --   22 0130 -- 69 14 (!) 164/86 98 %   22 0000 -- 79 17 (!) 172/81 99 %   22 2300 -- 61 15 (!) 165/64 98 %   22 2200 -- 67 19 (!) 161/69 100 %   07/25/22 2100 -- 63 15 (!) 138/57 100 %   07/25/22 2030 -- 94 24 (!) 172/81 100 %   07/25/22 1945 98.1 °F (36.7 °C) 94 26 (!) 200/107 97 %       Intake/Output Summary (Last 24 hours) at 7/26/2022 0835  Last data filed at 7/26/2022 0139  Gross per 24 hour   Intake 1000 ml   Output --   Net 1000 ml        I had a face to face encounter and independently examined this patient on 7/26/2022, as outlined below:  PHYSICAL EXAM:  General: WD, WN. Alert, cooperative, no acute distress, frail appearing  EENT:  EOMI. Anicteric sclerae. MMM  Resp:  CTA bilaterally, no wheezing or rales. No accessory muscle use  CV:  Regular  rhythm,  No edema  GI:  Soft, Non distended, Non tender. +Bowel sounds  Neurologic:  Alert and oriented X 3, normal speech,   Psych:   Good insight. Not anxious nor agitated  Skin:  No rashes. No jaundice    Reviewed most current lab test results and cultures  YES  Reviewed most current radiology test results   YES  Review and summation of old records today    NO  Reviewed patient's current orders and MAR    YES  PMH/SH reviewed - no change compared to H&P  ________________________________________________________________________  Care Plan discussed with:    Comments   Patient     Family      RN     Care Manager     Consultant                        Multidiciplinary team rounds were held today with , nursing, pharmacist and clinical coordinator. Patient's plan of care was discussed; medications were reviewed and discharge planning was addressed.      ________________________________________________________________________  Total NON critical care TIME:  35   Minutes    Total CRITICAL CARE TIME Spent:   Minutes non procedure based      Comments   >50% of visit spent in counseling and coordination of care     ________________________________________________________________________  Venessa Lockhart MD     Procedures: see electronic medical records for all procedures/Xrays and details which were not copied into this note but were reviewed prior to creation of Plan. LABS:  I reviewed today's most current labs and imaging studies.   Pertinent labs include:  Recent Labs     07/26/22 0422 07/25/22 2009   WBC 5.4 7.2   HGB 11.5 13.1   HCT 35.9 41.6   PLT 92* 236     Recent Labs     07/26/22 0422 07/25/22 2009    139   K 4.1 3.3*   * 101   CO2 25 23   GLU 86 124*   BUN 16 19   CREA 0.83 1.50*   CA 9.2 10.6*   ALB  --  3.7   TBILI  --  0.4   ALT  --  14       Signed: Lissett Rockwell MD

## 2022-07-27 ENCOUNTER — APPOINTMENT (OUTPATIENT)
Dept: CT IMAGING | Age: 87
End: 2022-07-27
Attending: INTERNAL MEDICINE
Payer: MEDICARE

## 2022-07-27 VITALS
DIASTOLIC BLOOD PRESSURE: 65 MMHG | TEMPERATURE: 97.4 F | RESPIRATION RATE: 18 BRPM | OXYGEN SATURATION: 97 % | SYSTOLIC BLOOD PRESSURE: 174 MMHG | HEART RATE: 66 BPM

## 2022-07-27 LAB
25(OH)D3 SERPL-MCNC: 64.5 NG/ML (ref 30–100)
CK SERPL-CCNC: 32 U/L (ref 26–192)
ERYTHROCYTE [DISTWIDTH] IN BLOOD BY AUTOMATED COUNT: 13.7 % (ref 11.5–14.5)
ERYTHROCYTE [SEDIMENTATION RATE] IN BLOOD: 17 MM/HR (ref 0–30)
FOLATE SERPL-MCNC: 8.9 NG/ML (ref 5–21)
HCT VFR BLD AUTO: 39.1 % (ref 35–47)
HGB BLD-MCNC: 12.6 G/DL (ref 11.5–16)
MAGNESIUM SERPL-MCNC: 1.9 MG/DL (ref 1.6–2.4)
MCH RBC QN AUTO: 31 PG (ref 26–34)
MCHC RBC AUTO-ENTMCNC: 32.2 G/DL (ref 30–36.5)
MCV RBC AUTO: 96.1 FL (ref 80–99)
NRBC # BLD: 0 K/UL (ref 0–0.01)
NRBC BLD-RTO: 0 PER 100 WBC
PLATELET # BLD AUTO: 190 K/UL (ref 150–400)
PMV BLD AUTO: 11.5 FL (ref 8.9–12.9)
RBC # BLD AUTO: 4.07 M/UL (ref 3.8–5.2)
VIT B12 SERPL-MCNC: 1444 PG/ML (ref 193–986)
WBC # BLD AUTO: 6.8 K/UL (ref 3.6–11)

## 2022-07-27 PROCEDURE — G0378 HOSPITAL OBSERVATION PER HR: HCPCS

## 2022-07-27 PROCEDURE — 82607 VITAMIN B-12: CPT

## 2022-07-27 PROCEDURE — 82306 VITAMIN D 25 HYDROXY: CPT

## 2022-07-27 PROCEDURE — 83735 ASSAY OF MAGNESIUM: CPT

## 2022-07-27 PROCEDURE — 85652 RBC SED RATE AUTOMATED: CPT

## 2022-07-27 PROCEDURE — 82746 ASSAY OF FOLIC ACID SERUM: CPT

## 2022-07-27 PROCEDURE — 74011250636 HC RX REV CODE- 250/636: Performed by: INTERNAL MEDICINE

## 2022-07-27 PROCEDURE — 85027 COMPLETE CBC AUTOMATED: CPT

## 2022-07-27 PROCEDURE — 74011250637 HC RX REV CODE- 250/637: Performed by: INTERNAL MEDICINE

## 2022-07-27 PROCEDURE — 96372 THER/PROPH/DIAG INJ SC/IM: CPT

## 2022-07-27 PROCEDURE — 99214 OFFICE O/P EST MOD 30 MIN: CPT | Performed by: PSYCHIATRY & NEUROLOGY

## 2022-07-27 PROCEDURE — 86038 ANTINUCLEAR ANTIBODIES: CPT

## 2022-07-27 PROCEDURE — 80177 DRUG SCRN QUAN LEVETIRACETAM: CPT

## 2022-07-27 PROCEDURE — 82550 ASSAY OF CK (CPK): CPT

## 2022-07-27 PROCEDURE — 36415 COLL VENOUS BLD VENIPUNCTURE: CPT

## 2022-07-27 PROCEDURE — 96366 THER/PROPH/DIAG IV INF ADDON: CPT

## 2022-07-27 RX ORDER — LEVETIRACETAM 500 MG/1
500 TABLET ORAL 2 TIMES DAILY
Qty: 60 TABLET | Refills: 0 | Status: SHIPPED | OUTPATIENT
Start: 2022-07-27

## 2022-07-27 RX ORDER — LOSARTAN POTASSIUM 100 MG/1
100 TABLET ORAL DAILY
Qty: 30 TABLET | Refills: 0 | Status: SHIPPED | OUTPATIENT
Start: 2022-07-28

## 2022-07-27 RX ORDER — LOSARTAN POTASSIUM 50 MG/1
100 TABLET ORAL DAILY
Status: DISCONTINUED | OUTPATIENT
Start: 2022-07-28 | End: 2022-07-27 | Stop reason: HOSPADM

## 2022-07-27 RX ADMIN — ACETAMINOPHEN 650 MG: 325 TABLET ORAL at 12:50

## 2022-07-27 RX ADMIN — ASPIRIN 81 MG: 81 TABLET, COATED ORAL at 10:08

## 2022-07-27 RX ADMIN — LOSARTAN POTASSIUM 50 MG: 50 TABLET, FILM COATED ORAL at 10:08

## 2022-07-27 RX ADMIN — HEPARIN SODIUM 5000 UNITS: 5000 INJECTION INTRAVENOUS; SUBCUTANEOUS at 10:08

## 2022-07-27 RX ADMIN — LEVETIRACETAM 500 MG: 100 SOLUTION ORAL at 12:00

## 2022-07-27 NOTE — PROGRESS NOTES
End of Shift Note    Bedside shift change report given to Tristin Bullock RN (oncoming nurse) by Eduarda Cruz RN (offgoing nurse). Report included the following information SBAR, Kardex, and ED Summary    Shift worked:  Nights    Shift summary and any significant changes:    No significant events over the night. Labs drawn     Endurance cath not drawing back blood. R arm swollen +2, possible infiltration. Fluids held. Concerns for physician to address:  NONE   Zone phone for oncoming shift:   [phone]     Patient Information  Jim Iqbal  80 y.o.  7/25/2022  7:35 PM by Kamari Yin MD. 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 seizures ([facility] [address] 07/26/2022    Bilateral carotid artery stenosis 07/26/2022    Convulsive syncope 07/26/2022    Acute alteration in mental status 07/26/2022    Cerebral microvascular disease 07/26/2022    New onset seizure ([facility] [address] 07/25/2022    COVID-19 01/14/2022    Elevated troponin 01/14/2022    Sciatica of right side 05/23/2016    Essential hypertension 05/07/2015    Hypercholesterolemia 11/07/2011    Chronic pain      Past Medical History:   Diagnosis Date    Chronic pain     mid back and left side    Hypercholesterolemia 11/7/2011    Hypertension 11/6/2014       Core Measures:  CVA: No No  CHF:No No  PNA:No No    Activity:  Activity Level: Up with Assistance  Number times ambulated in hallways past shift: 0  Number of times OOB to chair past shift: 1    Cardiac:   Cardiac Monitoring: Yes      Cardiac Rhythm: Sinus Raul Ripper, Sinus Rhythm    Access:   Current line(s): PIV     Genitourinary:   Urinary status: voiding and incontinent    Respiratory:      Chronic home O2 use?: N/A  Incentive spirometer at bedside: N/A       GI:     Current diet:  ADULT DIET Regular;  Low Sodium (2 gm)  Passing flatus: YES  Tolerating current diet: YES       Pain Management: Patient states pain is manageable on current regimen: YES    Skin:  Maximo Score: 17  Interventions: increase time out of bed    Patient Safety:  Fall Score: Total Score: 4  Interventions: bed/chair alarm, assistive device (walker, cane, etc), and gripper socks  High Fall Risk: Yes  @Rollbelt  @dexterity to release roll belt  Yes/No ( must document dexterity  here by stating Yes or No here, otherwise this is a restraint and must follow restraint documentation policy.)    DVT prophylaxis:  DVT prophylaxis Med- Yes  DVT prophylaxis SCD or DEISY- No     Wounds: (If Applicable)  Wounds- Yes  Location LEFT BREAST AND SKIN TEAR LEFT ARM    Active Consults:  IP CONSULT TO HOSPITALIST  IP CONSULT TO NEUROLOGY    Length of Stay:  Expected LOS: - - -  Actual LOS: 0  Discharge Plan: No SNF?       Flex James RN

## 2022-07-27 NOTE — DISCHARGE SUMMARY
Hospitalist Discharge Summary     Patient ID:  Gaston Gómez  [id number]  89 y.o.  1/18/1932    PCP on record: Libby Farah MD    Admit date: 7/25/2022  Discharge date and time: 7/27/2022      DISCHARGE DIAGNOSIS:    Seizure      CONSULTATIONS:  IP CONSULT TO HOSPITALIST  IP CONSULT TO NEUROLOGY    Excerpted HPI from H&P of Antoinette Carbajal MD:    80-year-old female with past medical history of hypertension who is otherwise alert and oriented at baseline family brought her to the emergency department for confusion and disorientation that started this morning. During evaluation in the emergency department patient had witnessed tonic-clonic seizure lasting for couple minutes with postictal drowsiness. Eventually patient started regaining her mentation but is still groggy. She has received Ativan. She is accompanied by her son and daughter-in-law. Patient does not appear to be in acute distress. She is afebrile, no leukocytosis, UA is clear. BMP shows mild hypokalemia and ENMANUEL with creatinine of 1.5. Baseline creatinine is less than 1.0. Lactic acid is elevated on i-STAT. CT of the head without acute intracranial process. Patient will be admitted for further management.  ______________________________________________________________________  DISCHARGE SUMMARY/HOSPITAL COURSE:  for full details see H&P, daily progress notes, labs, consult notes. New onset seizure  -Witnessed seizure in the ER, description is consistent with tonic-clonic seizure  -CT head without acute intracranial process  -No infectious or significant metabolic derangement identified  -Loaded with Keppra, c/w keppra 500 mg Q12  -Patient adamantly refused MRI. Also refused repeat CT scan  -EEG showed an essentially normal electroencephalogram with the patient awake and asleep showing no clear areas of focal slowing. No clear dysrhythmic activity and no electrographic spells of any type.   Clinical correlation recommended.  -Neurology recs appreciated  -PT/OT - recommending SNF, patient wishes to pursue home with home health  -Lactic acidosis resolved     Acute kidney injury (resolved)  -Follow-up BMP  -Avoid nephrotoxins     Hypokalemia (resolved)      Hypertension  -uptitrate losartan to 100 mg PO daily    Care was discuss with patient's two children at bedside. Patient was adamant about wanting to go home and she was stable for discharge home with home health.  _______________________________________________________________________  Patient seen and examined by me on discharge day. Pertinent Findings:  Gen:    Not in distress  Chest: Clear lungs  CVS:   Regular rhythm. No edema  Abd:  Soft, not distended, not tender  Neuro:  Alert, Oriented x 4, grossly non focal exam  _______________________________________________________________________  DISCHARGE MEDICATIONS:   Current Discharge Medication List        START taking these medications    Details   levETIRAcetam (Keppra) 500 mg tablet Take 1 Tablet by mouth two (2) times a day. Qty: 60 Tablet, Refills: 0  Start date: 7/27/2022           CONTINUE these medications which have CHANGED    Details   losartan (COZAAR) 100 mg tablet Take 1 Tablet by mouth in the morning. Qty: 30 Tablet, Refills: 0  Start date: 7/28/2022           CONTINUE these medications which have NOT CHANGED    Details   traMADol-acetaminophen (ULTRACET) 37.5-325 mg per tablet Take 1 Tab by mouth every eight (8) hours as needed for Pain. Max Daily Amount: 3 Tabs. Qty: 30 Tab, Refills: 0    Comments: Do not take with flexeril or hydrocodone. cyclobenzaprine (FLEXERIL) 5 mg tablet Take 1 Tab by mouth nightly. Indications: MUSCLE SPASM  Qty: 20 Tab, Refills: 1    Associated Diagnoses: Sciatica of right side      pravastatin (PRAVACHOL) 20 mg tablet Take 1 tablet by mouth nightly. For cholesterol  Qty: 90 tablet, Refills: 3      aspirin delayed-release 81 mg tablet Take 1 Tab by mouth daily. My Recommended Diet, Activity, Wound Care, and follow-up labs are listed in the patient's Discharge Insturctions which I have personally completed and reviewed. Risk of deterioration: Moderate    Condition at Discharge:  Stable  _____________________________________________________________________    Disposition  Home with family and home health services  ____________________________________________________________________    Care Plan discussed with:   Patient, Family, RN, Care Manager, Consultant    ____________________________________________________________________    Code Status: DNR/DNI  ____________________________________________________________________      Condition at Discharge:  Stable  _____________________________________________________________________  Follow up with:   PCP : Dale Arzola MD  Follow-up Information       Follow up With Specialties Details Why [address] Follow up This is your home health agency.  Contact the office directly if you don't hear from them within 24-48 hrs following d/c [address], [address]    Lesa Vega MD Family Medicine Go on 8/4/2022 Go to office for scheduled apt at 2:00 PM. Contact the office directly with any questions or concerns [address] [address] [facility] Neurology Schedule an appointment as soon as possible for a visit in 2 week(s) Contact the office directly to schedule f/u apt within 2-3 weeks of d/c [address] [facility] [address]   [place] [phone]    [facility]  Call As needed Mobile Urgent Care  [phone]    Dale Arzola MD Family Medicine   [address] [place] [address] [phone]. [phone]                  Total time in minutes spent coordinating this discharge (includes going over instructions, follow-up, prescriptions, and preparing report for sign off to her PCP) :  35 minutes    Signed:  Sammy Lucas MD

## 2022-07-27 NOTE — PROCEDURES
[address]  EEG    Name:  Eliezer Kraft  MR#:  [id number]  :  1932  ACCOUNT #:  [de-identified]  DATE OF SERVICE:  2022    CLINICAL INDICATION:  The patient is with altered mental status and sudden confusion and possible seizures. EEG to rule out seizures, rule out cortical abnormalities. EEG CLASSIFICATION:  On this patient is normal.    DESCRIPTION OF THE RECORD:  The background of this recording contains a posteriorly located occipital alpha rhythm of 8-9 Hz seen in the posterior head regions that did attenuate some with eye opening in the awake state. During the recording, there were no areas of clear focal slowing, no clear spike or spike and wave discharges seen and no dysrhythmic or electrographic spells of any type seen. The patient did enter brief states of sleep with K complexes and sleep spindles seen in the central head regions. INTERPRETATION:  This is an essentially normal electroencephalogram with the patient awake and asleep showing no clear areas of focal slowing. No clear dysrhythmic activity and no electrographic spells of any type. Clinical correlation recommended. Elan Medina MD      [id number]  D:  2022 22:58  T:  2022 2:06  JOB #:  [id number]  CC:   Tigre Ritter MD

## 2022-07-27 NOTE — PROGRESS NOTES
Bedside shift change report given to Sherman Kay RN by Arti Galloway RN. Report included the following information SBAR, Kardex, ED Summary, MAR, Recent Results, and Cardiac Rhythm NSR .     1515 I have reviewed discharge instructions, follow up appts and medications with the patient and daughter. The patient and daughter verbalized understanding. Endurance catheter removed without difficulty. Patient's son \"Jr\" will be here aroun 4:30 to pick patient up and bringing clothes. Daughter at bedside currently states she has errands to run and cannot take patient home.

## 2022-07-27 NOTE — PROGRESS NOTES
No further CM needs identified. CM notified pt's nurse of d/c. Transition of Care Plan:    RUR: N/A - \"Observation status\"  Disposition: Home with [facility] (RN/PT/OT), increased support/supervision, & follow up apts  Follow up appointments: PCP & specialist as indicated   DME needed: No current DME needs identified for d/c  Transportation at Discharge: Pt's daughter present at bedside to provide transportation at d/c  [place] or means to access home: Pt has access to her home       IM Medicare Letter: N/A - \"Observation status\"  Is patient a BCPI-A Bundle: N/A        Is patient a  and connected with the [place]? N/A              Caregiver Contact: Pt's daughter Yuly Butterfield: [phone])  Discharge Caregiver contacted prior to discharge? Son & daughter present at bedside the day of d/c (7/27/22)  Care Conference needed?: N/A    Update - 2:20 PM: CM met with pt & daughter Pastor Shore at bedside. CM provided overview of the d/c plan; daughter agreeable. CM provided the following resources for LTC support: [employer], [employer], [employer], [employer] [place], [employer] information. Daughter will provide transport at d/c. No additional needs identified. CM will remain accessible for consult if additional needs arise prior to d/c.    Update - 1:00 PM: CM met with pt, son Rohith Smiley), and daughter-in-law at bedside, provided update on status of disposition, and inquired about supplemental support/supervision in the home. Son reported pt has 5 children, however, only 2 of them are accessible to offer support. Son reported him and his sister check on pt x2 a week. Son aware of pt's refusal to utilize SNF intervention; son concerned about pt falling, however, reported he respects her self-determination. Son agreeable to the d/c plan.  Son requested for CM to return within the next 30 minutes to meet with his sister to provide LTC resources; CM to complete request. Pt's AVS is up-to-date & ready to print from CM standpoint. Update - 12:42 PM: CM contacted pt's PCP office ([phone]) & secured PCP follow apt with Dr. Kendall Angelo for 8/4/22 at 2:00 PM; apt information reflected in AVS. [facility]'s information also reflected in AVS for reference. RN informed CM pt's son is present at bedside. CM will follow up with pt & son to provide final overview of the d/c plan. Update - 12:15 PM: [facility] referral accepted by [address] [place] [address]; agency aware of d/c today. Kathy Alfred information reflected in AVS.    Initial note: Chart reviewed, IDR completed. MD reported AMAURI for today pending results of labs. Previous CRISTINA APARICIO inquired if pt was agreeable to pursing SNF intervention 7/26/22; pt declined. Pt confirmed being agreeable to utilizing [facility] intervention. CM met with pt at bedside, introduced role as d/c planner, and provided verbal explanation of observation status. CM delivered MOON notice, provided an opportunity for clarifying questions, and received pt's signature. Copy of MCDONALD to be placed on chart. CM discussed therapy team's recommendations for d/c re: SNF. Pt adamantly declined SNF, reporting she's not \"going to a nursing home. \" CM explained SNF provides short-term rehab intervention with the goal of pt's returning to their home setting upon d/c. Pt continued to declined SNF. Pt reported the only rehab she will go to is [facility]. CM explained the difference between SNF vs IPR and informed pt she will not qualify for [facility] based on her current level of function (inability to tolerate 3 hrs of therapy a day, 5-7 days per week). CM reiterated the importance of pt's safety in the home following d/c & inquired about HH intervention. Pt confirmed being agreeable to using [facility] at d/c; no preference in provider identified. Pt agreeable to CM sending mass referral to [facility] agencies known to be in-network with her insurance. FOC offered, signed copy to be placed on chart.  Pt reported her daughter Dorothy Abrams) is in route to the hospital to visit with her; pt reported daughter will provide transportation at d/c. CM will follow up with pt at bedside upon her daughter's arrival to provide updates on status of [place] referrals. Care Management Interventions  PCP Verified by CM: Yes  Palliative Care Criteria Met (RRAT>21 & CHF Dx)?: No  Mode of Transport at Discharge:  Other (see comment) (Pt's daughter will provide transportation at d/c)  Transition of Care Consult (CM Consult): Discharge Planning, [address]: No  Discharge Durable Medical Equipment: No  Physical Therapy Consult: Yes  Occupational Therapy Consult: Yes  Speech Therapy Consult: Yes  Support Systems: Child(mitzy), Other Family Member(s)  Confirm Follow Up Transport: Family  The Plan for Transition of Care is Related to the Following Treatment Goals : [place]  The Patient and/or Patient Representative was Provided with a Choice of Provider and Agrees with the Discharge Plan?: Yes  Name of the Patient Representative Who was Provided with a Choice of Provider and Agrees with the Discharge Plan: patient Shanti Gutierrez)  Ethel of Choice List was Provided with Basic Dialogue that Supports the Patient's Individualized Plan of Care/Goals, Treatment Preferences and Shares the Quality Data Associated with the Providers?: Yes  [place] Resource Information Provided?: No  Discharge Location  Patient Expects to be Discharged to[de-identified] Home with home health [facility] (RN/PT/OT), increased support/supervision, & f/u apts)    Kobi Moore, [address] [facility], [address] [facility]

## 2022-07-28 LAB — ANA SER QL: NEGATIVE

## 2022-07-28 NOTE — PROGRESS NOTES
CALLED PHARMACY TO VERIFY

Called pharmacy to verify Dilaudid PRN orders, staff said they will let the pharmacist know 
once they come in. Will follow up. Consult  REFERRED BY:  Esther Ayala MD    CHIEF COMPLAINT: New onset of seizures      Subjective:     Horace Simpson is a 80 y.o. right-handed  female we are asked to see for evaluation of new onset of seizures. The patient was brought to the emergency room last evening complaining of confusion and disorientation that started that morning and while being evaluated in the emergency room the patient had a witnessed tonic-clonic seizure with postictal drowsiness. She was given Ativan. She never had a past history of seizures. Her metabolic parameters do not show any major imbalance. Her head CT was normal.  She seems to much back to normal baseline this morning, and has been started on Keppra 500 twice daily. She never had a history of stroke or TIA or unusual head injuries or seizures before. Her medications only include Ultracet for her arthritis in her back, Flexeril for the same, Cozaar and Pravachol and baby aspirin. She does not smoke does not drink and lives independently and walks with a walker. Her toxic screen is negative and her EEG this morning was normal.  She says she is back to baseline. Patient refused her MRI scan, and her age of 80, there is no reason to sedate her and put her through that, she is stable neurologically and has an unremarkable exam, no more seizures on Keppra 500 twice daily, so we repeated the CT scan and if that was stable she can go home on her current medication.     Past Medical History:   Diagnosis Date    Chronic pain     mid back and left side    Hypercholesterolemia 11/7/2011    Hypertension 11/6/2014      Past Surgical History:   Procedure Laterality Date    HX OTHER SURGICAL      mole removal on right arm     Family History   Problem Relation Age of Onset    Cancer Mother         colon    Heart Attack Father     Kidney Disease Sister         on dialysis      Social History     Tobacco Use    Smoking status: Never    Smokeless tobacco: Never   Substance Use Topics    Alcohol use: No       No current facility-administered medications for this encounter. Current Outpatient Medications:     levETIRAcetam (Keppra) 500 mg tablet, Take 1 Tablet by mouth two (2) times a day., Disp: 60 Tablet, Rfl: 0    [START ON 7/28/2022] losartan (COZAAR) 100 mg tablet, Take 1 Tablet by mouth in the morning., Disp: 30 Tablet, Rfl: 0    traMADol-acetaminophen (ULTRACET) 37.5-325 mg per tablet, Take 1 Tab by mouth every eight (8) hours as needed for Pain. Max Daily Amount: 3 Tabs., Disp: 30 Tab, Rfl: 0    cyclobenzaprine (FLEXERIL) 5 mg tablet, Take 1 Tab by mouth nightly. Indications: MUSCLE SPASM, Disp: 20 Tab, Rfl: 1    pravastatin (PRAVACHOL) 20 mg tablet, Take 1 tablet by mouth nightly. For cholesterol, Disp: 90 tablet, Rfl: 3    aspirin delayed-release 81 mg tablet, Take 1 Tab by mouth daily. , Disp: , Rfl:         Allergies   Allergen Reactions    Lisinopril Cough      MRI Results (most recent):  No results found for this or any previous visit. No results found for this or any previous visit. Review of Systems:  A comprehensive review of systems was negative except for: Constitutional: positive for fatigue and malaise  Musculoskeletal: positive for myalgias, arthralgias, stiff joints, back pain, and muscle weakness  Neurological: positive for seizures, memory problems, coordination problems, gait problems, and weakness  Behvioral/Psych: positive for anxiety   Vitals:    07/26/22 2342 07/27/22 0429 07/27/22 0928 07/27/22 1220   BP: (!) 163/74 (!) 197/79 (!) 193/76 (!) 174/65   Pulse: (!) 58 66 65 66   Resp: 18 18 18 18   Temp: 97 °F (36.1 °C) 97.4 °F (36.3 °C) 97.6 °F (36.4 °C) 97.4 °F (36.3 °C)   SpO2: 98% 98% 98% 97%     Objective:     I      NEUROLOGICAL EXAM:    Appearance: The patient is poorly developed and nourished, provides a fair history and is in no acute distress.    Mental Status: Oriented to time except the day of the month, place and person, and the president, cognitive function is relatively normal for age and speech is fluent and no aphasia or dysarthria. Mood and affect appropriate. Cranial Nerves:   Intact visual fields. Fundi are benign, disc are flat, no lesions seen on funduscopy. MARIAM, EOM's full, no nystagmus, no ptosis. Facial sensation is normal. Corneal reflexes are not tested. Facial movement is symmetric. Hearing is abnormal bilaterally. Palate is midline with normal sternocleidomastoid and trapezius muscles are normal. Tongue is midline. Neck without meningismus or bruits  Temporal arteries are not tender or enlarged  TMJ areas are not tender on palpation   Motor:  4/5 strength in upper and lower proximal and distal muscles. Normal bulk and tone. No fasciculations. Rapid alternating movement is symmetric and intact bilaterally   Reflexes:   Deep tendon reflexes 1+/4 and symmetrical.  No babinski or clonus present   Sensory:   Normal to touch, pinprick and vibration and temperature mildly decreased in both feet. DSS is intact   Gait:  Not testable because patient on stretcher in Doppler lab   Tremor:   No tremor noted. Cerebellar:  Not testable abnormal cerebellar signs present on Romberg and tandem testing and finger-nose-finger exam.   Neurovascular:  Normal heart sounds and regular rhythm, peripheral pulses decreased, and no carotid bruits. Assessment:       ICD-10-CM ICD-9-CM    1. Altered mental status, unspecified altered mental status type  R41.82 780.97       2. New onset seizure ([address]  R56.9 780.39       3.  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 MRI scan is pending. Metabolic studies are unremarkable and tox screen was negative. No clear structural cause for her seizures yet but will await the MRI. Just continue the [address] for now and follow the levels, and we will adjusted pending the levels. Patient seems more back to normal.  Patient refused her MRI scan, and her age of 80, there is no reason to sedate her and put her through that, she is stable neurologically and has an unremarkable exam, no more seizures on Keppra 500 twice daily, so we repeated the CT scan and if that was stable she can go home on her current medication. Discussed in detail with the hospitalist, they agree, we will follow as needed, call if we can help  Continue excellent medical    We will follow closely.     Signed By: Jacqueline Aleman MD     July 27, 2022       CC: Anselmo Ulloa MD  FAX: [phone]

## 2022-07-29 LAB
LEVETIRACETAM SERPL-MCNC: 18.3 UG/ML (ref 10–40)
LEVETIRACETAM SERPL-MCNC: 23.6 UG/ML (ref 10–40)

## 2022-07-30 LAB
BACTERIA SPEC CULT: NORMAL
SERVICE CMNT-IMP: NORMAL

## 2022-08-09 LAB
INTERPRETATION, NEU2LT: NORMAL
METHOD, NEU3LT: NORMAL
NEURONAL CELL AB, NEU1LT: 24 UNITS (ref 0–54)

## 2022-08-10 ENCOUNTER — HOSPITAL ENCOUNTER (OUTPATIENT)
Dept: LAB | Age: 87
Discharge: HOME | End: 2022-08-10

## 2022-08-10 ENCOUNTER — OFFICE VISIT (OUTPATIENT)
Dept: SURGERY | Age: 87
End: 2022-08-10
Payer: MEDICARE

## 2022-08-10 VITALS
WEIGHT: 98 LBS | HEIGHT: 56 IN | SYSTOLIC BLOOD PRESSURE: 126 MMHG | OXYGEN SATURATION: 97 % | HEART RATE: 88 BPM | RESPIRATION RATE: 16 BRPM | DIASTOLIC BLOOD PRESSURE: 64 MMHG | TEMPERATURE: 98.2 F | BODY MASS INDEX: 22.04 KG/M2

## 2022-08-10 DIAGNOSIS — N63.0 BREAST NODULE: Primary | ICD-10-CM

## 2022-08-10 DIAGNOSIS — R59.0 AXILLARY LYMPHADENOPATHY: ICD-10-CM

## 2022-08-10 PROCEDURE — 76641 ULTRASOUND BREAST COMPLETE: CPT | Performed by: SURGERY

## 2022-08-10 PROCEDURE — 19083 BX BREAST 1ST LESION US IMAG: CPT | Performed by: SURGERY

## 2022-08-10 PROCEDURE — 38505 NEEDLE BIOPSY LYMPH NODES: CPT | Performed by: SURGERY

## 2022-08-10 PROCEDURE — 1101F PT FALLS ASSESS-DOCD LE1/YR: CPT | Performed by: SURGERY

## 2022-08-10 PROCEDURE — 1123F ACP DISCUSS/DSCN MKR DOCD: CPT | Performed by: SURGERY

## 2022-08-10 PROCEDURE — 99205 OFFICE O/P NEW HI 60 MIN: CPT | Performed by: SURGERY

## 2022-08-10 PROCEDURE — 76942 ECHO GUIDE FOR BIOPSY: CPT | Performed by: SURGERY

## 2022-08-10 PROCEDURE — G8420 CALC BMI NORM PARAMETERS: HCPCS | Performed by: SURGERY

## 2022-08-10 PROCEDURE — G8536 NO DOC ELDER MAL SCRN: HCPCS | Performed by: SURGERY

## 2022-08-10 PROCEDURE — 1090F PRES/ABSN URINE INCON ASSESS: CPT | Performed by: SURGERY

## 2022-08-10 PROCEDURE — G8427 DOCREV CUR MEDS BY ELIG CLIN: HCPCS | Performed by: SURGERY

## 2022-08-10 PROCEDURE — G8432 DEP SCR NOT DOC, RNG: HCPCS | Performed by: SURGERY

## 2022-08-10 NOTE — PROGRESS NOTES
Limited Ultrasound of the breast    Procedure:  Ultrasound of left breast and axilla  Indication:  left breast mass at 6 o'clock and axillary lymphadenopathy  Surgeon:  Alexa Lyles MD FACS  Procedure:  Utilizing a [employer] S7 high frequency linear array transducer the left axilla and breast was scanned and images obtained with special attention to the 6 o'clock position  Findings:  irregular hypoechoic 4.0 x 2.2 x 3.2 cm density involving dermis at 6 o'clock 3 centimeters from the nipple and several left axillary lymph nodes that appear abnormal with the largest 0.9 x 0.7 x 1.4 cm  Impression:  highly suspicious for malignancy BIRADS: 5  Recommend:  Biopsy for histology    Alexa Lyels MD FACS

## 2022-08-10 NOTE — PROGRESS NOTES
Procedure Note    Pre Procedure Diagnosis:  left breast mass 0600  Post Procedure Diagnosis:  left breast mass 0600  Procedure:  1. Ultrasound guided vacuum assisted core biopsy of left breast 0600                      2.  Ultrasound guided marker/clip placement left breast  Surgeon:   Chelly Whitten MD FACS  Local 7 ml 1% lidocaine   EBL minimal  SPECIMEN:   left breast mass    Procedure:  After informed consent and time out, the left breast was prepped with chorhexidine. Using ultrasound guidance, local anesthesia was injected into the dermis and subcutaneous tissues adjacent to the hypoechoic mass noted on ultrasound. A small stab incision was made and using an 8 gauge Mammotome vacuum assisted core biopsy device and ultrasound guidance 3 cores were taken from a lateral to medial approach. Pictures were taken to document this procedure. Next, again using ultrasound guidance a HydroMark 8 clip was placed in the biopsy cavity. Pressure was held and then steristrips and a sterile dressing was applied. The patient tolerated the procedure well.       Signed  Chelly Whitten MD FACS

## 2022-08-10 NOTE — PROGRESS NOTES
HISTORY OF PRESENT ILLNESS  Karel Dominguez is a 80 y.o. female who comes in for consultation by Elver Lo MD for a breast mass  HPI  She has noted a lump in her left breast since at least Jan 2022. She denies trauma, skin changes, nipple changes or drainage. It is not painful. She has never had a mammogram and denies other breast problems in the past.   She had menarche at 15, first of five pregnancies at 25, menopause at 46 and did not take HRT. She denies family hx breast/ovarian/prostate/pancreatic cancer but her mother had colon cancer. Past Medical History:   Diagnosis Date    Chronic pain     mid back and left side    Hypercholesterolemia 11/7/2011    Hypertension 11/6/2014     Past Surgical History:   Procedure Laterality Date    HX ORTHOPAEDIC      HX OTHER SURGICAL      mole removal on right arm    HX WRIST FRACTURE TX Left 2017     Family History   Problem Relation Age of Onset    Cancer Mother         colon    Heart Attack Father     Kidney Disease Sister         on dialysis     Current Outpatient Medications   Medication Sig    levETIRAcetam (Keppra) 500 mg tablet Take 1 Tablet by mouth two (2) times a day. losartan (COZAAR) 100 mg tablet Take 1 Tablet by mouth in the morning. (Patient not taking: Reported on 8/10/2022)    traMADol-acetaminophen (ULTRACET) 37.5-325 mg per tablet Take 1 Tab by mouth every eight (8) hours as needed for Pain. Max Daily Amount: 3 Tabs. (Patient not taking: Reported on 8/10/2022)    cyclobenzaprine (FLEXERIL) 5 mg tablet Take 1 Tab by mouth nightly. Indications: MUSCLE SPASM (Patient not taking: Reported on 8/10/2022)    pravastatin (PRAVACHOL) 20 mg tablet Take 1 tablet by mouth nightly. For cholesterol (Patient not taking: Reported on 8/10/2022)    aspirin delayed-release 81 mg tablet Take 1 Tab by mouth daily. (Patient not taking: Reported on 8/10/2022)     No current facility-administered medications for this visit.      Social History     Tobacco Use    Smoking status: Never    Smokeless tobacco: Never   Substance Use Topics    Alcohol use: No    Drug use: No     .  Review of Systems   Constitutional:  Negative for chills, diaphoresis, fever and weight loss. HENT:  Negative for sore throat. Eyes:  Negative for blurred vision and discharge. Respiratory:  Negative for cough, shortness of breath and wheezing. Cardiovascular:  Negative for chest pain, palpitations, orthopnea, claudication and leg swelling. Gastrointestinal:  Negative for abdominal pain, constipation, diarrhea, heartburn, melena, nausea and vomiting. Genitourinary:  Negative for dysuria, flank pain, frequency and hematuria. Musculoskeletal:  Negative for back pain, joint pain, myalgias and neck pain. Skin:  Negative for rash. Neurological:  Positive for seizures (new onset 7/2022). Negative for dizziness, speech change, focal weakness, loss of consciousness, weakness and headaches. Endo/Heme/Allergies:  Bruises/bleeds easily. Psychiatric/Behavioral:  Negative for depression and memory loss. Visit Vitals  /64 (BP 1 Location: Left upper arm, BP Patient Position: Sitting, BP Cuff Size: Small adult)   Pulse 88   Temp 98.2 °F (36.8 °C) (Temporal)   Resp 16   Ht 4' 8\" (1.422 m)   Wt 44.5 kg (98 lb)   SpO2 97%   BMI 21.97 kg/m²       Physical Exam  Vitals and nursing note reviewed. Exam conducted with a chaperone present. Constitutional:       General: She is not in acute distress. Appearance: She is well-developed. She is not diaphoretic. HENT:      Head: Normocephalic and atraumatic. Nose: Nose normal.      Mouth/Throat:      Pharynx: Oropharynx is clear. No oropharyngeal exudate. Eyes:      General: No scleral icterus. Conjunctiva/sclera: Conjunctivae normal.      Pupils: Pupils are equal, round, and reactive to light. Neck:      Thyroid: No thyromegaly. Vascular: No JVD. Trachea: No tracheal deviation.    Cardiovascular:      Rate and Rhythm: Normal rate and regular rhythm. Heart sounds: No murmur heard. No friction rub. No gallop. Pulmonary:      Effort: Pulmonary effort is normal. No respiratory distress. Breath sounds: Normal breath sounds. No wheezing or rales. Chest:   Breasts:     Breasts are asymmetrical.      Right: No inverted nipple, mass, nipple discharge, skin change, tenderness, axillary adenopathy or supraclavicular adenopathy. Left: Mass (5 cm mass at 0600 with dermal involvement with ulcerated), skin change and axillary adenopathy (mobile 2 cm node) present. No inverted nipple, nipple discharge, tenderness or supraclavicular adenopathy. Abdominal:      General: Abdomen is flat. Bowel sounds are normal. There is no distension. Palpations: Abdomen is soft. There is no mass. Tenderness: There is no abdominal tenderness. There is no guarding or rebound. Musculoskeletal:         General: Normal range of motion. Cervical back: Normal range of motion and neck supple. Lymphadenopathy:      Cervical: No cervical adenopathy. Upper Body:      Right upper body: No supraclavicular or axillary adenopathy. Left upper body: Axillary adenopathy (mobile 2 cm node) present. No supraclavicular adenopathy. Skin:     General: Skin is warm and dry. Coloration: Skin is not pale. Findings: No erythema or rash. Neurological:      Mental Status: She is alert and oriented to person, place, and time. Cranial Nerves: No cranial nerve deficit. Psychiatric:         Behavior: Behavior normal.         Thought Content: Thought content normal.         Judgment: Judgment normal.       ASSESSMENT and PLAN   Left breast mass and axillary node. I explained to her about the anatomy and pathophysiology of the process and high likelihood for malignancy. We discussed options for observation and further work up with biopsy.    She and her daughter are somewhat reluctant to have aggressive therapy but are willing to undergo biopsy. We did US and core bx of the lesion and enlarged lymph node today. Hx seizures. Recent 7/25/2022. 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 Will call with biopsy results  Refer to med onc.    She desires Dr Nadya Lott MD FACS

## 2022-08-10 NOTE — PROGRESS NOTES
[facility]  OFFICE PROCEDURE PROGRESS NOTE        Chart reviewed for the following:   Tom SHERMAN, have reviewed the History, Physical and updated the Allergic reactions for [address] performed immediately prior to start of procedure:   Tom SHERMAN, have performed the following reviews on Leonardo English prior to the start of the procedure:            * Patient was identified by name and date of birth   * Agreement on procedure being performed was verified  * Risks and Benefits explained to the patient  * Procedure site verified and marked as necessary  * Patient was positioned for comfort  * Consent was signed and verified     Time: 6698      Date of procedure: 8/10/2022    Procedure performed by:  Jet Daugherty MD    Provider assisted by: Tom Herrera LPN    Patient assisted by: daughter    How tolerated by patient: tolerated the procedure well with no complications    Post Procedural Pain Scale: 0 - No Hurt    Comments: Specimen sent to pathology.

## 2022-08-10 NOTE — PROGRESS NOTES
Procedure Note     Pre Procedure Diagnosis:  left breast mass 0600 and axillary lymphadenopathy  Post Procedure Diagnosis:  left breast mass 0600 and axillary lymphadenopathy  Procedure:  1. Ultrasound guided spring loaded core biopsy of left axillary node                      Surgeon:   Eriberto Lewis MD FACS  Local 3 ml 1% lidocaine  EBL minimal  SPECIMEN:   left axillary mass     Procedure:  After informed consent and time out, the left axilla was prepped with chorhexidine. Using ultrasound guidance, local anesthesia was injected into the dermis and subcutaneous tissues adjacent to the abnormal lymph node noted on ultrasound. A small stab incision was made and using an 14 gauge Achieve spring loaded core biopsy device and ultrasound guidance 3 cores were taken from a lateral to medial approach. Pictures were taken to document this procedure. Pressure was held and then steristrips and a sterile dressing was applied. The patient tolerated the procedure well.         Signed  Eriberto Lewis MD FACS

## 2022-08-10 NOTE — PROGRESS NOTES
Identified pt with two pt identifiers(name and ). Reviewed record in preparation for visit and have obtained necessary documentation. All patient medications has been reviewed. Chief Complaint   Patient presents with    Breast Problem     Seen at the request of Dr Luciano Lovilia for eval of left breast lump       Health Maintenance Due   Topic    Depression Screen     COVID-19 Vaccine (1)    Pneumococcal 65+ years (1 - PCV)    DTaP/Tdap/Td series (1 - Tdap)    Shingrix Vaccine Age 50> (1 of 2)    Lipid Screen     Medicare Yearly Exam        Vitals:    08/10/22 1139   BP: 126/64   Pulse: 88   Resp: 16   Temp: 98.2 °F (36.8 °C)   TempSrc: Temporal   SpO2: 97%   Weight: 44.5 kg (98 lb)   Height: 4' 8\" (1.422 m)   PainSc:   0 - No pain       4. Have you been to the ER, urgent care clinic since your last visit? Hospitalized since your last visit? ED [facility] 22 for seizure     5. Have you seen or consulted any other health care providers outside of the [address] [place] [address] since your last visit? Include any pap smears or colon screening. No      Patient is accompanied by daughter I have received verbal consent from Jim Iqbal to discuss any/all medical information while they are present in the room.

## 2022-08-16 ENCOUNTER — OFFICE VISIT (OUTPATIENT)
Dept: ONCOLOGY | Age: 87
End: 2022-08-16
Payer: MEDICARE

## 2022-08-16 ENCOUNTER — DOCUMENTATION ONLY (OUTPATIENT)
Dept: ONCOLOGY | Age: 87
End: 2022-08-16

## 2022-08-16 VITALS
SYSTOLIC BLOOD PRESSURE: 149 MMHG | OXYGEN SATURATION: 97 % | DIASTOLIC BLOOD PRESSURE: 67 MMHG | BODY MASS INDEX: 22 KG/M2 | WEIGHT: 97.8 LBS | RESPIRATION RATE: 16 BRPM | TEMPERATURE: 98.6 F | HEART RATE: 71 BPM | HEIGHT: 56 IN

## 2022-08-16 DIAGNOSIS — C77.3 BREAST CANCER METASTASIZED TO AXILLARY LYMPH NODE, LEFT (HCC): Primary | ICD-10-CM

## 2022-08-16 DIAGNOSIS — C50.912 BREAST CANCER METASTASIZED TO AXILLARY LYMPH NODE, LEFT (HCC): Primary | ICD-10-CM

## 2022-08-16 PROCEDURE — 1090F PRES/ABSN URINE INCON ASSESS: CPT | Performed by: STUDENT IN AN ORGANIZED HEALTH CARE EDUCATION/TRAINING PROGRAM

## 2022-08-16 PROCEDURE — G8510 SCR DEP NEG, NO PLAN REQD: HCPCS | Performed by: STUDENT IN AN ORGANIZED HEALTH CARE EDUCATION/TRAINING PROGRAM

## 2022-08-16 PROCEDURE — G8536 NO DOC ELDER MAL SCRN: HCPCS | Performed by: STUDENT IN AN ORGANIZED HEALTH CARE EDUCATION/TRAINING PROGRAM

## 2022-08-16 PROCEDURE — 1123F ACP DISCUSS/DSCN MKR DOCD: CPT | Performed by: STUDENT IN AN ORGANIZED HEALTH CARE EDUCATION/TRAINING PROGRAM

## 2022-08-16 PROCEDURE — G8427 DOCREV CUR MEDS BY ELIG CLIN: HCPCS | Performed by: STUDENT IN AN ORGANIZED HEALTH CARE EDUCATION/TRAINING PROGRAM

## 2022-08-16 PROCEDURE — 99205 OFFICE O/P NEW HI 60 MIN: CPT | Performed by: STUDENT IN AN ORGANIZED HEALTH CARE EDUCATION/TRAINING PROGRAM

## 2022-08-16 PROCEDURE — 1101F PT FALLS ASSESS-DOCD LE1/YR: CPT | Performed by: STUDENT IN AN ORGANIZED HEALTH CARE EDUCATION/TRAINING PROGRAM

## 2022-08-16 PROCEDURE — G8420 CALC BMI NORM PARAMETERS: HCPCS | Performed by: STUDENT IN AN ORGANIZED HEALTH CARE EDUCATION/TRAINING PROGRAM

## 2022-08-16 RX ORDER — CHOLECALCIFEROL (VITAMIN D3) 125 MCG
CAPSULE ORAL
COMMUNITY

## 2022-08-16 RX ORDER — LANOLIN ALCOHOL/MO/W.PET/CERES
1000 CREAM (GRAM) TOPICAL DAILY
COMMUNITY

## 2022-08-16 NOTE — PROGRESS NOTES
Gisela Arredondo is a 80 y.o. female new pt here today for breast nodule axillary lymphadenopathy. Pt reports pain in her leg and where she had her biopsy done. Pt denies any other symptoms wants to discuss labs and biopsy that was done. Chief Complaint   Patient presents with    New Patient     Breast nodule axillary lymphadenopathy.

## 2022-08-16 NOTE — PROGRESS NOTES
[address]  Oncology Social Work  Encounter     Patient Name:  Jim Iqbal    Medical History: dx breast cancer     Advance Directives:    Narrative: PT newly diagnosed with locally advanced breast cancer who does not want any treatment and elects hospice. Pt lives alone but family checks on her almost daily. SW completed DDNR and will send referral to [facility] (she used [facility]).       Barriers to Care:     Plan:    Referral/Handouts:   Hospice referral

## 2022-08-19 ENCOUNTER — HOSPICE ADMISSION (OUTPATIENT)
Dept: HOSPICE | Facility: HOSPICE | Age: 87
End: 2022-08-19

## 2022-08-29 ENCOUNTER — TELEPHONE (OUTPATIENT)
Dept: ONCOLOGY | Age: 87
End: 2022-08-29

## 2022-08-29 NOTE — TELEPHONE ENCOUNTER
0 Pt IDx2  Had lengthy discussion with daughter Kathie Koo whom describes being overwhelmed with inability of her family members to commit to helping while her mother is on hospice care. Kathie Koo understands as disease progresses and her mother can no longer safely care for herself that staying home with hospice care requires caregivers around the clock. Kathie Koo questioned whether she should keep her mother home or put her in a nursing home. Explained that there are many variables to that situation some being financial etc.  Kathie Koo feels that after the family meeting on Wednesday she will know more about the family assistance she will receive and if she wants to keep her mother home. She does not feel they can afford to pay caregivers to be there. Thanked us for the call and she will keep us updated with decisions made.

## 2022-08-29 NOTE — TELEPHONE ENCOUNTER
Patient's daughter Verena Ramirez called & left a vm. Verena Ramirez has question about hospice. Please call Verena Ramirez @ [phone].

## 2022-08-29 NOTE — TELEPHONE ENCOUNTER
[address]  Oncology Social Work  Encounter     Patient Name:  Karel Dominguez    Medical History: Pt was referred to hospice on 8/16 but has not had a meeting yet- ? Unsure why? Pt very rattled no one in family is going to help. Pt dtr called anxious about pt needing additional care and she is concerned she can't do it all and she can't pay for 24/7 care. SW provided # for applying for Medicaid LTC (takes 45 + days to get approved,. Hospice will provide respite care 5 days every 90 days. SW encouraged pt to ask these ? to hospice on Wednesday at intake.       Advance Directives:    Narrative:     Barriers to Care:     Plan:    Referral/Handouts:

## 2022-09-21 ENCOUNTER — DOCUMENTATION ONLY (OUTPATIENT)
Dept: SURGERY | Age: 87
End: 2022-09-21

## 2022-09-21 ENCOUNTER — TELEPHONE (OUTPATIENT)
Dept: SURGERY | Age: 87
End: 2022-09-21

## 2022-09-21 NOTE — TELEPHONE ENCOUNTER
Faxed Medical records to [facility]. Per [facility] record request.

## 2022-09-27 DIAGNOSIS — C50.912 BREAST CANCER METASTASIZED TO AXILLARY LYMPH NODE, LEFT (HCC): Primary | ICD-10-CM

## 2022-09-27 DIAGNOSIS — C77.3 BREAST CANCER METASTASIZED TO AXILLARY LYMPH NODE, LEFT (HCC): Primary | ICD-10-CM

## 2022-09-28 ENCOUNTER — TELEPHONE (OUTPATIENT)
Dept: ONCOLOGY | Age: 87
End: 2022-09-28

## 2022-09-28 DIAGNOSIS — C50.912 BREAST CANCER METASTASIZED TO AXILLARY LYMPH NODE, LEFT (HCC): Primary | ICD-10-CM

## 2022-09-28 DIAGNOSIS — C77.3 BREAST CANCER METASTASIZED TO AXILLARY LYMPH NODE, LEFT (HCC): Primary | ICD-10-CM

## 2022-09-28 NOTE — TELEPHONE ENCOUNTER
[facility] for [facility] new [facility].   The old one  as pt wanted to finish home health first.

## 2022-11-27 ENCOUNTER — APPOINTMENT (OUTPATIENT)
Dept: CT IMAGING | Age: 87
DRG: 101 | End: 2022-11-27
Attending: EMERGENCY MEDICINE
Payer: MEDICARE

## 2022-11-27 ENCOUNTER — HOSPITAL ENCOUNTER (INPATIENT)
Age: 87
LOS: 1 days | Discharge: HOME | DRG: 101 | End: 2022-11-28
Attending: EMERGENCY MEDICINE | Admitting: FAMILY MEDICINE
Payer: MEDICARE

## 2022-11-27 DIAGNOSIS — R47.01 APHASIA: ICD-10-CM

## 2022-11-27 DIAGNOSIS — R56.9 SEIZURE (HCC): Primary | ICD-10-CM

## 2022-11-27 LAB
ALBUMIN SERPL-MCNC: 3.4 G/DL (ref 3.5–5)
ALBUMIN/GLOB SERPL: 0.9 (ref 1.1–2.2)
ALP SERPL-CCNC: 73 U/L (ref 45–117)
ALT SERPL-CCNC: 14 U/L (ref 12–78)
ANION GAP SERPL CALC-SCNC: 4 MMOL/L (ref 5–15)
APPEARANCE UR: CLEAR
AST SERPL-CCNC: 10 U/L (ref 15–37)
ATRIAL RATE: 78 BPM
BACTERIA URNS QL MICRO: NEGATIVE /HPF
BASOPHILS # BLD: 0 K/UL (ref 0–0.1)
BASOPHILS NFR BLD: 1 % (ref 0–1)
BILIRUB SERPL-MCNC: 0.3 MG/DL (ref 0.2–1)
BILIRUB UR QL: NEGATIVE
BUN SERPL-MCNC: 13 MG/DL (ref 6–20)
BUN/CREAT SERPL: 15 (ref 12–20)
CALCIUM SERPL-MCNC: 9.3 MG/DL (ref 8.5–10.1)
CALCULATED P AXIS, ECG09: 40 DEGREES
CALCULATED R AXIS, ECG10: -18 DEGREES
CALCULATED T AXIS, ECG11: 125 DEGREES
CHLORIDE SERPL-SCNC: 104 MMOL/L (ref 97–108)
CO2 SERPL-SCNC: 29 MMOL/L (ref 21–32)
COLOR UR: NORMAL
COMMENT, HOLDF: NORMAL
CREAT SERPL-MCNC: 0.85 MG/DL (ref 0.55–1.02)
DIAGNOSIS, 93000: NORMAL
DIFFERENTIAL METHOD BLD: NORMAL
EOSINOPHIL # BLD: 0.1 K/UL (ref 0–0.4)
EOSINOPHIL NFR BLD: 1 % (ref 0–7)
EPITH CASTS URNS QL MICRO: NORMAL /LPF
ERYTHROCYTE [DISTWIDTH] IN BLOOD BY AUTOMATED COUNT: 12.1 % (ref 11.5–14.5)
GLOBULIN SER CALC-MCNC: 3.7 G/DL (ref 2–4)
GLUCOSE SERPL-MCNC: 105 MG/DL (ref 65–100)
GLUCOSE UR STRIP.AUTO-MCNC: NEGATIVE MG/DL
HCT VFR BLD AUTO: 36.9 % (ref 35–47)
HGB BLD-MCNC: 12.2 G/DL (ref 11.5–16)
HGB UR QL STRIP: NEGATIVE
HYALINE CASTS URNS QL MICRO: NORMAL /LPF (ref 0–5)
IMM GRANULOCYTES # BLD AUTO: 0 K/UL (ref 0–0.04)
IMM GRANULOCYTES NFR BLD AUTO: 0 % (ref 0–0.5)
KETONES UR QL STRIP.AUTO: NEGATIVE MG/DL
LEUKOCYTE ESTERASE UR QL STRIP.AUTO: NEGATIVE
LYMPHOCYTES # BLD: 1 K/UL (ref 0.8–3.5)
LYMPHOCYTES NFR BLD: 20 % (ref 12–49)
MCH RBC QN AUTO: 32.1 PG (ref 26–34)
MCHC RBC AUTO-ENTMCNC: 33.1 G/DL (ref 30–36.5)
MCV RBC AUTO: 97.1 FL (ref 80–99)
MONOCYTES # BLD: 0.5 K/UL (ref 0–1)
MONOCYTES NFR BLD: 9 % (ref 5–13)
NEUTS SEG # BLD: 3.3 K/UL (ref 1.8–8)
NEUTS SEG NFR BLD: 69 % (ref 32–75)
NITRITE UR QL STRIP.AUTO: NEGATIVE
NRBC # BLD: 0 K/UL (ref 0–0.01)
NRBC BLD-RTO: 0 PER 100 WBC
P-R INTERVAL, ECG05: 172 MS
PH UR STRIP: 7.5 (ref 5–8)
PLATELET # BLD AUTO: 209 K/UL (ref 150–400)
PMV BLD AUTO: 10.9 FL (ref 8.9–12.9)
POTASSIUM SERPL-SCNC: 3.8 MMOL/L (ref 3.5–5.1)
PROT SERPL-MCNC: 7.1 G/DL (ref 6.4–8.2)
PROT UR STRIP-MCNC: NEGATIVE MG/DL
Q-T INTERVAL, ECG07: 406 MS
QRS DURATION, ECG06: 92 MS
QTC CALCULATION (BEZET), ECG08: 462 MS
RBC # BLD AUTO: 3.8 M/UL (ref 3.8–5.2)
RBC #/AREA URNS HPF: NORMAL /HPF (ref 0–5)
SAMPLES BEING HELD,HOLD: NORMAL
SODIUM SERPL-SCNC: 137 MMOL/L (ref 136–145)
SP GR UR REFRACTOMETRY: 1.02 (ref 1–1.03)
UROBILINOGEN UR QL STRIP.AUTO: 0.2 EU/DL (ref 0.2–1)
VENTRICULAR RATE, ECG03: 78 BPM
WBC # BLD AUTO: 4.9 K/UL (ref 3.6–11)
WBC URNS QL MICRO: NORMAL /HPF (ref 0–4)

## 2022-11-27 PROCEDURE — 74011250636 HC RX REV CODE- 250/636: Performed by: EMERGENCY MEDICINE

## 2022-11-27 PROCEDURE — 36415 COLL VENOUS BLD VENIPUNCTURE: CPT

## 2022-11-27 PROCEDURE — 93005 ELECTROCARDIOGRAM TRACING: CPT

## 2022-11-27 PROCEDURE — 70450 CT HEAD/BRAIN W/O DYE: CPT

## 2022-11-27 PROCEDURE — APPNB45 APP NON BILLABLE 31-45 MINUTES: Performed by: NURSE PRACTITIONER

## 2022-11-27 PROCEDURE — 74011000636 HC RX REV CODE- 636: Performed by: RADIOLOGY

## 2022-11-27 PROCEDURE — 80061 LIPID PANEL: CPT

## 2022-11-27 PROCEDURE — 65270000046 HC RM TELEMETRY

## 2022-11-27 PROCEDURE — 4A03X5D MEASUREMENT OF ARTERIAL FLOW, INTRACRANIAL, EXTERNAL APPROACH: ICD-10-PCS | Performed by: RADIOLOGY

## 2022-11-27 PROCEDURE — 99285 EMERGENCY DEPT VISIT HI MDM: CPT

## 2022-11-27 PROCEDURE — 70496 CT ANGIOGRAPHY HEAD: CPT

## 2022-11-27 PROCEDURE — 96374 THER/PROPH/DIAG INJ IV PUSH: CPT

## 2022-11-27 PROCEDURE — 81001 URINALYSIS AUTO W/SCOPE: CPT

## 2022-11-27 PROCEDURE — 80053 COMPREHEN METABOLIC PANEL: CPT

## 2022-11-27 PROCEDURE — 85025 COMPLETE CBC W/AUTO DIFF WBC: CPT

## 2022-11-27 PROCEDURE — 74011250636 HC RX REV CODE- 250/636

## 2022-11-27 PROCEDURE — 0042T CT CODE NEURO PERF W CBF: CPT

## 2022-11-27 PROCEDURE — 80177 DRUG SCRN QUAN LEVETIRACETAM: CPT

## 2022-11-27 RX ORDER — MELATONIN
1000 DAILY
Status: DISCONTINUED | OUTPATIENT
Start: 2022-11-28 | End: 2022-11-28 | Stop reason: HOSPADM

## 2022-11-27 RX ORDER — POLYETHYLENE GLYCOL 3350 17 G/17G
17 POWDER, FOR SOLUTION ORAL DAILY PRN
Status: DISCONTINUED | OUTPATIENT
Start: 2022-11-27 | End: 2022-11-28 | Stop reason: HOSPADM

## 2022-11-27 RX ORDER — SODIUM CHLORIDE 0.9 % (FLUSH) 0.9 %
5-40 SYRINGE (ML) INJECTION EVERY 8 HOURS
Status: DISCONTINUED | OUTPATIENT
Start: 2022-11-27 | End: 2022-11-28 | Stop reason: HOSPADM

## 2022-11-27 RX ORDER — ONDANSETRON 2 MG/ML
4 INJECTION INTRAMUSCULAR; INTRAVENOUS
Status: DISCONTINUED | OUTPATIENT
Start: 2022-11-27 | End: 2022-11-28 | Stop reason: HOSPADM

## 2022-11-27 RX ORDER — LANOLIN ALCOHOL/MO/W.PET/CERES
1000 CREAM (GRAM) TOPICAL DAILY
Status: DISCONTINUED | OUTPATIENT
Start: 2022-11-28 | End: 2022-11-28 | Stop reason: HOSPADM

## 2022-11-27 RX ORDER — LORAZEPAM 2 MG/ML
INJECTION, SOLUTION INTRAMUSCULAR; INTRAVENOUS
Status: COMPLETED
Start: 2022-11-27 | End: 2022-11-27

## 2022-11-27 RX ORDER — SODIUM CHLORIDE 0.9 % (FLUSH) 0.9 %
5-40 SYRINGE (ML) INJECTION AS NEEDED
Status: DISCONTINUED | OUTPATIENT
Start: 2022-11-27 | End: 2022-11-28 | Stop reason: HOSPADM

## 2022-11-27 RX ORDER — ACETAMINOPHEN 650 MG/1
650 SUPPOSITORY RECTAL
Status: DISCONTINUED | OUTPATIENT
Start: 2022-11-27 | End: 2022-11-28 | Stop reason: HOSPADM

## 2022-11-27 RX ORDER — ACETAMINOPHEN 325 MG/1
650 TABLET ORAL
Status: DISCONTINUED | OUTPATIENT
Start: 2022-11-27 | End: 2022-11-28 | Stop reason: HOSPADM

## 2022-11-27 RX ORDER — ONDANSETRON 4 MG/1
4 TABLET, ORALLY DISINTEGRATING ORAL
Status: DISCONTINUED | OUTPATIENT
Start: 2022-11-27 | End: 2022-11-28 | Stop reason: HOSPADM

## 2022-11-27 RX ORDER — LORAZEPAM 2 MG/ML
2 INJECTION, SOLUTION INTRAMUSCULAR; INTRAVENOUS ONCE
Status: COMPLETED | OUTPATIENT
Start: 2022-11-27 | End: 2022-11-27

## 2022-11-27 RX ORDER — LEVETIRACETAM 500 MG/5ML
1000 INJECTION, SOLUTION, CONCENTRATE INTRAVENOUS EVERY 12 HOURS
Status: DISCONTINUED | OUTPATIENT
Start: 2022-11-27 | End: 2022-11-28

## 2022-11-27 RX ORDER — ENOXAPARIN SODIUM 100 MG/ML
40 INJECTION SUBCUTANEOUS DAILY
Status: DISCONTINUED | OUTPATIENT
Start: 2022-11-28 | End: 2022-11-28 | Stop reason: HOSPADM

## 2022-11-27 RX ADMIN — IOPAMIDOL 80 ML: 755 INJECTION, SOLUTION INTRAVENOUS at 15:12

## 2022-11-27 RX ADMIN — LEVETIRACETAM 1000 MG: 100 INJECTION INTRAVENOUS at 12:40

## 2022-11-27 RX ADMIN — IOPAMIDOL 40 ML: 755 INJECTION, SOLUTION INTRAVENOUS at 12:13

## 2022-11-27 RX ADMIN — LORAZEPAM 2 MG: 2 INJECTION, SOLUTION INTRAMUSCULAR; INTRAVENOUS at 12:08

## 2022-11-27 NOTE — ED TRIAGE NOTES
Pt arrived via EMS from [place] [address] for aphasia, RUE weakness, confusion and BLE weakness. Last known well was 11/27 at 0730am.  Glucose 132 per EMS.

## 2022-11-27 NOTE — H&P
[address] [facility]  Hospitalist Group  History and Physical    Date of Service:  11/27/2022  Primary Care Provider: Luzmaria Franco MD  Source of information: Chart review and Spouse/family member    Chief Complaint: Altered mental status (Aphasia, confusion, right arm weakness, BLE weakness)      History of Presenting Illness:   Jesica Lanier is a 80 y.o. female who presents from her assisted living facility with concern of altered mental status and speech abnormality. Patient lives at an assisted living facility and patient is independent at baseline. This morning, patient was talking with her sister over the phone and the sister has noticed some speech abnormality where patient started saying incomprehensible words. Sister alerted staff at the patient's assisted living facility. When staff arrived to patient's room, patient was noted altered and aphasic. Subsequently 911 was called and patient presented to the emergency room under code stroke protocol. Initial head CT and CT perfusion shows no acute pathology, while going for CTA, patient has developed tonic-clonic seizure and received IV Ativan and loading dose of Keppra. At the time of my evaluation patient is completely out in the son present at bedside give the history. Patient had similar picture back in August and admitted at [facility] [address] with a diagnosis of seizure, which was her first time. Upon discharge, patient was patient was discharged on Keppra but according to the son, patient stopped taking the medication. Today, patient was evaluated by telemetry neurology and recommend admission for further evaluation management. REVIEW OF SYSTEMS:  Unable to do review of system due to patient's mental status.       Past Medical History:   Diagnosis Date    Chronic pain     mid back and left side    Hypercholesterolemia 11/7/2011    Hypertension 11/6/2014      Past Surgical History:   Procedure Laterality Date    HX ORTHOPAEDIC      HX OTHER SURGICAL      mole removal on right arm    HX WRIST FRACTURE TX Left 2017     Prior to Admission medications    Medication Sig Start Date End Date Taking? Authorizing Provider   cyanocobalamin 1,000 mcg tablet Take 1,000 mcg by mouth in the morning. Provider, Historical   cholecalciferol, vitamin D3, (Vitamin D3) 50 mcg (2,000 unit) tab Take  by mouth. Provider, Historical   levETIRAcetam (Keppra) 500 mg tablet Take 1 Tablet by mouth two (2) times a day. 7/27/22   Dagmar [address] MD   losartan (COZAAR) 100 mg tablet Take 1 Tablet by mouth in the morning. Patient not taking: No sig reported 7/28/22   Dagmar [address] MD   traMADol-acetaminophen (ULTRACET) 37.5-325 mg per tablet Take 1 Tab by mouth every eight (8) hours as needed for Pain. Max Daily Amount: 3 Tabs. Patient not taking: No sig reported 11/1/16   Bella Fair MD   cyclobenzaprine (FLEXERIL) 5 mg tablet Take 1 Tab by mouth nightly. Indications: MUSCLE SPASM  Patient not taking: No sig reported 10/14/16   Bella Fair MD   pravastatin (PRAVACHOL) 20 mg tablet Take 1 tablet by mouth nightly. For cholesterol  Patient not taking: No sig reported 11/6/14   Elsie Rodriguez MD   aspirin delayed-release 81 mg tablet Take 1 Tab by mouth daily. Patient not taking: No sig reported 9/30/13   Elsie Rodriguez MD     Allergies   Allergen Reactions    Lisinopril Cough      Family History   Problem Relation Age of Onset    Cancer Mother         colon    Heart Attack Father     Kidney Disease Sister         on dialysis      Social History:  reports that she has never smoked. She has never used smokeless tobacco. She reports that she does not drink alcohol and does not use drugs. Family and social history were personally reviewed, all pertinent and relevant details are outlined as above.     Objective:   Visit Vitals  BP (!) 164/75   Pulse 74   Temp (!) 96.6 °F (35.9 °C)   Resp 15   Ht 5' (1.524 m)   Wt 48.5 kg (106 lb 14.8 oz)   SpO2 100%   BMI 20.88 kg/m²      O2 Device: None    PHYSICAL EXAM:   General: Patient is poorly responsive, pleasant female, appears to be stated age  [de-identified]: PEERL, EOMI, moist mucus membranes  Neck: Supple, no JVD, no meningeal signs  Chest: Clear to auscultation bilaterally   CVS: RRR, S1 S2 heard, no murmurs/rubs/gallops  Abd: Soft, non-tender, non-distended, +bowel sounds   Ext: No clubbing, no cyanosis, no edema  Neuro/Psych: Unable to do complete neuro exam due to patient's mental status  Cap refill: Brisk, less than 3 seconds  Pulses: 2+, symmetric in all extremities  Skin: Warm, dry, without rashes or lesions    Data Review: All diagnostic labs and studies have been reviewed.     Abnormal Labs Reviewed   METABOLIC PANEL, COMPREHENSIVE - Abnormal; Notable for the following components:       Result Value    Anion gap 4 (*)     Glucose 105 (*)     AST (SGOT) 10 (*)     Albumin 3.4 (*)     A-G Ratio 0.9 (*)     All other components within normal limits       All Micro Results       None            IMAGING:   CT CODE NEURO PERF W CBF         CT CODE NEURO HEAD WO CONTRAST   Final Result         Given motion no change or acute abnormality      CTA CODE NEURO HEAD AND NECK W CONT    (Results Pending)        ECG/ECHO:    Results for orders placed or performed during the hospital encounter of 11/27/22   EKG, 12 LEAD, INITIAL   Result Value Ref Range    Ventricular Rate 78 BPM    Atrial Rate 78 BPM    P-R Interval 172 ms    QRS Duration 92 ms    Q-T Interval 406 ms    QTC Calculation (Bezet) 462 ms    Calculated P Axis 40 degrees    Calculated R Axis -18 degrees    Calculated T Axis 125 degrees    Diagnosis       Normal sinus rhythm  Possible Left atrial enlargement  Left ventricular hypertrophy with repolarization abnormality ( R in aVL ,   Musa product )  Abnormal ECG  When compared with ECG of 25-JUL-2022 20:27,  premature ventricular complexes are no longer present  T wave inversion more evident in Lateral leads Assessment:   Given the patient's current clinical presentation, there is a high level of concern for decompensation if discharged from the emergency department. Complex decision making was performed, which includes reviewing the patient's available past medical records, laboratory results, and imaging studies. Active Problems:    Seizure ([address] (11/27/2022)      Plan:     Breakthrough seizure  -Patient with new diagnosis of seizure 4 months ago where she was admitted at [facility] [address]  -Noted tonic-clonic seizure while evaluating in the ER  -Initial CT head and CT perfusion shows no acute pathology  -Several abnormal findings on CTA of the head and neck  -Continue Keppra IV, neurology to evaluate, further work-up including EEG and MRI to be decided by neurology  -Continue seizure precaution, n.p.o. for now until mental status improved    Hypertension  -Continue losartan and monitor blood pressure    Dyslipidemia  -Continue statin    Estimated length of stay is 2 midnights. DIET: No diet orders on file   ISOLATION PRECAUTIONS: There are currently no Active Isolations  CODE STATUS: Prior   DVT PROPHYLAXIS: Lovenox  FUNCTIONAL STATUS PRIOR TO HOSPITALIZATION: Fully active and ambulatory; able to carry on all self-care without restriction. Ambulatory status/function: By self   EARLY MOBILITY ASSESSMENT: Recommend an assessment from physical therapy and/or occupational therapy  ANTICIPATED DISCHARGE: 24-48 hours. ANTICIPATED DISPOSITION: Home  EMERGENCY CONTACT/SURROGATE DECISION MAKER:     CRITICAL CARE WAS PERFORMED FOR THIS ENCOUNTER: NO.      Signed By: Belem Vásquez MD     November 27, 2022         Please note that this dictation may have been completed with Dragon, the computer voice recognition software. Quite often unanticipated grammatical, syntax, homophones, and other interpretive errors are inadvertently transcribed by the computer software. Please disregard these errors.   Please excuse any errors that have escaped final proofreading.

## 2022-11-27 NOTE — ED PROVIDER NOTES
HPI     69-year-old female here by EMS from nursing home with aphasia. Apparently at baseline she is ANO x4.  Last seen normal at 7:30 AM today. She was found by nursing home staff approximately 20 minutes prior to arrival aphasic. For EMS, she was aphasic and would only follow some commands. Blood sugar was normal.  Vital signs are normal. There is no report of any blood thinner use. Further history is not available at this time due to aphasia. Past Medical History:   Diagnosis Date    Chronic pain     mid back and left side    Hypercholesterolemia 11/7/2011    Hypertension 11/6/2014       Past Surgical History:   Procedure Laterality Date    HX ORTHOPAEDIC      HX OTHER SURGICAL      mole removal on right arm    HX WRIST FRACTURE TX Left 2017         Family History:   Problem Relation Age of Onset    Cancer Mother         colon    Heart Attack Father     Kidney Disease Sister         on dialysis       Social History     Socioeconomic History    Marital status:      Spouse name: Not on file    Number of children: 5    Years of education: Not on file    Highest education level: Not on file   Occupational History    Not on file   Tobacco Use    Smoking status: Never    Smokeless tobacco: Never   Substance and Sexual Activity    Alcohol use: No    Drug use: No    Sexual activity: Not on file   Other Topics Concern    Not on file   Social History Narrative     for 61 years, 5 children. 9 grandchildren, 12 great grandchildren     Social Determinants of Health     Financial Resource Strain: Not on file   Food Insecurity: Not on file   Transportation Needs: Not on file   Physical Activity: Not on file   Stress: Not on file   Social Connections: Not on file   Intimate Partner Violence: Not on file   Housing Stability: Not on file         ALLERGIES: Lisinopril    Review of Systems  See hpi, otherwise not able to obtain due to aphasia. There were no vitals filed for this visit.          Physical Exam Nursing note and vitals reviewed. Constitutional: awake and alert. appears elderly and frail. No distress. Head: Normocephalic and atraumatic. Sclera anicteric  Nose: No rhinorrhea  Mouth/Throat: Oropharynx is clear and moist. Pharynx normal  Eyes: Conjunctivae are normal. Pupils are equal, round, and reactive to light. Right eye exhibits no discharge. Left eye exhibits no discharge. Neck: Painless normal range of motion. Neck supple. No LAD. Cardiovascular: Normal rate, regular rhythm, normal heart sounds and intact distal pulses. Exam reveals no gallop and no friction rub. No murmur heard. Pulmonary/Chest:  No respiratory distress. No wheezes. No rales. No rhonchi. No increased work of breathing. No accessory muscle use. Good air exchange throughout. Abdominal: soft, non-tender, no rebound or guarding. No hepatosplenomegaly. Normal bowel sounds throughout. Back: no tenderness to palpation, no deformities, no CVA tenderness  Extremities/Musculoskeletal: Normal range of motion. no tenderness. No edema. Distal extremities are neurovasc intact. Lymphadenopathy:   No adenopathy. Neurological:  awake and alert. Does not follow commands. Just nods \"yes\" to anything asked. Possibly some weakness on the right side. No facial droop. Skin: Skin is warm and dry. No rash noted. No pallor. MDM  89y F here as code stroke. Last known well about 4.5 hours prior to arrival. Will need full stroke workup and will touch base with teleneuro. Procedures    12:23 PM  Pt had a witnessed generalized tonic/clonic seizure in CT. Became cyanotic. Given IV ativan. Seizure stopped and sats improved with oxygen. Looks like a fairly recent admission to ED [facility] for seizures. ED EKG interpretation:  Rhythm: normal sinus rhythm; and regular . Rate (approx.): 78; Axis: normal; P wave: normal; QRS interval: normal ; ST/T wave: normal;  This EKG was interpreted by Renata Osorio MD,ED Provider.     Perfect Serve Consult for Admission  1:54 PM    ED Room Number: ER30/30  Patient Name and age:  Glady Scheuermann 80 y.o.  female  Working Diagnosis:   1. Seizure ([address]    2. Aphasia        COVID-19 Suspicion:  no  Sepsis present:  no  Reassessment needed: N/A  Code Status:  Do Not Resuscitate  Readmission: no  Isolation Requirements:  no  Recommended Level of Care:  med/surg  Department:[facility] Adult ED - (21 [phone]  Other:  89y F with hx of seizure disorder (recently diagnosed) here with aphasia. Last known well was 7:30a today. Around 11:30a nursing home staff found her awake but aphasic. Seems like she had some right sided weakness as well but wasn't really following commands on arrival. Made a level 1 code stroke. Got CT and CT perfusion. Was about to get CTA when she had a tonic clonic seizure. Got IV ativan. Loaded with keppra. Based on last DC summary was supposed to be on 500mg PO keppra but not listed on her med list. Neuro IR still wants to get CTA (perfusion study was normal). Also needs MRI as she did not have it done on her admission for seizures 4 months ago.        Total critical care time (not including time spent performing separately reportable procedures): 35 min

## 2022-11-27 NOTE — ED NOTES
Pt cleaned of urine. In and out urine sample collected. Pt tolerated well. Pt remains drowsy however is following turning commands.

## 2022-11-27 NOTE — PROGRESS NOTES
Neurocritical Care Code Stroke Documentation      Symptoms:   Aphasia/altered mental status  Patient also appears to have right-sided weakness on exam    Last Known Well: 0730 AM   Medical hx: Breast cancer per chart review  Patient was admitted at ED [facility] for new onset seizure. CT of Head negative for acute process at that time per review of notes. She was started on Keppra. Patient refused MRI. EEG was negative per review of notes. Past Medical History:   Diagnosis Date    Chronic pain     mid back and left side    Hypercholesterolemia 2011    Hypertension 2014      Anticoagulation: 81 mg of aspirin daily    VAN:   Positive   NIHSS:   1a-LOC:0    1b-Month/Age:2    1c-Open/Close Hand:2    2-Best Gaze:0    3-Visual Fields:0    4-Facial Palsy:0    5a-Left Arm:0    5b-Right Arm:1    6a-Left Le    6b-Right Leg:3    7-Limb Ataxia:0    8-Sensory:1    9-Best Language:3    10-Dysarthria:2    11-Extinction/Inattention:0  TOTAL SCORE:14   Imaging:   CT: IMPRESSION  Given motion no change or acute abnormality    CTA: CTA ws aborted due to patient seizing on CT stable. Patient will need to have imaging done once stable. She was brought back to the ED room for stabilization. CTP: No perfusion abnormality    Plan:   TNK Candidate: NO    Mechanical thrombectomy Candidate: NO perfusion abnormality. CTA pending        Arrival time: 1135  Time spent: 45 minutes. 1207: Patient was noted to have generalized tonic-clonic seizure. 2 mg of ativan given. Seizures subsided. Patient required nonrebreather due to oxygen saturation dropping in the 50's. O2 sats immediately increased to high 90's after supplemental oxygen given. Patient also being loaded with Keppra. Recommend patient return to CT to complete CTA to assess for any abnormality of cerebral blood vessels. Discussed with ER Physician Dr. Susana Barnett. Addendum 194: CTA shows    IMPRESSION  1.   Multifocal stenoses in the anterior and posterior circulation including  severe focal left M1-M2 junction stenosis. 2.  Diminutive posterior circulation with severe basilar artery stenoses. 3.  Mild right and moderate left vertebral artery origin stenoses. 4.  Severe focal stenosis left V3-V4 junction. Severe right V4 stenoses. 5.  Multifocal irregularities of the bilateral V3 may represent FMD versus  atherosclerosis. 6.  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 Discussed imaging findings with Dr. Angelo Lance and asked him to review and discuss any recommendations with night shift Neurocritical care NP [facility]. I also perfect served Dr. Desiree Delcid who saw patient today.        Huber Sailors, NP

## 2022-11-28 VITALS
BODY MASS INDEX: 20.99 KG/M2 | HEART RATE: 80 BPM | DIASTOLIC BLOOD PRESSURE: 81 MMHG | RESPIRATION RATE: 19 BRPM | WEIGHT: 106.92 LBS | OXYGEN SATURATION: 96 % | TEMPERATURE: 98 F | HEIGHT: 60 IN | SYSTOLIC BLOOD PRESSURE: 162 MMHG

## 2022-11-28 LAB
CHOLEST SERPL-MCNC: 226 MG/DL
HDLC SERPL-MCNC: 52 MG/DL
HDLC SERPL: 4.3 (ref 0–5)
LDLC SERPL CALC-MCNC: 155.2 MG/DL (ref 0–100)
TRIGL SERPL-MCNC: 94 MG/DL (ref ?–150)
VLDLC SERPL CALC-MCNC: 18.8 MG/DL

## 2022-11-28 PROCEDURE — 74011250637 HC RX REV CODE- 250/637: Performed by: PSYCHIATRY & NEUROLOGY

## 2022-11-28 PROCEDURE — 97161 PT EVAL LOW COMPLEX 20 MIN: CPT

## 2022-11-28 PROCEDURE — 74011250637 HC RX REV CODE- 250/637: Performed by: FAMILY MEDICINE

## 2022-11-28 PROCEDURE — 99223 1ST HOSP IP/OBS HIGH 75: CPT | Performed by: PSYCHIATRY & NEUROLOGY

## 2022-11-28 PROCEDURE — 74011250636 HC RX REV CODE- 250/636: Performed by: FAMILY MEDICINE

## 2022-11-28 PROCEDURE — 74011000250 HC RX REV CODE- 250: Performed by: FAMILY MEDICINE

## 2022-11-28 PROCEDURE — 97530 THERAPEUTIC ACTIVITIES: CPT

## 2022-11-28 RX ORDER — PRAVASTATIN SODIUM 20 MG/1
20 TABLET ORAL
Qty: 30 TABLET | Refills: 0 | Status: SHIPPED | OUTPATIENT
Start: 2022-11-28

## 2022-11-28 RX ORDER — CLOPIDOGREL BISULFATE 75 MG/1
75 TABLET ORAL DAILY
Status: DISCONTINUED | OUTPATIENT
Start: 2022-11-28 | End: 2022-11-28 | Stop reason: HOSPADM

## 2022-11-28 RX ORDER — LEVETIRACETAM 500 MG/1
500 TABLET ORAL 2 TIMES DAILY
Qty: 60 TABLET | Refills: 0 | Status: SHIPPED | OUTPATIENT
Start: 2022-11-28

## 2022-11-28 RX ORDER — GUAIFENESIN 100 MG/5ML
81 LIQUID (ML) ORAL DAILY
Qty: 30 TABLET | Refills: 0 | Status: SHIPPED | OUTPATIENT
Start: 2022-11-29 | End: 2022-11-28 | Stop reason: SDUPTHER

## 2022-11-28 RX ORDER — GUAIFENESIN 100 MG/5ML
81 LIQUID (ML) ORAL DAILY
Status: DISCONTINUED | OUTPATIENT
Start: 2022-11-28 | End: 2022-11-28 | Stop reason: HOSPADM

## 2022-11-28 RX ORDER — GUAIFENESIN 100 MG/5ML
81 LIQUID (ML) ORAL DAILY
Qty: 30 TABLET | Refills: 0 | Status: SHIPPED | OUTPATIENT
Start: 2022-11-29

## 2022-11-28 RX ORDER — LEVETIRACETAM 500 MG/1
500 TABLET ORAL 2 TIMES DAILY
Qty: 60 TABLET | Refills: 0 | Status: SHIPPED | OUTPATIENT
Start: 2022-11-28 | End: 2022-11-28 | Stop reason: SDUPTHER

## 2022-11-28 RX ORDER — CLOPIDOGREL BISULFATE 75 MG/1
75 TABLET ORAL DAILY
Qty: 30 TABLET | Refills: 0 | Status: SHIPPED | OUTPATIENT
Start: 2022-11-29 | End: 2022-11-28 | Stop reason: SDUPTHER

## 2022-11-28 RX ORDER — CLOPIDOGREL BISULFATE 75 MG/1
75 TABLET ORAL DAILY
Qty: 30 TABLET | Refills: 0 | Status: SHIPPED | OUTPATIENT
Start: 2022-11-29

## 2022-11-28 RX ORDER — LEVETIRACETAM 500 MG/1
500 TABLET ORAL 2 TIMES DAILY
Status: DISCONTINUED | OUTPATIENT
Start: 2022-11-28 | End: 2022-11-28 | Stop reason: HOSPADM

## 2022-11-28 RX ADMIN — CLOPIDOGREL BISULFATE 75 MG: 75 TABLET ORAL at 12:10

## 2022-11-28 RX ADMIN — SODIUM CHLORIDE, PRESERVATIVE FREE 10 ML: 5 INJECTION INTRAVENOUS at 00:53

## 2022-11-28 RX ADMIN — ENOXAPARIN SODIUM 40 MG: 100 INJECTION SUBCUTANEOUS at 08:45

## 2022-11-28 RX ADMIN — ASPIRIN 81 MG 81 MG: 81 TABLET ORAL at 12:10

## 2022-11-28 RX ADMIN — CYANOCOBALAMIN TAB 500 MCG 1000 MCG: 500 TAB at 08:45

## 2022-11-28 RX ADMIN — Medication 1000 UNITS: at 12:10

## 2022-11-28 RX ADMIN — SODIUM CHLORIDE, PRESERVATIVE FREE 10 ML: 5 INJECTION INTRAVENOUS at 06:14

## 2022-11-28 RX ADMIN — LEVETIRACETAM 1000 MG: 100 INJECTION INTRAVENOUS at 00:52

## 2022-11-28 NOTE — DISCHARGE SUMMARY
Discharge Summary       PATIENT ID: Sheng Tidwell  MRN: [id number]   YOB: 1932    DATE OF ADMISSION: 11/27/2022 12:17 PM    DATE OF DISCHARGE: 11/28/2022   PRIMARY CARE PROVIDER: Mark Martínez MD     ATTENDING PHYSICIAN: Barbara Stanley  DISCHARGING PROVIDER: Chente Holly MD    To contact this individual call [phone] and ask the  to page. If unavailable ask to be transferred the Adult Hospitalist Department. CONSULTATIONS: IP CONSULT TO NEUROLOGY  IP CONSULT TO NEUROINTERVENTIONAL SURGERY    PROCEDURES/SURGERIES: * No surgery found *    DISCHARGE DIAGNOSES:     Breakthrough seizure  Hypothermia  Hypertension  Dyslipidemia    ADMISSION SUMMARY AND HOSPITAL COURSE:     HPI  Sheng Tidwell is a 80 y.o. female who presents from her assisted living facility with concern of altered mental status and speech abnormality. Patient lives at an assisted living facility and patient is independent at baseline. This morning, patient was talking with her sister over the phone and the sister has noticed some speech abnormality where patient started saying incomprehensible words. Sister alerted staff at the patient's assisted living facility. When staff arrived to patient's room, patient was noted altered and aphasic. Subsequently 911 was called and patient presented to the emergency room under code stroke protocol. Initial head CT and CT perfusion shows no acute pathology, while going for CTA, patient has developed tonic-clonic seizure and received IV Ativan and loading dose of Keppra. At the time of my evaluation patient is completely out in the son present at bedside give the history. Patient had similar picture back in August and admitted at [facility] [address] with a diagnosis of seizure, which was her first time. Upon discharge, patient was patient was discharged on Keppra but according to the son, patient stopped taking the medication.   Today, patient was evaluated by telemetry neurology and recommend admission for further evaluation management. Hospital Course  Patient was admitted for further evaluation of abnormal speech. While in the emergency room patient was noted to have tonic-clonic seizure. Initial head CT and CT perfusion shows no acute pathology, CTA of the head and neck with multiple intracranial stenosis as well as noted intracranial aneurysm. Patient refused MRI. Patient was evaluated by neurology and neuro interventional surgery. Patient with recent admission for seizure at a different facility and was discharged home on Keppra but patient has stopped taking the medication. Patient was resumed on Keppra, no further seizure activity is noted. Prior neuro interventional, no further work-up or intervention needed, patient needs to follow-up with neurology as outpatient for continue monitoring of patient on outpatient basis. Per family, patient walks short distances with a walker and uses wheelchair for long distances. She lives at an assisted living facility. Patient was evaluated by PT and discharged back to her living facility. DISCHARGE DIAGNOSES / PLAN:        BMI: Body mass index is 20.88 kg/m². . This patient: Has a BMI within normal limits. PENDING TEST RESULTS:   At the time of discharge the following test results are still pending: None     ADDITIONAL CARE RECOMMENDATIONS:     Follow-up with neurology as scheduled. NOTIFY YOUR PHYSICIAN FOR ANY OF THE FOLLOWING:   Fever over 101 degrees for 24 hours. Chest pain, shortness of breath, fever, chills, nausea, vomiting, diarrhea, change in mentation, falling, weakness, bleeding. Severe pain or pain not relieved by medications, as well as any other signs or symptoms that you may have questions about.     FOLLOW UP APPOINTMENTS:    Follow-up Information    None            DIET: Cardiac Diet    ACTIVITY: Activity as tolerated    EQUIPMENT needed: None    DISCHARGE MEDICATIONS:  Current Discharge Medication List        START taking these medications    Details   aspirin 81 mg chewable tablet Take 1 Tablet by mouth daily. Qty: 30 Tablet, Refills: 0  Start date: 11/29/2022      clopidogreL (PLAVIX) 75 mg tab Take 1 Tablet by mouth daily. Qty: 30 Tablet, Refills: 0  Start date: 11/29/2022           CONTINUE these medications which have CHANGED    Details   levETIRAcetam (KEPPRA) 500 mg tablet Take 1 Tablet by mouth two (2) times a day. Qty: 60 Tablet, Refills: 0  Start date: 11/28/2022           CONTINUE these medications which have NOT CHANGED    Details   cyanocobalamin 1,000 mcg tablet Take 1,000 mcg by mouth in the morning. cholecalciferol, vitamin D3, (Vitamin D3) 50 mcg (2,000 unit) tab Take  by mouth. STOP taking these medications       losartan (COZAAR) 100 mg tablet Comments:   Reason for Stopping:         traMADol-acetaminophen (ULTRACET) 37.5-325 mg per tablet Comments:   Reason for Stopping:         cyclobenzaprine (FLEXERIL) 5 mg tablet Comments:   Reason for Stopping:         pravastatin (PRAVACHOL) 20 mg tablet Comments:   Reason for Stopping:         aspirin delayed-release 81 mg tablet Comments:   Reason for Stopping:               DISPOSITION:    Home With:   OT  PT  HH  RN       Long term SNF/Inpatient Rehab    Independent/assisted living    Hospice   * Other: Assisted living       PATIENT CONDITION AT DISCHARGE:     Functional status    Poor    * Deconditioned     Independent      Cognition    * Lucid     Forgetful     Dementia      Catheters/lines (plus indication)    [place]     PICC     PEG    * None      Code status    * Full code     DNR      PHYSICAL EXAMINATION AT DISCHARGE:  General:          Alert, cooperative, no distress, appears stated age. HEENT:           Atraumatic, anicteric sclerae, pink conjunctivae                          No oral ulcers, mucosa moist, throat clear, dentition fair  Neck:               Supple, symmetrical  Lungs:             Clear to auscultation bilaterally.   No Wheezing or Rhonchi. No rales. Chest wall:      No tenderness  No Accessory muscle use. Heart:              Regular  rhythm,  No  murmur   No edema  Abdomen:        Soft, non-tender. Not distended. Bowel sounds normal  Extremities:     No cyanosis. No clubbing,                            Skin turgor normal, Capillary refill normal  Skin:                Not pale. Not Jaundiced  No rashes   Psych:             Not anxious or agitated.   Neurologic:      Alert, moves all extremities, answers questions appropriately and responds to commands       CHRONIC MEDICAL DIAGNOSES:  Problem List as of 11/28/2022 Date Reviewed: 8/10/2022            Codes Class Noted - Resolved    Seizure ([facility] ICD-10-CM: R56.9  ICD-9-CM: 780.39  11/27/2022 - Present        Complex partial seizures evolving to generalized tonic-clonic seizures ([facility] ICD-10-CM: G40.209  ICD-9-CM: 345.40  7/26/2022 - Present        Bilateral carotid artery stenosis ICD-10-CM: I65.23  ICD-9-CM: 433.10, 433.30  7/26/2022 - Present        Convulsive syncope ICD-10-CM: R55  ICD-9-CM: 780.2, 780.39  7/26/2022 - Present        Acute alteration in mental status ICD-10-CM: R41.82  ICD-9-CM: 780.97  7/26/2022 - Present        Cerebral microvascular disease ICD-10-CM: I67.89  ICD-9-CM: 437.8  7/26/2022 - Present        New onset seizure ([facility] ICD-10-CM: R56.9  ICD-9-CM: 780.39  7/25/2022 - Present        COVID-19 ICD-10-CM: U07.1  ICD-9-CM: 079.89  1/14/2022 - Present        Elevated troponin ICD-10-CM: R77.8  ICD-9-CM: 790.6  1/14/2022 - Present        Sciatica of right side ICD-10-CM: M54.31  ICD-9-CM: 724.3  5/23/2016 - Present        Essential hypertension ICD-10-CM: I10  ICD-9-CM: 401.9  5/7/2015 - Present        Hypercholesterolemia ICD-10-CM: E78.00  ICD-9-CM: 272.0  11/7/2011 - Present        Chronic pain ICD-10-CM: G89.29  ICD-9-CM: 338.29  Unknown - Present    Overview Signed 4/11/2011  9:37 AM by Janel Irizarry MD     mid back and left side RESOLVED: Hypertension ICD-10-CM: I10  ICD-9-CM: 401.9  11/6/2014 - 5/7/2015           Greater than 60 minutes were spent with the patient on counseling and coordination of care    Signed:   Marzena Varela MD  11/28/2022  3:27 PM

## 2022-11-28 NOTE — ED NOTES
Bedside and Verbal shift change report given to [facility], RN (oncoming nurse) by Chely Stringer RN (offgoing nurse). Report included the following information ED Summary, MAR, Recent Results, Cardiac Rhythm SR, and Dual Neuro Assessment.

## 2022-11-28 NOTE — PROGRESS NOTES
1650 - PIV's removed. Discharge instructions with paper scripts given to family at bedside. They will provide ride back to assisted living at [place]. Attempted to call report, however, no answer.

## 2022-11-28 NOTE — CONSULTS
Neurology Consult Note     NAME: Caroline Rausch   :  1932   MRN:  [id number]   DATE:  2022       HPI:  Pt is a 81yo RH female with h/o new onset seizures 22, seen by Dr. Niurka Mcleod at [facility] at that time and started on Keppra 500mg bid. CTH and EEG were unremarkable in July. MRI was refused by pt. She was admitted 22 with aphasia and possibly mild right sided weakness at her SNF. While in CT she had a GTC seizure and was given Ativan and loaded with Keppra. She was supposed to be taking Keppra 500mg bid, but pt tells me she was not taking it, doesn't like to take medications. States she didn't have a seizure since July. She has no memory of events that brought her here. Was told she was talking funny and she had a seizure. No family h/o epilepsy, no h/o head injury, no h/o CNS infection. She is taking Tramadol at home. CTH neg, CTP neg, CTA H/N with multifocal stenoses with severe left M1-M2 junction stenosis, severe basilar artery stenosis, severe left V3-V4 stenosis, severe right V4 stenosis, multiple 2-3mm aneurysms seen. Pt was on ASA 81mg daily at SNF. Has pravastatin 20mg daily listed as home med. Denies h/o HTN (though it is listed in [address]) or smoking. No known family h/o cerebral aneurysms.      PMH:  HTN  HLD  Chronic back pain    ROS:  Per HPI o/w neg    MEDS:      Current Facility-Administered Medications:     levETIRAcetam (KEPPRA) injection 1,000 mg, 1,000 mg, IntraVENous, Q12H, Tanvir Cao MD, 1,000 mg at 22 0713    cholecalciferol (VITAMIN D3) (1000 Units /25 mcg) tablet 1,000 Units, 1,000 Units, Oral, DAILY, Tanvir Cao MD    cyanocobalamin (VITAMIN B12) tablet 1,000 mcg, 1,000 mcg, Oral, DAILY, Vikas Cao MD, 1,000 mcg at 22 0845    sodium chloride (NS) flush 5-40 mL, 5-40 mL, IntraVENous, Q8H, Tanvir Cao MD, 10 mL at 11/28/22 0614    sodium chloride (NS) flush 5-40 mL, 5-40 mL, IntraVENous, PRN, Asuncion Rothman MD    acetaminophen (TYLENOL) tablet 650 mg, 650 mg, Oral, Q6H PRN **OR** acetaminophen (TYLENOL) suppository 650 mg, 650 mg, Rectal, Q6H PRN, Tanvir Cao MD    polyethylene glycol (MIRALAX) packet 17 g, 17 g, Oral, DAILY PRN, Tanvir Cao MD    ondansetron (ZOFRAN ODT) tablet 4 mg, 4 mg, Oral, Q8H PRN **OR** ondansetron (ZOFRAN) injection 4 mg, 4 mg, IntraVENous, Q6H PRN, Asuncion Rothman MD    enoxaparin (LOVENOX) injection 40 mg, 40 mg, SubCUTAneous, DAILY, Tanvir Cao MD, 40 mg at 11/28/22 0845    Current Outpatient Medications:     cyanocobalamin 1,000 mcg tablet, Take 1,000 mcg by mouth in the morning., Disp: , Rfl:     cholecalciferol, vitamin D3, (Vitamin D3) 50 mcg (2,000 unit) tab, Take  by mouth., Disp: , Rfl:     levETIRAcetam (Keppra) 500 mg tablet, Take 1 Tablet by mouth two (2) times a day., Disp: 60 Tablet, Rfl: 0    losartan (COZAAR) 100 mg tablet, Take 1 Tablet by mouth in the morning. (Patient not taking: No sig reported), Disp: 30 Tablet, Rfl: 0    traMADol-acetaminophen (ULTRACET) 37.5-325 mg per tablet, Take 1 Tab by mouth every eight (8) hours as needed for Pain. Max Daily Amount: 3 Tabs. (Patient not taking: No sig reported), Disp: 30 Tab, Rfl: 0    cyclobenzaprine (FLEXERIL) 5 mg tablet, Take 1 Tab by mouth nightly. Indications: MUSCLE SPASM (Patient not taking: No sig reported), Disp: 20 Tab, Rfl: 1    pravastatin (PRAVACHOL) 20 mg tablet, Take 1 tablet by mouth nightly. For cholesterol (Patient not taking: No sig reported), Disp: 90 tablet, Rfl: 3    aspirin delayed-release 81 mg tablet, Take 1 Tab by mouth daily. (Patient not taking: No sig reported), Disp: , Rfl:   Pt is not taking Keppra      Allergies   Allergen Reactions    Lisinopril Cough       SH:     Resides at [facility]  No T/E/D    FH:  M- Colon CA  F- MI  Sister - Kidney disease      PHYSICAL EXAM:    Visit Vitals  BP (!) 176/74   Pulse 64   Temp 97.5 °F (36.4 °C)   Resp 14   Ht 5' (1.524 m)   Wt 106 lb 14.8 oz (48.5 kg)   SpO2 96%   BMI 20.88 kg/m²     Temp (24hrs), Av °F (36.1 °C), Min:95.8 °F (35.4 °C), Max:98.5 °F (36.9 °C)  General: Well developed well nourished patient in no apparent distress. Cardiac: Regular rate and rhythm with no murmurs. Carotids: 2+ symmetric, no bruits  Extremities: 2+ Radial pulses, no cyanosis or edema    Neurological Exam:  Mental Status: Oriented to time, place and person. Speech -intact. Language -able to name, repeat, and follow commands. Attention and fund of knowledge appropriate. Normal recent and remote memory. Cranial Nerves:   VFF, PERRL, EOMI, no nystagmus, no diplopia, no ptosis. Facial sensation is normal. Facial movement is symmetric. Palate is midline. Tongue is midline. Hearing is intact. Motor:  5/5 strength in upper and lower proximal and distal muscles. Normal bulk and tone. No PD. No tremors   Reflexes:   Deep tendon reflexes 2+ and symmetric. Toes downgoing. Sensory:   Intact to LT and PP   Gait:     Cerebellar:  Intact FTN, WILLIAM          STUDIES AND REPORTS:  Recent Results (from the past 24 hour(s))   CBC WITH AUTOMATED DIFF    Collection Time: 22 11:54 AM   Result Value Ref Range    WBC 4.9 3.6 - 11.0 K/uL    RBC 3.80 3.80 - 5.20 M/uL    HGB 12.2 11.5 - 16.0 g/dL    HCT 36.9 35.0 - 47.0 %    MCV 97.1 80.0 - 99.0 FL    MCH 32.1 26.0 - 34.0 PG    MCHC 33.1 30.0 - 36.5 g/dL    RDW 12.1 11.5 - 14.5 %    PLATELET 434 507 - 982 K/uL    MPV 10.9 8.9 - 12.9 FL    NRBC 0.0 0  WBC    ABSOLUTE NRBC 0.00 0.00 - 0.01 K/uL    NEUTROPHILS 69 32 - 75 %    LYMPHOCYTES 20 12 - 49 %    MONOCYTES 9 5 - 13 %    EOSINOPHILS 1 0 - 7 %    BASOPHILS 1 0 - 1 %    IMMATURE GRANULOCYTES 0 0.0 - 0.5 %    ABS. NEUTROPHILS 3.3 1.8 - 8.0 K/UL    ABS. LYMPHOCYTES 1.0 0.8 - 3.5 K/UL    ABS. MONOCYTES 0.5 0.0 - 1.0 K/UL    ABS. EOSINOPHILS 0.1 0.0 - 0.4 K/UL    ABS.  BASOPHILS 0.0 0.0 - 0.1 K/UL ABS. IMM. GRANS. 0.0 0.00 - 0.04 K/UL    DF AUTOMATED     METABOLIC PANEL, COMPREHENSIVE    Collection Time: 11/27/22 11:54 AM   Result Value Ref Range    Sodium 137 136 - 145 mmol/L    Potassium 3.8 3.5 - 5.1 mmol/L    Chloride 104 97 - 108 mmol/L    CO2 29 21 - 32 mmol/L    Anion gap 4 (L) 5 - 15 mmol/L    Glucose 105 (H) 65 - 100 mg/dL    BUN 13 6 - 20 MG/DL    Creatinine 0.85 0.55 - 1.02 MG/DL    BUN/Creatinine ratio 15 12 - 20      eGFR >60 >60 ml/min/1.73m2    Calcium 9.3 8.5 - 10.1 MG/DL    Bilirubin, total 0.3 0.2 - 1.0 MG/DL    ALT (SGPT) 14 12 - 78 U/L    AST (SGOT) 10 (L) 15 - 37 U/L    Alk. phosphatase 73 45 - 117 U/L    Protein, total 7.1 6.4 - 8.2 g/dL    Albumin 3.4 (L) 3.5 - 5.0 g/dL    Globulin 3.7 2.0 - 4.0 g/dL    A-G Ratio 0.9 (L) 1.1 - 2.2     SAMPLES BEING HELD    Collection Time: 11/27/22 11:54 AM   Result Value Ref Range    SAMPLES BEING HELD 1RED 1BLUE     COMMENT        Add-on orders for these samples will be processed based on acceptable specimen integrity and analyte stability, which may vary by analyte.    EKG, 12 LEAD, INITIAL    Collection Time: 11/27/22 12:24 PM   Result Value Ref Range    Ventricular Rate 78 BPM    Atrial Rate 78 BPM    P-R Interval 172 ms    QRS Duration 92 ms    Q-T Interval 406 ms    QTC Calculation (Bezet) 462 ms    Calculated P Axis 40 degrees    Calculated R Axis -18 degrees    Calculated T Axis 125 degrees    Diagnosis       Normal sinus rhythm  Possible Left atrial enlargement  Left ventricular hypertrophy with repolarization abnormality ( R in aVL ,   Musa product )  Abnormal ECG  When compared with ECG of 25-JUL-2022 20:27,  premature ventricular complexes are no longer present  T wave inversion more evident in Lateral leads  Confirmed by Manuel Glynn MD. ([id number]) on 11/27/2022 9:50:28 PM     URINALYSIS W/MICROSCOPIC    Collection Time: 11/27/22  4:29 PM   Result Value Ref Range    Color YELLOW/STRAW      Appearance CLEAR CLEAR      Specific gravity 1.025 1.003 - 1.030      pH (UA) 7.5 5.0 - 8.0      Protein Negative NEG mg/dL    Glucose Negative NEG mg/dL    Ketone Negative NEG mg/dL    Bilirubin Negative NEG      Blood Negative NEG      Urobilinogen 0.2 0.2 - 1.0 EU/dL    Nitrites Negative NEG      Leukocyte Esterase Negative NEG      WBC 0-4 0 - 4 /hpf    RBC 0-5 0 - 5 /hpf    Epithelial cells FEW FEW /lpf    Bacteria Negative NEG /hpf    Hyaline cast 0-2 0 - 5 /lpf     MRI Results (most recent):  No results found for this or any previous visit. CT Results (most recent):  Results from Hospital Encounter encounter on 11/27/22    CT CODE NEURO PERF W CBF    Narrative  *PRELIMINARY REPORT*    No identified perfusion abnormality. Preliminary report was provided by Dr. Carla Rivera, the on-call radiologist, at  13:48    Final report to follow. *END PRELIMINARY REPORT*    FINAL REPORT:    INDICATION: aphasia    EXAMINATION:  CT PERFUSION HEAD    COMPARISON: None    TECHNIQUE:  Following the uneventful administration of  intravenous contrast,  axial CT perfusion of the brain was performed. CT dose reduction was achieved  through use of a standardized protocol tailored for this examination and  automatic exposure control for dose modulation. Cerebral perfusion analysis using computed tomography with contrast  administration, including post processing of parametric maps with the  termination of cerebral blood flow, cerebral blood volume, and mean transit  time. This study was analyzed by the [address] ai algorithm. FINDINGS:    CT PERFUSION:  There are no regional areas of elevated Tmax, decreased cerebral blood flow or  blood volume. RCBF < 30% = 0 cc. Tmax > 6 seconds = 0 cc. Mismatch volume = 0 cc. Impression  No perfusion abnormality. Assessment and Plan:   Pt is a 79yo RH female with new onset seizures 7/26/22, non-compliant with Keppra, now presenting with aphasia and possibly right sided weakness with witness GTC sz in [place] [address].   No known seizure risk factors other than taking Tramadol at home. CTH neg, CTP neg, CTA H/N with multifocal stenoses with severe left M1-M2 junction stenosis, severe basilar artery stenosis, severe left V3-V4 stenosis, severe right V4 stenosis, multiple 2-3mm aneurysms seen. Exam is non-focal and unremarkable. Discussed diagnosis of epilepsy and need to take an ASD to prevent recurrent seizures. Start Keppra 500mg bid, side effects reviewed. Pt refusing MRI brain to assess for underlying seizure focus. Stop Tramadol as this is contraindicated in patients with seizures. Discussed severe intracranial stenoses, recommend continuing ASA 81mg daily and adding Plavix 75mg daily. Continue pravastatin 20mg daily. Lipid panel ordered, goal LDL<70. 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 [facility] is seeing pt. Needs fu in neurology with Dr. [facility] at [facility] or his NP. Signed: Randall Chatman MD    Addendum:  Additional history provided by family after the visit. She does have a h/o HTN, but PCP told her she no longer needed to take her medications. She is also not taking Pravachol at home. She is taking an ASA 81mg daily. They don't believe she is taking Ultracet any longer. She does not drive. She ambulates with a walker, and also has a WC at home if needed. She is stable for discharge from a neuro standpoint, please call if needed. Communicated this to Dr. Elena Guo.

## 2022-11-28 NOTE — PROGRESS NOTES
PHYSICAL THERAPY EVALUATION  Patient: Shital Cox (68 y.o. female)  Date: 11/28/2022  Primary Diagnosis: Seizure [facility]) [R56.9]       Precautions:  Fall    ASSESSMENT  Based on the objective data described below, the patient presents near her baseline functional mobility (ambulates short distances inside her apartment with her rollator walker, relies on the [facility] pushed by staff to/ from the dining room for meals). Pt endorses participating in physical therapy at the facility, sleeps in her recliner. Patient is generally SBA to Min A for functional mobility. She is able to get off the ED plinth and stand on her own with stand by assist for safety to improve her hand placement with transfers (likely baseline). She ambulated 30 ft with a RW with intermittent assist to manage the walker when turning likely d/t the type of walker being used (RW vs rollator). Noted the plan to DC today. Patient is appropriate to return to her [facility] with increased staff assist/ supervision in her apartment until cleared by the facility staff/ therapist for independent ambulation with her rollator walker and transfers to/ from her recliner. Discussed with ED RN for report to the facility and informed of pt's BP. If pt does not discharge, acute PT will follow. Vitals:    11/28/22 1200 11/28/22 1507 11/28/22 1515 11/28/22 1520   BP: (!) 160/59 (!) 185/77 (!) 149/82 (asymptomatic) (!) 162/81   BP 1 Location:  Right upper arm Right upper arm Right upper arm   BP Patient Position:  Semi fowlers; At rest Standing Other (Comment)  Comment: after walking   Pulse: 66 70 76 80   Temp: 98 °F (36.7 °C)      Resp: 16 19     SpO2: 96%  RA          Current Level of Function Impacting Discharge (mobility/balance):  Supine to sit: SBA; Sit<->stand SBA (hands remained on the walker);  Ambulated 30 ft with a two wheel RW w/ intermittent assist to manage turns likely d/t the type of walker being used (relies on a rollator at the facility), is otherwise CGA w/ cues to improve body position with the walker/ technique    Functional Outcome Measure: The patient scored 17/28 on the Tinetti outcome measure which is indicative of high fall risk. Other factors to consider for discharge: from [facility]     Patient will benefit from skilled therapy intervention to address the above noted impairments. PLAN :  Recommendations and Planned Interventions: transfer training, gait training, therapeutic exercises, neuromuscular re-education, patient and family training/education, and therapeutic activities      Frequency/Duration: Patient will be followed by physical therapy:  5 times a week to address goals. Recommendation for discharge: (in order for the patient to meet his/her long term goals)  Resume therapy at the facility    This discharge recommendation:  Has not yet been discussed the attending provider and/or case management; Discussed with RN return to the facility with increased staff assist when she returns until cleared by their staff (or therapist) for independent mobilization inside her apartment.       IF patient discharges home will need the following DME: patient owns DME required for discharge         SUBJECTIVE:       OBJECTIVE DATA SUMMARY:   HISTORY:    Past Medical History:   Diagnosis Date    Chronic pain     mid back and left side    Hypercholesterolemia 11/7/2011    Hypertension 11/6/2014     Past Surgical History:   Procedure Laterality Date    HX ORTHOPAEDIC      HX OTHER SURGICAL      mole removal on right arm    HX WRIST FRACTURE TX Left 2017       Personal factors and/or comorbidities impacting plan of care: PMH/ [facility]    Home Situation  Home Environment: Assisted living ([place] since sept 2022)  One/Two Story Residence: One story  Living Alone: No  Support Systems: Assisted Living  Patient Expects to be Discharged to[de-identified] Assisted Living  Current DME Used/Available at Home: Walker, rolling, Wheelchair    EXAMINATION/PRESENTATION/DECISION MAKING:   Critical Behavior:  Neurologic State: Alert  Orientation Level: Oriented to person, Oriented to place  Cognition: Follows commands, calm, cooperative     Hearing:  Hard of hearing    Edema: none noted  Range Of Motion:  AROM: Generally decreased, functional                       Strength:    Strength: Generally decreased, functional                    Tone & Sensation:   Tone: Normal                              Coordination:  Coordination: Generally decreased, functional    Functional Mobility:  Bed Mobility:  Supine to Sit: Stand-by assistance  Sit to Supine: Other (comment) (remained sitting in a chair)  Scooting: Supervision  Transfers:  Sit to Stand: Stand-by assistance (pulls up on walker)  Stand to Sit: Stand-by assistance (hands remained on walker though with controlled descent)  VC for hand placement to improve safety with transfers. Corrects with tactile cues. Balance:   Sitting: Intact; Without support  Standing: Impaired; Without support  Standing - Static: Good;Constant support  Standing - Dynamic : Good;Fair;Constant support  Ambulation/Gait Training:  Distance (ft): 30 Feet (ft)  Assistive Device: Gait belt;Walker, rolling  Ambulation - Level of Assistance: Contact guard assistance;Minimal assistance; Additional time; Adaptive equipment;Assist x1     Gait Description (WDL): Exceptions to WDL                 Speed/Irlanda: Slow  Step Length: Right shortened;Left shortened              Cues provided to keep walker in closer proximity.   Assist needed to manage RW w/ turns (pt uses a rollator walker at the faciliy)        Functional Measure:  Tinetti test:    Sitting Balance: 1  Arises: 1  Attempts to Rise: 2  Immediate Standing Balance: 1  Standing Balance: 1  Nudged: 0  Eyes Closed: 0  Turn 360 Degrees - Continuous/Discontinuous: 1 (w/ walker)  Turn 360 Degrees - Steady/Unsteady: 1 (w/ walker)  Sitting Down: 1  Balance Score: 9 Balance total score  Indication of Gait: 1  R Step Length/Height: 1  L Step Length/Height: 1  R Foot Clearance: 1  L Foot Clearance: 1  Step Symmetry: 1  Step Continuity: 1  Path: 1  Trunk: 0  Walking Time: 0  Gait Score: 8 Gait total score  Total Score: 17/28 Overall total score         Tinetti Tool Score Risk of Falls  <19 = High Fall Risk  19-24 = Moderate Fall Risk  25-28 = Low Fall Risk  Tinetti ME. Performance-Oriented Assessment of Mobility Problems in Elderly Patients. [facility]; [id number]. (Scoring Description: PT Bulletin Feb. 10, 1993)    Older adults: Arely Lemus et al, 2009; n = 1000 [facility] elderly evaluated with ABC, MARY, ADL, and IADL)  · Mean MAYR score for males aged 69-68 years = 26.21(3.40)  · Mean MARY score for females age 69-68 years = 25.16(4.30)  · Mean MARY score for males over 80 years = 23.29(6.02)  · Mean MARY score for females over 80 years = 17.20(8.32)            Physical Therapy Evaluation Charge Determination   History Examination Presentation Decision-Making   LOW Complexity : Zero comorbidities / personal factors that will impact the outcome / POC LOW Complexity : 1-2 Standardized tests and measures addressing body structure, function, activity limitation and / or participation in recreation  LOW Complexity : Stable, uncomplicated  Other outcome measures Tinetti 17/28  HIGH       Based on the above components, the patient evaluation is determined to be of the following complexity level: LOW     Pain Rating:  None voiced    Activity Tolerance:   Fair and requires rest breaks    After treatment patient left in no apparent distress:   Sitting in chair, Call bell within reach, Caregiver / family present, and RN aware     COMMUNICATION/EDUCATION:   The patients plan of care was discussed with: Registered nurse. Fall prevention education was provided and the patient/caregiver indicated understanding., Patient/family have participated as able in goal setting and plan of care. , and Patient/family agree to work toward stated goals and plan of care.     Thank you for this referral.  Tamika Tolliver, PT   Time Calculation: 26 mins

## 2022-11-28 NOTE — DISCHARGE SUMMARY
Discharge Summary       PATIENT ID: Damian Bhardwaj  MRN: [id number]   YOB: 1932    DATE OF ADMISSION: 11/27/2022 12:17 PM    DATE OF DISCHARGE: 11/28/2022   PRIMARY CARE PROVIDER: Ruma Bay MD     ATTENDING PHYSICIAN: Gabriela Natarajan  DISCHARGING PROVIDER: Claudia Gregorio MD    To contact this individual call [phone] and ask the  to page. If unavailable ask to be transferred the Adult Hospitalist Department. CONSULTATIONS: IP CONSULT TO NEUROLOGY  IP CONSULT TO NEUROINTERVENTIONAL SURGERY    PROCEDURES/SURGERIES: * No surgery found *    DISCHARGE DIAGNOSES:     Breakthrough seizure  Hypothermia  Hypertension  Dyslipidemia    ADMISSION SUMMARY AND HOSPITAL COURSE:     HPI  Damian Bhardwaj is a 80 y.o. female who presents from her assisted living facility with concern of altered mental status and speech abnormality. Patient lives at an assisted living facility and patient is independent at baseline. This morning, patient was talking with her sister over the phone and the sister has noticed some speech abnormality where patient started saying incomprehensible words. Sister alerted staff at the patient's assisted living facility. When staff arrived to patient's room, patient was noted altered and aphasic. Subsequently 911 was called and patient presented to the emergency room under code stroke protocol. Initial head CT and CT perfusion shows no acute pathology, while going for CTA, patient has developed tonic-clonic seizure and received IV Ativan and loading dose of Keppra. At the time of my evaluation patient is completely out in the son present at bedside give the history. Patient had similar picture back in August and admitted at [facility] [address] with a diagnosis of seizure, which was her first time. Upon discharge, patient was patient was discharged on Keppra but according to the son, patient stopped taking the medication.   Today, patient was evaluated by telemetry neurology and recommend admission for further evaluation management. Hospital Course  Patient was admitted for further evaluation of abnormal speech. While in the emergency room patient was noted to have tonic-clonic seizure. Initial head CT and CT perfusion shows no acute pathology, CTA of the head and neck with multiple intracranial stenosis as well as noted intracranial aneurysm. Patient refused MRI. Patient was evaluated by neurology and neuro interventional surgery. Patient with recent admission for seizure at a different facility and was discharged home on Keppra but patient has stopped taking the medication. Patient was resumed on Keppra, no further seizure activity is noted. Prior neuro interventional, no further work-up or intervention needed, patient needs to follow-up with neurology as outpatient for continue monitoring of patient on outpatient basis. Per family, patient walks short distances with a walker and uses wheelchair for long distances. She lives at an assisted living facility. Patient was evaluated by PT and discharged back to her living facility. DISCHARGE DIAGNOSES / PLAN:          BMI: Body mass index is 20.88 kg/m². . This patient: Has a BMI within normal limits. PENDING TEST RESULTS:   At the time of discharge the following test results are still pending: None     ADDITIONAL CARE RECOMMENDATIONS:     Follow up with neurology as scheduled. NOTIFY YOUR PHYSICIAN FOR ANY OF THE FOLLOWING:   Fever over 101 degrees for 24 hours. Chest pain, shortness of breath, fever, chills, nausea, vomiting, diarrhea, change in mentation, falling, weakness, bleeding. Severe pain or pain not relieved by medications, as well as any other signs or symptoms that you may have questions about.     FOLLOW UP APPOINTMENTS:    Follow-up Information       Follow up With Specialties Details Why Contact Info    Janeth Lucero MD Internal Medicine Physician   [address]  [phone] DIET: Cardiac Diet    ACTIVITY: Activity as tolerated    EQUIPMENT needed: None    DISCHARGE MEDICATIONS:  Current Discharge Medication List        START taking these medications    Details   aspirin 81 mg chewable tablet Take 1 Tablet by mouth daily. Qty: 30 Tablet, Refills: 0  Start date: 11/29/2022      clopidogreL (PLAVIX) 75 mg tab Take 1 Tablet by mouth daily. Qty: 30 Tablet, Refills: 0  Start date: 11/29/2022           CONTINUE these medications which have CHANGED    Details   pravastatin (PRAVACHOL) 20 mg tablet Take 1 Tablet by mouth nightly. Qty: 30 Tablet, Refills: 0  Start date: 11/28/2022      levETIRAcetam (KEPPRA) 500 mg tablet Take 1 Tablet by mouth two (2) times a day. Qty: 60 Tablet, Refills: 0  Start date: 11/28/2022           CONTINUE these medications which have NOT CHANGED    Details   cyanocobalamin 1,000 mcg tablet Take 1,000 mcg by mouth in the morning. cholecalciferol, vitamin D3, (Vitamin D3) 50 mcg (2,000 unit) tab Take  by mouth. STOP taking these medications       losartan (COZAAR) 100 mg tablet Comments:   Reason for Stopping:         traMADol-acetaminophen (ULTRACET) 37.5-325 mg per tablet Comments:   Reason for Stopping:         cyclobenzaprine (FLEXERIL) 5 mg tablet Comments:   Reason for Stopping:         aspirin delayed-release 81 mg tablet Comments:   Reason for Stopping:               DISPOSITION:    Home With:   OT  PT  HH  RN       Long term SNF/Inpatient Rehab    Independent/assisted living    Hospice   * Other: [facility]       PATIENT CONDITION AT DISCHARGE:     Functional status    Poor    * Deconditioned     Independent      Cognition   *  Lucid     Forgetful     Dementia      Catheters/lines (plus indication)    [place]     PICC     PEG    * None      Code status    * Full code     DNR      PHYSICAL EXAMINATION AT DISCHARGE:  General:          Alert, cooperative, no distress, appears stated age.      HEENT:           Atraumatic, anicteric sclerae, pink conjunctivae                          No oral ulcers, mucosa moist, throat clear, dentition fair  Neck:               Supple, symmetrical  Lungs:             Clear to auscultation bilaterally. No Wheezing or Rhonchi. No rales. Chest wall:      No tenderness  No Accessory muscle use. Heart:              Regular  rhythm,  No  murmur   No edema  Abdomen:        Soft, non-tender. Not distended. Bowel sounds normal  Extremities:     No cyanosis. No clubbing,                            Skin turgor normal, Capillary refill normal  Skin:                Not pale. Not Jaundiced  No rashes   Psych:             Not anxious or agitated.   Neurologic:      Alert, moves all extremities, answers questions appropriately and responds to commands       CHRONIC MEDICAL DIAGNOSES:  Problem List as of 11/28/2022 Date Reviewed: 8/10/2022            Codes Class Noted - Resolved    Seizure ([facility] ICD-10-CM: R56.9  ICD-9-CM: 780.39  11/27/2022 - Present        Complex partial seizures evolving to generalized tonic-clonic seizures ([facility] ICD-10-CM: G40.209  ICD-9-CM: 345.40  7/26/2022 - Present        Bilateral carotid artery stenosis ICD-10-CM: I65.23  ICD-9-CM: 433.10, 433.30  7/26/2022 - Present        Convulsive syncope ICD-10-CM: R55  ICD-9-CM: 780.2, 780.39  7/26/2022 - Present        Acute alteration in mental status ICD-10-CM: R41.82  ICD-9-CM: 780.97  7/26/2022 - Present        Cerebral microvascular disease ICD-10-CM: I67.89  ICD-9-CM: 437.8  7/26/2022 - Present        New onset seizure ([facility] ICD-10-CM: R56.9  ICD-9-CM: 780.39  7/25/2022 - Present        COVID-19 ICD-10-CM: U07.1  ICD-9-CM: 079.89  1/14/2022 - Present        Elevated troponin ICD-10-CM: R77.8  ICD-9-CM: 790.6  1/14/2022 - Present        Sciatica of right side ICD-10-CM: M54.31  ICD-9-CM: 724.3  5/23/2016 - Present        Essential hypertension ICD-10-CM: I10  ICD-9-CM: 401.9  5/7/2015 - Present        Hypercholesterolemia ICD-10-CM: E78.00  ICD-9-CM: 272.0 11/7/2011 - Present        Chronic pain ICD-10-CM: G89.29  ICD-9-CM: 338.29  Unknown - Present    Overview Signed 4/11/2011  9:37 AM by Sumanth Curry MD     mid back and left side             RESOLVED: Hypertension ICD-10-CM: I10  ICD-9-CM: 401.9  11/6/2014 - 5/7/2015           Greater than 60 minutes were spent with the patient on counseling and coordination of care    Signed:   Samuel Pickett MD  11/28/2022  4:35 PM

## 2022-11-28 NOTE — CONSULTS
Brief NIS Note    I was consulted for evaluation of patient's cerebral aneurysms and intracranial atherosclerotic disease (ICAD). Agree with dual anti-platelet therapy for ICAD. If patient fails medical management, endovascular treatment options may then be considered, however, these options would be high risk. Regarding her cerebral aneurysms, endovascular treatment would portend a high risk of stroke given her age and underlying ICAD. Typically, I would recommend imaging surveillance, and if any of the aneurysms were to enlarge, I would consider endovascular treatment. However, endovascular treatment would remain risky. I discussed all of the above with the patient's family, Dr. Anaya Andres, and Dr. Rafael Mckee. I advised that the patient follow up with me in 1 year for imaging surveillance. However, patient and/or her family have the prerogative to forego imaging surveillance altogether.

## 2022-11-30 LAB — LEVETIRACETAM SERPL-MCNC: <2 UG/ML (ref 10–40)

## 2023-06-22 ENCOUNTER — HOSPITAL ENCOUNTER (EMERGENCY)
Facility: HOSPITAL | Age: 88
Discharge: HOME | End: 2023-06-22
Attending: EMERGENCY MEDICINE
Payer: MEDICARE

## 2023-06-22 ENCOUNTER — HOSPICE ADMISSION (OUTPATIENT)
Age: 88
End: 2023-06-22
Payer: MEDICARE

## 2023-06-22 VITALS
SYSTOLIC BLOOD PRESSURE: 155 MMHG | RESPIRATION RATE: 17 BRPM | OXYGEN SATURATION: 99 % | DIASTOLIC BLOOD PRESSURE: 48 MMHG | TEMPERATURE: 98.6 F | HEART RATE: 88 BPM

## 2023-06-22 DIAGNOSIS — C50.912: ICD-10-CM

## 2023-06-22 DIAGNOSIS — R11.0 NAUSEA: Primary | ICD-10-CM

## 2023-06-22 LAB
ALBUMIN SERPL-MCNC: 3.5 G/DL (ref 3.5–5)
ALBUMIN/GLOB SERPL: 0.9 (ref 1.1–2.2)
ALP SERPL-CCNC: 81 U/L (ref 45–117)
ALT SERPL-CCNC: 15 U/L (ref 12–78)
ANION GAP SERPL CALC-SCNC: 6 MMOL/L (ref 5–15)
APPEARANCE UR: CLEAR
AST SERPL-CCNC: 13 U/L (ref 15–37)
BACTERIA URNS QL MICRO: NEGATIVE /HPF
BASOPHILS # BLD: 0 K/UL (ref 0–0.1)
BASOPHILS NFR BLD: 0 % (ref 0–1)
BILIRUB SERPL-MCNC: 0.6 MG/DL (ref 0.2–1)
BILIRUB UR QL: NEGATIVE
BUN SERPL-MCNC: 20 MG/DL (ref 6–20)
BUN/CREAT SERPL: 23 (ref 12–20)
CALCIUM SERPL-MCNC: 10 MG/DL (ref 8.5–10.1)
CHLORIDE SERPL-SCNC: 101 MMOL/L (ref 97–108)
CO2 SERPL-SCNC: 27 MMOL/L (ref 21–32)
COLOR UR: ABNORMAL
CREAT SERPL-MCNC: 0.86 MG/DL (ref 0.55–1.02)
DIFFERENTIAL METHOD BLD: ABNORMAL
EOSINOPHIL # BLD: 0 K/UL (ref 0–0.4)
EOSINOPHIL NFR BLD: 0 % (ref 0–7)
EPITH CASTS URNS QL MICRO: ABNORMAL /LPF
ERYTHROCYTE [DISTWIDTH] IN BLOOD BY AUTOMATED COUNT: 12.8 % (ref 11.5–14.5)
GLOBULIN SER CALC-MCNC: 4 G/DL (ref 2–4)
GLUCOSE SERPL-MCNC: 121 MG/DL (ref 65–100)
GLUCOSE UR STRIP.AUTO-MCNC: NEGATIVE MG/DL
HCT VFR BLD AUTO: 38.8 % (ref 35–47)
HGB BLD-MCNC: 12.8 G/DL (ref 11.5–16)
HGB UR QL STRIP: NEGATIVE
HYALINE CASTS URNS QL MICRO: ABNORMAL /LPF (ref 0–2)
IMM GRANULOCYTES # BLD AUTO: 0 K/UL (ref 0–0.04)
IMM GRANULOCYTES NFR BLD AUTO: 0 % (ref 0–0.5)
KETONES UR QL STRIP.AUTO: NEGATIVE MG/DL
LEUKOCYTE ESTERASE UR QL STRIP.AUTO: ABNORMAL
LYMPHOCYTES # BLD: 0.4 K/UL (ref 0.8–3.5)
LYMPHOCYTES NFR BLD: 3 % (ref 12–49)
MCH RBC QN AUTO: 30.3 PG (ref 26–34)
MCHC RBC AUTO-ENTMCNC: 33 G/DL (ref 30–36.5)
MCV RBC AUTO: 91.7 FL (ref 80–99)
MONOCYTES # BLD: 0.6 K/UL (ref 0–1)
MONOCYTES NFR BLD: 4 % (ref 5–13)
NEUTS SEG # BLD: 12.8 K/UL (ref 1.8–8)
NEUTS SEG NFR BLD: 93 % (ref 32–75)
NITRITE UR QL STRIP.AUTO: NEGATIVE
NRBC # BLD: 0 K/UL (ref 0–0.01)
NRBC BLD-RTO: 0 PER 100 WBC
PH UR STRIP: 5.5 (ref 5–8)
PLATELET # BLD AUTO: 193 K/UL (ref 150–400)
PMV BLD AUTO: 11.1 FL (ref 8.9–12.9)
POTASSIUM SERPL-SCNC: 4.5 MMOL/L (ref 3.5–5.1)
PROT SERPL-MCNC: 7.5 G/DL (ref 6.4–8.2)
PROT UR STRIP-MCNC: NEGATIVE MG/DL
RBC # BLD AUTO: 4.23 M/UL (ref 3.8–5.2)
RBC #/AREA URNS HPF: ABNORMAL /HPF (ref 0–5)
RBC MORPH BLD: ABNORMAL
SARS-COV-2 RDRP RESP QL NAA+PROBE: NOT DETECTED
SODIUM SERPL-SCNC: 134 MMOL/L (ref 136–145)
SOURCE: NORMAL
SP GR UR REFRACTOMETRY: 1.02
URINE CULTURE IF INDICATED: ABNORMAL
UROBILINOGEN UR QL STRIP.AUTO: 0.2 EU/DL (ref 0.2–1)
WBC # BLD AUTO: 13.8 K/UL (ref 3.6–11)
WBC URNS QL MICRO: ABNORMAL /HPF (ref 0–4)

## 2023-06-22 PROCEDURE — 6360000002 HC RX W HCPCS: Performed by: PHYSICIAN ASSISTANT

## 2023-06-22 PROCEDURE — 96376 TX/PRO/DX INJ SAME DRUG ADON: CPT

## 2023-06-22 PROCEDURE — 85025 COMPLETE CBC W/AUTO DIFF WBC: CPT

## 2023-06-22 PROCEDURE — 36415 COLL VENOUS BLD VENIPUNCTURE: CPT

## 2023-06-22 PROCEDURE — 96374 THER/PROPH/DIAG INJ IV PUSH: CPT

## 2023-06-22 PROCEDURE — 6370000000 HC RX 637 (ALT 250 FOR IP)

## 2023-06-22 PROCEDURE — 6360000002 HC RX W HCPCS

## 2023-06-22 PROCEDURE — 81001 URINALYSIS AUTO W/SCOPE: CPT

## 2023-06-22 PROCEDURE — 87635 SARS-COV-2 COVID-19 AMP PRB: CPT

## 2023-06-22 PROCEDURE — 99284 EMERGENCY DEPT VISIT MOD MDM: CPT

## 2023-06-22 PROCEDURE — 96375 TX/PRO/DX INJ NEW DRUG ADDON: CPT

## 2023-06-22 PROCEDURE — 80053 COMPREHEN METABOLIC PANEL: CPT

## 2023-06-22 RX ORDER — BUTALBITAL, ACETAMINOPHEN AND CAFFEINE 50; 325; 40 MG/1; MG/1; MG/1
1 TABLET ORAL
Status: COMPLETED | OUTPATIENT
Start: 2023-06-22 | End: 2023-06-22

## 2023-06-22 RX ORDER — PROMETHAZINE HYDROCHLORIDE 25 MG/1
25 TABLET ORAL 3 TIMES DAILY PRN
Qty: 12 TABLET | Refills: 0 | Status: SHIPPED | OUTPATIENT
Start: 2023-06-22 | End: 2023-06-25

## 2023-06-22 RX ORDER — DEXAMETHASONE SODIUM PHOSPHATE 10 MG/ML
10 INJECTION, SOLUTION INTRAMUSCULAR; INTRAVENOUS ONCE
Status: COMPLETED | OUTPATIENT
Start: 2023-06-22 | End: 2023-06-22

## 2023-06-22 RX ORDER — PROMETHAZINE HYDROCHLORIDE 25 MG/1
25 TABLET ORAL
Status: DISCONTINUED | OUTPATIENT
Start: 2023-06-22 | End: 2023-06-22

## 2023-06-22 RX ORDER — KETOROLAC TROMETHAMINE 30 MG/ML
15 INJECTION, SOLUTION INTRAMUSCULAR; INTRAVENOUS
Status: COMPLETED | OUTPATIENT
Start: 2023-06-22 | End: 2023-06-22

## 2023-06-22 RX ORDER — PROCHLORPERAZINE EDISYLATE 5 MG/ML
10 INJECTION INTRAMUSCULAR; INTRAVENOUS ONCE
Status: COMPLETED | OUTPATIENT
Start: 2023-06-22 | End: 2023-06-22

## 2023-06-22 RX ORDER — PROCHLORPERAZINE EDISYLATE 5 MG/ML
5 INJECTION INTRAMUSCULAR; INTRAVENOUS ONCE
Status: COMPLETED | OUTPATIENT
Start: 2023-06-22 | End: 2023-06-22

## 2023-06-22 RX ORDER — DROPERIDOL 2.5 MG/ML
0.62 INJECTION, SOLUTION INTRAMUSCULAR; INTRAVENOUS ONCE
Status: COMPLETED | OUTPATIENT
Start: 2023-06-22 | End: 2023-06-22

## 2023-06-22 RX ADMIN — DEXAMETHASONE SODIUM PHOSPHATE 10 MG: 10 INJECTION, SOLUTION INTRAMUSCULAR; INTRAVENOUS at 16:08

## 2023-06-22 RX ADMIN — KETOROLAC TROMETHAMINE 15 MG: 30 INJECTION, SOLUTION INTRAMUSCULAR; INTRAVENOUS at 14:45

## 2023-06-22 RX ADMIN — PROCHLORPERAZINE EDISYLATE 5 MG: 5 INJECTION INTRAMUSCULAR; INTRAVENOUS at 18:26

## 2023-06-22 RX ADMIN — DROPERIDOL 0.62 MG: 2.5 INJECTION, SOLUTION INTRAMUSCULAR; INTRAVENOUS at 16:08

## 2023-06-22 RX ADMIN — BUTALBITAL, ACETAMINOPHEN, AND CAFFEINE 1 TABLET: 50; 325; 40 TABLET ORAL at 14:47

## 2023-06-22 RX ADMIN — PROCHLORPERAZINE EDISYLATE 10 MG: 5 INJECTION, SOLUTION INTRAMUSCULAR; INTRAVENOUS at 14:46

## 2023-06-22 NOTE — ED PROVIDER NOTES
[facility] EMERGENCY DEPT  EMERGENCY DEPARTMENT ENCOUNTER       Pt Name: Arminda Payne  MRN: [id number]  [place] 1/18/1932  Date of Evaluation: 6/22/2023  Provider: JEFFREY Varner - ANNETTE   PCP: Kehinde Serrano MD  Note Started: 10:33 PM 6/22/23     CHIEF COMPLAINT       Chief Complaint   Patient presents with    Nausea     Patient arrives via EMS c/o nausea and vomiting since last night. Patient resides at [place] [address]. Current hx of metastatic breast cancer and has chosen to not receive treatment. HISTORY OF PRESENT ILLNESS: 1 or more elements      History From: Patient and Patient's Daughter  HPI Limitations None     Arminda Panye is a 80 y.o. female with current stage IV left breast cancer who presents to the ED today for complaints of nausea and moderate headache x1 day. Patient vomited once last night and took Zofran p.o. this morning. Denies fevers, cough, shortness of breath, abdominal pain, mental status changes. Patient has been having nausea, diarrhea, loss of appetite, and fatigue. Does not want a lot of testing done. Concerned about possible COVID, since patient had visitors recently at the [facility] [address] [place] [address]. Patient has declined all treatment and further testing of breast cancer to assess metastasis - does not know where it has spread to. Patient does not have oncology, hospice, or palliative following her. Nursing Notes were all reviewed and agreed with or any disagreements were addressed in the HPI. REVIEW OF SYSTEMS      Review of Systems     Positives and Pertinent negatives as per HPI.     PAST HISTORY     Past Medical History:  Past Medical History:   Diagnosis Date    Chronic pain     mid back and left side    Hypercholesterolemia 11/7/2011    Hypertension 11/6/2014    Malignant neoplasm of left breast, stage 4 ([facility] 11/22/2022       Past Surgical History:  Past Surgical History:   Procedure Laterality Date    ORTHOPEDIC SURGERY      OTHER

## 2023-06-22 NOTE — PROGRESS NOTES
[facility]  [phone]     Patient Name: Dena Robb  YOB: 1932  Age: 80 y.o. [facility] RN Note:  Hospice consult received, reviewing chart. Will follow up with Unit Nurse and Care Manager to discuss plan of care, patient status and discharge disposition within the hour. Met with family and patient at bedside. Patient came in with nausea, it is better with the medicine. Patient is returning to [facility] at [address]. We are planning to admit tomorrow. Patient will need new meds from ED sent to [place] [address] for delivery tonight. Thank you for the opportunity to be of service to this patient.    Simeon Bee RN  [phone]

## 2023-06-22 NOTE — CARE COORDINATION
5:51 PM  CM updated by Hospice RN that pt would like to admit to hospice services with [facility]. [facility] will admit on tomorrow. Pt to d/c back to [facility] and have hospice admission there on tomorrow. Pt resides in senior living at [place] [address]. CM notified ED PA that pt will need scripts sent to [address] [place] [address] as this is medication provider for facility. Pt/family at bedside updated. Initial note:  CM acknowledges consult to hospice. CM has staffed case with ED PA and placed hospice consult to [facility] via [email or web address]. Discussed pt with hospice liaison who is aware of new consult. Hospice liaison to meet with pt/family and advise further on next steps in care. CM remains available to assist. Per PA, pt is anticipated to be discharged from the ED.       Sindhu Knight [address], [facility]  [phone]/Available on [employer]

## 2023-06-22 NOTE — ED NOTES
DC info reviewed with patient, all questions answered. Patient well-appearing at time of discharge and vital signs stable. Ambulatory out of ED at this time.        Sabrina Sepulveda RN  06/22/23 [phone]

## 2023-06-23 ENCOUNTER — HOME CARE VISIT (OUTPATIENT)
Age: 88
End: 2023-06-23
Payer: MEDICARE

## 2023-06-23 VITALS
DIASTOLIC BLOOD PRESSURE: 73 MMHG | SYSTOLIC BLOOD PRESSURE: 133 MMHG | OXYGEN SATURATION: 97 % | HEART RATE: 66 BPM | RESPIRATION RATE: 20 BRPM

## 2023-06-23 PROCEDURE — 0651 HSPC ROUTINE HOME CARE

## 2023-06-23 PROCEDURE — G0299 HHS/HOSPICE OF RN EA 15 MIN: HCPCS

## 2023-06-23 PROCEDURE — 2500000001 HSPC NON INJECTABLE MED

## 2023-06-23 ASSESSMENT — ENCOUNTER SYMPTOMS
DYSPNEA ACTIVITY LEVEL: AFTER AMBULATING LESS THAN 10 FT
SKIN LESIONS: 1
TROUBLE SWALLOWING: 1
PAIN LOCATION - PAIN QUALITY: BURN
ORTHOPNEA: 1

## 2023-06-24 ENCOUNTER — HOME CARE VISIT (OUTPATIENT)
Age: 88
End: 2023-06-24
Payer: MEDICARE

## 2023-06-24 VITALS
HEART RATE: 62 BPM | RESPIRATION RATE: 16 BRPM | DIASTOLIC BLOOD PRESSURE: 70 MMHG | OXYGEN SATURATION: 98 % | SYSTOLIC BLOOD PRESSURE: 126 MMHG

## 2023-06-24 PROCEDURE — 0651 HSPC ROUTINE HOME CARE

## 2023-06-24 PROCEDURE — G0299 HHS/HOSPICE OF RN EA 15 MIN: HCPCS

## 2023-06-24 NOTE — HOSPICE
[facility]  [phone]  Patient Name: Unknown Agnes   YOB: 1932  Age: 80                                                         Principle Hospice Diagnosis: Metastatic Breast Cancer     Diagnoses RELATED to the terminal prognosis:   Chronic Pain  Shortness of Breath    Unrelated Diagnosis:   Severe Scoliosis  Cerebral Aneurysms  Intracranial Atherosclerotic disease  Seizures  Altered Mental Status  Hypertension  Mild Carotid Stenosis   Gastroesophageal Reflux Disease     Date of Hospice Admission: 2023  Hospice Attending Elected by Patient: Dr. George Cazares   Primary Care Physician: Dr Rooney Adjutant RN: Jerad Hughes, RN  Nurse CM: Lissette   : Amelia Villanueva   : Misael Tatum DNR: Yes   Service: No   Home: TBD    Direct Observation:      Patient received sitting up in recliner with daughter Isac Cooley) close by. Patent is alert and oriented x 4. Sitting with a plate in her lap that is holding a cookie and a ginger ale. Patient is sharp, pleasant, and denies any pain or shortness of breath at this time. Educated the daughter and patient on hospice philosophy, medications, DNR status, and diseases prognosis. Patient also wa advised of the differnces between [facility] in network program vs out of network program. Patient is clear in what she wants and she would like to focus on quality of life. /73, HR 66, RR 20, SpO2 97 on room air, afebrile. 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 She states she has no problem using them she is just a little slow and her handwriting is a little messier. Facial nerves intact, no deficit. Sensation is present in upper and lower extremities. Strength is equal BUE and BLE. Patient states she will have sciatic pain that runs down below her knees if she stands too long. Patient has a history of seizures.   Lungs are diminished in the upper lobes, and fine crackles in

## 2023-06-24 NOTE — HOSPICE
Ronny Hurtado   (1/18/1932)    Follow up visit from admission at [facility]. Patient received sitting up in chair. Bed has been delivered but is unmade. Med tech said she would make bed. Patient complaint it was difficult to swallow two pills today. One was a capsule the other was a larger pill. Order given that medications can be crushed and given with apple sauce. Patient able to swallow water with no difficulty or coughing. She can swallow food with difficulty or coughing. VS WNL. Lung's sound diminished in upper lobes but more clear this morning than yesterday. No edema. Patient denies pain or SOB. Medications reviewed with facility. Bisacodyl was missing, all other medications physically accounted for in medication cart. Med Tech reordered Bisacodyl. Help set patient up for lunch. [facility] is here 24/7 to the staff if there were any questions or concerns.

## 2023-06-25 PROCEDURE — 0651 HSPC ROUTINE HOME CARE

## 2023-06-26 ENCOUNTER — HOME CARE VISIT (OUTPATIENT)
Facility: HOME HEALTH | Age: 88
End: 2023-06-26
Payer: MEDICARE

## 2023-06-26 VITALS — TEMPERATURE: 98 F

## 2023-06-26 PROCEDURE — G0299 HHS/HOSPICE OF RN EA 15 MIN: HCPCS

## 2023-06-26 PROCEDURE — G0155 HHCP-SVS OF CSW,EA 15 MIN: HCPCS

## 2023-06-26 PROCEDURE — 0651 HSPC ROUTINE HOME CARE

## 2023-06-26 PROCEDURE — G0156 HHCP-SVS OF AIDE,EA 15 MIN: HCPCS

## 2023-06-26 ASSESSMENT — ENCOUNTER SYMPTOMS: CONSTIPATION: 1

## 2023-06-27 ENCOUNTER — HOME CARE VISIT (OUTPATIENT)
Age: 88
End: 2023-06-27
Payer: MEDICARE

## 2023-06-27 PROCEDURE — 0651 HSPC ROUTINE HOME CARE

## 2023-06-28 PROCEDURE — 0651 HSPC ROUTINE HOME CARE

## 2023-06-29 ENCOUNTER — HOME CARE VISIT (OUTPATIENT)
Facility: HOME HEALTH | Age: 88
End: 2023-06-29
Payer: MEDICARE

## 2023-06-29 PROCEDURE — G0156 HHCP-SVS OF AIDE,EA 15 MIN: HCPCS

## 2023-06-29 PROCEDURE — 0651 HSPC ROUTINE HOME CARE

## 2023-06-30 ENCOUNTER — HOME CARE VISIT (OUTPATIENT)
Facility: HOME HEALTH | Age: 88
End: 2023-06-30
Payer: MEDICARE

## 2023-06-30 PROCEDURE — 0651 HSPC ROUTINE HOME CARE

## 2023-06-30 PROCEDURE — G0299 HHS/HOSPICE OF RN EA 15 MIN: HCPCS

## 2023-07-01 PROCEDURE — 0651 HSPC ROUTINE HOME CARE

## 2023-07-02 VITALS
SYSTOLIC BLOOD PRESSURE: 123 MMHG | TEMPERATURE: 97.8 F | OXYGEN SATURATION: 99 % | HEART RATE: 79 BPM | DIASTOLIC BLOOD PRESSURE: 54 MMHG | RESPIRATION RATE: 20 BRPM

## 2023-07-02 PROCEDURE — 0651 HSPC ROUTINE HOME CARE

## 2023-07-02 ASSESSMENT — ENCOUNTER SYMPTOMS
DYSPNEA ACTIVITY LEVEL: AT REST
SKIN LESIONS: 1
ORTHOPNEA: 1

## 2023-07-03 ENCOUNTER — HOME CARE VISIT (OUTPATIENT)
Age: 88
End: 2023-07-03
Payer: MEDICARE

## 2023-07-03 VITALS — HEART RATE: 78 BPM | DIASTOLIC BLOOD PRESSURE: 75 MMHG | RESPIRATION RATE: 18 BRPM | SYSTOLIC BLOOD PRESSURE: 115 MMHG

## 2023-07-03 PROCEDURE — G0299 HHS/HOSPICE OF RN EA 15 MIN: HCPCS

## 2023-07-03 NOTE — HOSPICE
therapy referral and shared about the pt's love for traditional hymns. As she led this MT further into the apartment, MT observed the pt alert, sitting in her chair. She appeared to be in a pleasent mood as she smiled and greeted this MT. MT sat at chairside, and introduced self/role. MT further engaged with the pt and asked her questions about her music/patel background and support system. Pt responded to these, and noted enjoying various traditional hymns. She also shared her love of singing, though its difficult to sing now, and how supportive her children have been at this time. MT provided active listening and words of validation as she shared, and asked how she was feeling. Pt noted the pain she experiences when she stands/walks around, but expressed relief knowing the pain eases once she sits down. MT offered song selections throughout the session to validate her patel. Pt increased self-expression in response to this as evidenced by (AEB) singing along and harmonizing at times as well. She expressed gratitude for the visit and stated \"you have made my day\". MT thanked her for her openness and concluded the visit at this time.      ABDI CervantesBC (Music Therapist, Board Certified)  [id number] [address]

## 2023-07-05 ENCOUNTER — HOME CARE VISIT (OUTPATIENT)
Age: 88
End: 2023-07-05
Payer: MEDICARE

## 2023-07-06 ENCOUNTER — HOME CARE VISIT (OUTPATIENT)
Facility: HOME HEALTH | Age: 88
End: 2023-07-06
Payer: MEDICARE

## 2023-07-06 PROCEDURE — G0156 HHCP-SVS OF AIDE,EA 15 MIN: HCPCS

## 2023-07-10 ENCOUNTER — HOME CARE VISIT (OUTPATIENT)
Facility: HOME HEALTH | Age: 88
End: 2023-07-10
Payer: MEDICARE

## 2023-07-10 VITALS
RESPIRATION RATE: 18 BRPM | OXYGEN SATURATION: 98 % | TEMPERATURE: 98.6 F | DIASTOLIC BLOOD PRESSURE: 72 MMHG | SYSTOLIC BLOOD PRESSURE: 122 MMHG | HEART RATE: 62 BPM

## 2023-07-10 PROCEDURE — G0156 HHCP-SVS OF AIDE,EA 15 MIN: HCPCS

## 2023-07-10 PROCEDURE — G0299 HHS/HOSPICE OF RN EA 15 MIN: HCPCS

## 2023-07-10 NOTE — HOSPICE
Patient waiting for daughter upon arrival. Respirations were unlabored. No nonverbal signs of pain, agitation or shortness of breath. Lung were clear. Bowel sounds are active. Heart rate is regular with +1 edema and pulses palpable. Skin well moisturized. Wound care performed. No signs of infection. Abdomen soft and nontender. Told to call hospice with questions concerns and changes in status. Family updated.

## 2023-07-11 NOTE — HOSPICE
Patient did not was bed bath and did not want to have assistance with shower without a shower chair. Pt. said she spoke with Lissette about getting a shower chair. Pt and Pts daughter stated patient would get a shower by facility staff on Tuesday 7/11.

## 2023-07-13 ENCOUNTER — HOME CARE VISIT (OUTPATIENT)
Facility: HOME HEALTH | Age: 88
End: 2023-07-13
Payer: MEDICARE

## 2023-07-13 PROCEDURE — G0156 HHCP-SVS OF AIDE,EA 15 MIN: HCPCS

## 2023-07-14 ENCOUNTER — HOME CARE VISIT (OUTPATIENT)
Facility: HOME HEALTH | Age: 88
End: 2023-07-14
Payer: MEDICARE

## 2023-07-14 PROCEDURE — G0299 HHS/HOSPICE OF RN EA 15 MIN: HCPCS

## 2023-07-15 NOTE — HOSPICE
Vist made for wound care. Scant amount of yellow drainage noted. No signs of infection or odor. Tolerated well. Oriented to person place and time. Pleasant and cooperative and was visiting with son who had provided a new phone and was setting it up for her. She had some flatuence  but son stated she had been eating a great deal of beans the past few days so she is going to skip beans and see how she feels. She is having daily regular bowel movements and no discomfort, shortness of breath or agitation noted.  Told to call hospice with questions concerns or changes in status

## 2023-07-16 ENCOUNTER — HOME CARE VISIT (OUTPATIENT)
Age: 88
End: 2023-07-16
Payer: MEDICARE

## 2023-07-17 ENCOUNTER — HOME CARE VISIT (OUTPATIENT)
Facility: HOME HEALTH | Age: 88
End: 2023-07-17
Payer: MEDICARE

## 2023-07-17 VITALS
RESPIRATION RATE: 16 BRPM | TEMPERATURE: 98 F | SYSTOLIC BLOOD PRESSURE: 130 MMHG | OXYGEN SATURATION: 97 % | DIASTOLIC BLOOD PRESSURE: 78 MMHG | HEART RATE: 74 BPM

## 2023-07-17 PROCEDURE — G0299 HHS/HOSPICE OF RN EA 15 MIN: HCPCS

## 2023-07-17 PROCEDURE — G0155 HHCP-SVS OF CSW,EA 15 MIN: HCPCS

## 2023-07-17 PROCEDURE — G0156 HHCP-SVS OF AIDE,EA 15 MIN: HCPCS

## 2023-07-18 NOTE — HOSPICE
LMSW made visit for socialization and support. Upon arrival, patient was visiting with her son Ruddy Arriaga and daughter in law. Patient shared that she had episodes of vomiting over the weekend. Patient stated that she was feeling better now and was eating a baked potato from [employer] that her son brought her. Patient expressed appreciation for hospice team. Son Ruddy Arriaga remarked on how patient is still able to remember every birthday and anniversary for her 39 member family. LMSW provided validation. LMSW followed up with facility staff who reported no additional concerns. LMSW followed up with ADIS Mclaughlin who had addressed patient needs earlier today. LMSW will continue to offer support and will remain available to address needs that arise.

## 2023-07-18 NOTE — HOSPICE
Patient in restroom upon arrival. Respirations were unlabored. No nonverbal signs of pain, agitation or shortness of breath. Lung were clear. Bowel sounds are active. Heart rate is regular with +1 edema to lower extremities and pulses palpable. Skin well moisturized. Wound care performed. No signs of infection. Had loose stools over weekend and imodium was ordered per Guero Tobar NP. Eye treatments were changed to PRN as patient often refuses them. Nausea and vomiting have subsided. Patient had been taking an antibiotic for a uti and feels this may have upset her stomach Saturday. Abdomen soft and nontender. Told to call hospice with questions concerns and changes in status. Family updated.

## 2023-07-19 ENCOUNTER — HOME CARE VISIT (OUTPATIENT)
Age: 88
End: 2023-07-19
Payer: MEDICARE

## 2023-07-19 NOTE — HOSPICE
This  conducted routine spiritual care visit to patient in her assisted living apartment. The patient was seated in a chair, awake and alert, reporting some burning symptoms in her eyes that was being treated with drops. Otherwise the patient expressed feeling better today than over the weekend.  engaged with patient in reflection upon her patel and family, played a favorite hymn to patient, and engaged her in prayer.

## 2023-07-20 ENCOUNTER — HOME CARE VISIT (OUTPATIENT)
Facility: HOME HEALTH | Age: 88
End: 2023-07-20
Payer: MEDICARE

## 2023-07-20 PROCEDURE — G0156 HHCP-SVS OF AIDE,EA 15 MIN: HCPCS

## 2023-07-21 ENCOUNTER — HOME CARE VISIT (OUTPATIENT)
Age: 88
End: 2023-07-21
Payer: MEDICARE

## 2023-07-21 ENCOUNTER — HOME CARE VISIT (OUTPATIENT)
Facility: HOME HEALTH | Age: 88
End: 2023-07-21
Payer: MEDICARE

## 2023-07-21 VITALS
TEMPERATURE: 97.7 F | HEART RATE: 62 BPM | SYSTOLIC BLOOD PRESSURE: 130 MMHG | RESPIRATION RATE: 18 BRPM | DIASTOLIC BLOOD PRESSURE: 62 MMHG

## 2023-07-21 PROCEDURE — G0299 HHS/HOSPICE OF RN EA 15 MIN: HCPCS

## 2023-07-21 PROCEDURE — G0155 HHCP-SVS OF CSW,EA 15 MIN: HCPCS

## 2023-07-21 ASSESSMENT — ENCOUNTER SYMPTOMS: CONSTIPATION: 1

## 2023-07-22 NOTE — HOSPICE
Patient sitting in chair upon arrival. Respirations were unlabored. No nonverbal signs of agitation or shortness of breath. Lung were clear. Bowel sounds are active. Heart rate is regular with +1  edema and pulses palpable. Skin well moisturized. Wound care performed. No signs of infection. Complained of anxiety and generalized aches. Staff were giving her prn lorazepam and tylenol. She stated her whole day had been a disappointment because she didn't get her oatmeal at breakfast. Abdomen soft and nontender. Told to call hospice with questions concerns and changes in status. Family updated.

## 2023-07-24 ENCOUNTER — HOME CARE VISIT (OUTPATIENT)
Age: 88
End: 2023-07-24
Payer: MEDICARE

## 2023-07-24 VITALS
TEMPERATURE: 97.7 F | DIASTOLIC BLOOD PRESSURE: 62 MMHG | RESPIRATION RATE: 18 BRPM | SYSTOLIC BLOOD PRESSURE: 130 MMHG | HEART RATE: 62 BPM

## 2023-07-24 PROCEDURE — G0299 HHS/HOSPICE OF RN EA 15 MIN: HCPCS

## 2023-07-24 PROCEDURE — G0155 HHCP-SVS OF CSW,EA 15 MIN: HCPCS

## 2023-07-24 ASSESSMENT — ENCOUNTER SYMPTOMS: CONSTIPATION: 1

## 2023-07-24 NOTE — HOSPICE
Patient having anxiety upon arrival because family had not brought her lunch yet. Also anxious about not getting her prn  eye drops/warm compresses quickly. States she is overall very anxious. Eye drops and compresses scheduled for each morning  and Zoloft 25 mg q hs ordered to help with anxiety per Dian Timmons NP. She states she will not refuse them if they are once daily. Respirations were unlabored. No nonverbal signs of pain, or shortness of breath. Lung were clear. Bowel sounds are active. Heart rate is regular with +1 edema and pulses palpable. Skin well moisturized. Wound care performed. No signs of infection noted. Abdomen soft and nontender. Told to call hospice with questions concerns and changes in status. Family updated. Long conversation perfomred with primary caregiver. He will contiact SW today.

## 2023-07-25 ENCOUNTER — HOME CARE VISIT (OUTPATIENT)
Age: 88
End: 2023-07-25
Payer: MEDICARE

## 2023-07-25 ENCOUNTER — HOME CARE VISIT (OUTPATIENT)
Facility: HOME HEALTH | Age: 88
End: 2023-07-25
Payer: MEDICARE

## 2023-07-27 ENCOUNTER — HOME CARE VISIT (OUTPATIENT)
Facility: HOME HEALTH | Age: 88
End: 2023-07-27
Payer: MEDICARE

## 2023-07-28 ENCOUNTER — HOME CARE VISIT (OUTPATIENT)
Facility: HOME HEALTH | Age: 88
End: 2023-07-28
Payer: MEDICARE

## 2023-07-28 PROCEDURE — G0299 HHS/HOSPICE OF RN EA 15 MIN: HCPCS

## 2023-07-30 VITALS
OXYGEN SATURATION: 97 % | SYSTOLIC BLOOD PRESSURE: 137 MMHG | TEMPERATURE: 98.7 F | RESPIRATION RATE: 16 BRPM | HEART RATE: 61 BPM | DIASTOLIC BLOOD PRESSURE: 58 MMHG

## 2023-07-30 ASSESSMENT — ENCOUNTER SYMPTOMS
STOOL DESCRIPTION: DIARRHEA
DIARRHEA: 1

## 2023-07-30 NOTE — HOSPICE
Routine hospice SN assessment visit  Patient in her apartment sitting in chair  Awake and alert  Denies having any pain or shortness of breath  Skin is warm and dry  Breath sounds clear  Patient is alert and oriented x 4 , pleasant and engaging  Reports having some diarrhea today x 1  Informed patient to inform staff if continues to have loose stools  Understanding verbalized   Wound care rendered to breast per wound care instructions  Patient tolerated well   Patient continues to ambulate around her apartment with walker   Intake is fair  Asked for RN to fix her cottage cheese as she could not open the container   Staff reports no new issues since last hospice SN visit  To continue with current hospice plan of care  Informed staff to call with concerns / needs   Understanding verbalized  No medication refills or supplies needed this visit

## 2023-07-31 ENCOUNTER — HOME CARE VISIT (OUTPATIENT)
Facility: HOME HEALTH | Age: 88
End: 2023-07-31
Payer: MEDICARE

## 2023-07-31 VITALS
OXYGEN SATURATION: 96 % | RESPIRATION RATE: 14 BRPM | DIASTOLIC BLOOD PRESSURE: 72 MMHG | SYSTOLIC BLOOD PRESSURE: 130 MMHG | HEART RATE: 68 BPM | TEMPERATURE: 44.6 F

## 2023-07-31 PROCEDURE — G0299 HHS/HOSPICE OF RN EA 15 MIN: HCPCS

## 2023-07-31 NOTE — HOSPICE
Patient was eating breakfast upon arrival. Respirations were unlabored. No nonverbal signs of pain, agitation or shortness of breath. Lung were clear. Bowel sounds are active. Heart rate is regular with + 1 edema and pulses palpable. Skin well moisturized. Wound care performed. No signs of infection. Abdomen soft and nontender. Would prefer that our aide stop visiting as she likes to have her shower by [facility] Staff. Triage informed and visit sets discontinued. Stated she has been sleeping better since she started Zoloft. Told to call hospice with questions concerns and changes in status. Family updated.

## 2023-08-01 ENCOUNTER — HOME CARE VISIT (OUTPATIENT)
Age: 88
End: 2023-08-01
Payer: MEDICARE

## 2023-08-03 ENCOUNTER — HOME CARE VISIT (OUTPATIENT)
Facility: HOME HEALTH | Age: 88
End: 2023-08-03
Payer: MEDICARE

## 2023-08-03 PROCEDURE — G0155 HHCP-SVS OF CSW,EA 15 MIN: HCPCS

## 2023-08-04 ENCOUNTER — HOME CARE VISIT (OUTPATIENT)
Facility: HOME HEALTH | Age: 88
End: 2023-08-04
Payer: MEDICARE

## 2023-08-04 VITALS
RESPIRATION RATE: 14 BRPM | TEMPERATURE: 98 F | HEART RATE: 66 BPM | SYSTOLIC BLOOD PRESSURE: 122 MMHG | DIASTOLIC BLOOD PRESSURE: 66 MMHG

## 2023-08-04 PROCEDURE — G0299 HHS/HOSPICE OF RN EA 15 MIN: HCPCS

## 2023-08-04 NOTE — HOSPICE
Patient visiting with grandaughterarrival. Respirations were unlabored. No nonverbal signs of pain, agitation or shortness of breath. Lung were clear. Bowel sounds are active. Heart rate is regular with +1 edema and pulses palpable. Skin well moisturized. Wound care performed. NO signs of infection. Abdomen soft and nontender. No signs of infection. Pain noted for 1 hour last night and educatied on pain mediction and patient voiced understanding. She has not experienced pain in her breast until this time. Told to call hospice with questions concerns and changes in status. Family updated.

## 2023-08-04 NOTE — HOSPICE
LMSW met with patient along with daughter Michaela Buerger and . Patient shared that she is experiencing some constipation. When asked, patient shared she feels well supported at this time and denied need for additional support. Daughter expressed appreciation for hospice support. No obvious signs of anxiety at time of visit. LMSW followed up with facility staff who reported no new needs or concerns. LMSW followed up with ADIS Mclaughlin regarding constipation. LMSW will continue to offer support and will remain available to address needs that arise.

## 2023-08-08 ENCOUNTER — HOME CARE VISIT (OUTPATIENT)
Facility: HOME HEALTH | Age: 88
End: 2023-08-08
Payer: MEDICARE

## 2023-08-08 PROCEDURE — G0299 HHS/HOSPICE OF RN EA 15 MIN: HCPCS

## 2023-08-09 ENCOUNTER — HOME CARE VISIT (OUTPATIENT)
Age: 88
End: 2023-08-09
Payer: MEDICARE

## 2023-08-09 VITALS
SYSTOLIC BLOOD PRESSURE: 82 MMHG | HEART RATE: 73 BPM | TEMPERATURE: 98.7 F | RESPIRATION RATE: 20 BRPM | DIASTOLIC BLOOD PRESSURE: 43 MMHG | OXYGEN SATURATION: 95 %

## 2023-08-09 ASSESSMENT — ENCOUNTER SYMPTOMS
CONSTIPATION: 1
PAIN LOCATION - PAIN QUALITY: DULL

## 2023-08-09 NOTE — HOSPICE
This  conducted routine spiritual care visit to patient, encountering patient in her facility room, complaining of headache and bowel problems.  notified facility nurse who administered medication.  reported bowel problems to hospice nurse via email.  engaged with patient in spiritual review and reflection on the spiritual beliefs that bring her comfort and hope.  sang favorite hymns with patient and engaged with patient in prayer for her concerns. Patient's granddaughter arrived and  provided active listening and affirmation of patient's connections with her loved ones.
given to security

## 2023-08-09 NOTE — HOSPICE
Routine hospice SN assessment visit  Patient receivedsitting in chair in her apartment  Awake and alert  Engaging  Reports that she has pain in her neck when she turns her head , rates 5/10  She states she has taken Hydrocodone 5/325 mg 1 tab which has statrted to help  Skin is warm and dry  Color marked pallor  Appears fatigued   Oriented x 4  Pleasant and cooperative during assessment  Wound performed to left breast  Some mild bleeding after cleansing area with wound cleanser  Foam dressing applied  No new issues since last hospice SN visit   Granddaughter in to visit patient during RN visit  To continue with current hospice plan of care  Informed staff to call hospice with concerns / needs Understanding verbalized

## 2023-08-11 ENCOUNTER — HOME CARE VISIT (OUTPATIENT)
Facility: HOME HEALTH | Age: 88
End: 2023-08-11
Payer: MEDICARE

## 2023-08-11 PROCEDURE — G0299 HHS/HOSPICE OF RN EA 15 MIN: HCPCS

## 2023-08-13 VITALS
RESPIRATION RATE: 20 BRPM | HEART RATE: 68 BPM | DIASTOLIC BLOOD PRESSURE: 47 MMHG | TEMPERATURE: 98.3 F | OXYGEN SATURATION: 96 % | SYSTOLIC BLOOD PRESSURE: 118 MMHG

## 2023-08-13 ASSESSMENT — ENCOUNTER SYMPTOMS
PAIN LOCATION - PAIN QUALITY: ACHING
CONSTIPATION: 1

## 2023-08-14 ENCOUNTER — HOME CARE VISIT (OUTPATIENT)
Facility: HOME HEALTH | Age: 88
End: 2023-08-14
Payer: MEDICARE

## 2023-08-14 VITALS
RESPIRATION RATE: 14 BRPM | SYSTOLIC BLOOD PRESSURE: 130 MMHG | HEART RATE: 88 BPM | TEMPERATURE: 98.2 F | DIASTOLIC BLOOD PRESSURE: 74 MMHG

## 2023-08-14 PROCEDURE — G0299 HHS/HOSPICE OF RN EA 15 MIN: HCPCS

## 2023-08-14 NOTE — HOSPICE
Patient eating a hot dog upon arrival. Respirations were unlabored. No nonverbal signs of pain, agitation or shortness of breath. Lung were clear. Bowel sounds are active. Heart rate is regular with +1 ankle edema and pulses palpable. Skin well moisturized. Wound care performed. Abdomen soft and nontender. No signs of infection. Told to call hospice with questions concerns and changes in status. Family updated. Stating she has less energy and required pain medication on Saturday for generalized pain that included left breast pain.

## 2023-08-16 ENCOUNTER — HOME CARE VISIT (OUTPATIENT)
Age: 88
End: 2023-08-16
Payer: MEDICARE

## 2023-08-18 ENCOUNTER — HOME CARE VISIT (OUTPATIENT)
Facility: HOME HEALTH | Age: 88
End: 2023-08-18
Payer: MEDICARE

## 2023-08-18 PROCEDURE — G0299 HHS/HOSPICE OF RN EA 15 MIN: HCPCS

## 2023-08-20 ASSESSMENT — ENCOUNTER SYMPTOMS
PAIN LOCATION - PAIN QUALITY: ACHING
CONSTIPATION: 1

## 2023-08-21 ENCOUNTER — HOME CARE VISIT (OUTPATIENT)
Facility: HOME HEALTH | Age: 88
End: 2023-08-21
Payer: MEDICARE

## 2023-08-21 VITALS
OXYGEN SATURATION: 99 % | RESPIRATION RATE: 12 BRPM | TEMPERATURE: 98 F | DIASTOLIC BLOOD PRESSURE: 72 MMHG | SYSTOLIC BLOOD PRESSURE: 120 MMHG | HEART RATE: 68 BPM

## 2023-08-21 PROCEDURE — G0299 HHS/HOSPICE OF RN EA 15 MIN: HCPCS

## 2023-08-21 RX ADMIN — Medication 10 MG: at 12:02

## 2023-08-21 ASSESSMENT — ENCOUNTER SYMPTOMS: CONSTIPATION: 1

## 2023-08-21 NOTE — HOSPICE
Patient on phone with family upon arrival. Respirations were unlabored. No nonverbal signs of pain, agitation or shortness of breath. Lung were clear. Bowel sounds are active. Heart rate is regular with no edema and pulses palpable. Skin well moisturized. Wound care performed. Abdomen soft and nontender. No BM for 4 days per patient. Given ducolox suppository, No signs of infection. Told to call hospice with questions concerns and changes in status. Family updated. Education on [employer] performed with daughter Tiffany Rubio. Discussed energy conservation and effects from cancer. NP to visit on Friday. Will discuss bowel regimen with patient at this time as she has not been ready to change from what she has historically taken.

## 2023-08-22 ENCOUNTER — HOME CARE VISIT (OUTPATIENT)
Facility: HOME HEALTH | Age: 88
End: 2023-08-22
Payer: MEDICARE

## 2023-08-22 PROCEDURE — G0299 HHS/HOSPICE OF RN EA 15 MIN: HCPCS

## 2023-08-22 ASSESSMENT — ENCOUNTER SYMPTOMS: CONSTIPATION: 1

## 2023-08-22 NOTE — HOSPICE
Symptom management visit. Constipation medication has been adjusted. Education performed with patient family and staff. All voice understanding. Patient states she finally had a BM this morning. Discussed pain medications and its effects on bowels. Told to call hospice with questions concerns and changes in status.

## 2023-08-25 ENCOUNTER — HOME CARE VISIT (OUTPATIENT)
Facility: HOME HEALTH | Age: 88
End: 2023-08-25
Payer: MEDICARE

## 2023-08-25 ENCOUNTER — HOME HEALTH/ HOSPICE FACE TO FACE (OUTPATIENT)
Facility: HOSPITAL | Age: 88
End: 2023-08-25

## 2023-08-25 ENCOUNTER — HOME CARE VISIT (OUTPATIENT)
Age: 88
End: 2023-08-25
Payer: MEDICARE

## 2023-08-25 VITALS
HEART RATE: 76 BPM | DIASTOLIC BLOOD PRESSURE: 62 MMHG | SYSTOLIC BLOOD PRESSURE: 120 MMHG | RESPIRATION RATE: 14 BRPM | TEMPERATURE: 98.2 F

## 2023-08-25 PROCEDURE — G0299 HHS/HOSPICE OF RN EA 15 MIN: HCPCS

## 2023-08-25 NOTE — HOSPICE
Daniel Domingo to meet hospice patient with CM Lissette Dietz. 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 She is taking norco for pain in the area about 3 times a week. Patient had opted for no treatment. 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 We do not have good records in our EMR of past history and oncology notes. Patient denies current pain. She had nausea earlier today relieved by zofran. She continues to take zofran tiw for breast pain and hospice changes her dressings to the breast with flagyl powder for odor control. Patient is eating well and states she sleeps well. She lives in [facility]. Family visit many days a week and bring in food and she has PB&J, tomato sandwiches and other food in her room that she can make with a microwave. She does not like the food at her facility. She denies headaches or confusion, sob, cp, edema. No falls and she wears a medic alert. O.  Gen:  pleasant female seated in recliner in room in NAD. Good historian. She has deformities of fingers and toes due to RA. Eyes:  anicteric, conjunctiva pink. HENT:  oral mucosa moist without rashes or lesions  Neck:  No JVD or LAD  Heart:  RRR S1S2 no GRM's  Lungs:  CTA A&P, no increased wob  Abd:  no distention, bowel sounds normoactive, NTTP, no HSM/masses/R/G/R  Skin:  W&D. Approximately 3X4 cm open lesion on underside of left breast with large mass half-way up breast.  Some odor from area, small amount of yellow drainage. MS:  all fingers and toes deformed from RA  Neuro:  A&O X 3, follows commands  Psyc:  very social and pleasant, no anxiety/agitation or confusion    A/P   Fungating breast cancer. Continue norco prn for pain. Continue calcium alginate dressings with flagyl powder.

## 2023-08-25 NOTE — HOSPICE
Patient stated she had pain earlier in the day and NOrco relieved it and Zofran helped with her nausea. She took some sorbitol for constipation today. Go Renee NP in for a visit. NO new orders. Wound care performed to left breast and she tolerated well. Followed up on supplies that were ordered and nurse took them to her room as they were sitting at front. Made patient a sandwich. Told to call hospice with questions concerns or changes in status.

## 2023-08-28 ENCOUNTER — HOME CARE VISIT (OUTPATIENT)
Facility: HOME HEALTH | Age: 88
End: 2023-08-28
Payer: MEDICARE

## 2023-08-28 VITALS
TEMPERATURE: 98.6 F | HEART RATE: 80 BPM | DIASTOLIC BLOOD PRESSURE: 72 MMHG | RESPIRATION RATE: 14 BRPM | SYSTOLIC BLOOD PRESSURE: 118 MMHG

## 2023-08-28 PROCEDURE — G0299 HHS/HOSPICE OF RN EA 15 MIN: HCPCS

## 2023-08-28 NOTE — HOSPICE
Patient  visiting with children upon arrival. Respirations were unlabored. No nonverbal signs of pain, agitation or shortness of breath. Lung were clear. Bowel sounds are active. Heart rate is regular with no edema and pulses palpable. Skin well moisturized. Wound care performed. Tolerated well. Abdomen soft and nontender. No signs of infection. No diarrhea. Will take senna this week and reevaluate medications on Friday. Told to call hospice with questions concerns and changes in status. Family updated.

## 2023-09-01 ENCOUNTER — HOME CARE VISIT (OUTPATIENT)
Facility: HOME HEALTH | Age: 88
End: 2023-09-01
Payer: MEDICARE

## 2023-09-01 PROCEDURE — G0299 HHS/HOSPICE OF RN EA 15 MIN: HCPCS

## 2023-09-04 ENCOUNTER — HOME CARE VISIT (OUTPATIENT)
Age: 88
End: 2023-09-04
Payer: MEDICARE

## 2023-09-04 VITALS
HEART RATE: 69 BPM | DIASTOLIC BLOOD PRESSURE: 70 MMHG | SYSTOLIC BLOOD PRESSURE: 154 MMHG | OXYGEN SATURATION: 99 % | RESPIRATION RATE: 20 BRPM

## 2023-09-04 VITALS
RESPIRATION RATE: 14 BRPM | TEMPERATURE: 98.6 F | HEART RATE: 80 BPM | DIASTOLIC BLOOD PRESSURE: 70 MMHG | SYSTOLIC BLOOD PRESSURE: 120 MMHG

## 2023-09-04 PROCEDURE — G0299 HHS/HOSPICE OF RN EA 15 MIN: HCPCS

## 2023-09-04 ASSESSMENT — ENCOUNTER SYMPTOMS: CONSTIPATION: 1

## 2023-09-04 NOTE — HOSPICE
Patient just finishing breakfast upon arrival. Respirations were unlabored. No nonverbal signs of pain, agitation or shortness of breath. Lung were clear. Bowel sounds are active. Heart rate is regular with +1 LE edema and pulses palpable. Skin well moisturized. Wound care performed. Tolerated well. Abdomen soft and nontender. No signs of infection. Education perfomred on energy conservation, medication education and disease process. Pt voiced understanding. Told to call hospice with questions concerns and changes in status. Family, Jorge Rouse and Willie,  updated.

## 2023-09-05 ENCOUNTER — HOME CARE VISIT (OUTPATIENT)
Age: 88
End: 2023-09-05
Payer: MEDICARE

## 2023-09-05 NOTE — HOSPICE
This  conducted routine spiritual care visit to patient in collaboration with supervising , Maria R Klein. Upon arrival patient was seated in her facility suite, awake and alert, welcoming of the visit. The patient's son and daughter in law came during visit and brought the patient lunch. As the patient and family ate,  engaged with them in life review and provided active listening and guided questions around a recent family vacation.  provided compassionate presence and prayer.  will continue to offer spiritual support for patient's connection to her Ton Raheem patel and sense of peace.

## 2023-09-05 NOTE — HOSPICE
Routine hospice SN assessment visit  Patient was being seen by podiatrist upon RN arrival downstairs  Son went to bring her back to her room for hospice assessment as she was finished being seen by podiatrist   Patient alert and oriented x 4  Pleasnet and engaging  No non verbal S/S of acute pain or shortness of breath noted  Family into visit , son and daughter in law and granddaughter   Assessment completed and wound care performed left breast area per wound care instructions  Patient tolerated wound care well   No new issues reported by patient or staff caring for patient  patient continues to have constipation issues alternating with loose stools   Patient is able to amblate to bathroom with her walker   Intake fair and varies form meal to meal  She denies having nausea   Breath sounds clear, diminished at bases  No cough  No peripheral edema noted this visit  Patient states that she has been elevating her legs as much as possible  To continue with current hospice plan of care  Informed staff and family to call hospice with concerns / needs  Understnding verbalized

## 2023-09-08 ENCOUNTER — HOME CARE VISIT (OUTPATIENT)
Facility: HOME HEALTH | Age: 88
End: 2023-09-08
Payer: MEDICARE

## 2023-09-08 VITALS
DIASTOLIC BLOOD PRESSURE: 82 MMHG | TEMPERATURE: 98.6 F | HEART RATE: 88 BPM | RESPIRATION RATE: 14 BRPM | SYSTOLIC BLOOD PRESSURE: 136 MMHG | OXYGEN SATURATION: 99 %

## 2023-09-08 PROCEDURE — G0299 HHS/HOSPICE OF RN EA 15 MIN: HCPCS

## 2023-09-08 NOTE — HOSPICE
Patient just eating sardines upon arrival. Respirations were unlabored. No nonverbal signs of pain, agitation or shortness of breath. Lung were clear. Bowel sounds are active. BM improved this week. Heart rate is regular with no edema and pulses palpable. Skin well moisturized. Wound care performed. Abdomen soft and nontender. No signs of infection. States she pain regimen is improved. Told to call hospice with questions concerns and changes in status. Family updated.

## 2023-09-11 ENCOUNTER — HOME CARE VISIT (OUTPATIENT)
Facility: HOME HEALTH | Age: 88
End: 2023-09-11
Payer: MEDICARE

## 2023-09-11 PROCEDURE — G0155 HHCP-SVS OF CSW,EA 15 MIN: HCPCS

## 2023-09-12 ENCOUNTER — HOME CARE VISIT (OUTPATIENT)
Facility: HOME HEALTH | Age: 88
End: 2023-09-12
Payer: MEDICARE

## 2023-09-12 PROCEDURE — G0299 HHS/HOSPICE OF RN EA 15 MIN: HCPCS

## 2023-09-12 NOTE — HOSPICE
LMSW made visit for support and socialization. LMSW met with patient who was seated in her chair in her room. LMSW provided socialization to patient who easily engaged. When asked, patient stated that she was feeling okay. Patient shared that she had just finished her lunch. Patient reported no pain at time of visit but stated that she knows she can ask for pain medicine if she needs it. Patient shared that she is grateful for hospice support. LMSW checked in with facility staff who reported no new needs or concerns. LMSW will continue to offer support and will remain available to address needs that arise.

## 2023-09-13 VITALS
OXYGEN SATURATION: 98 % | SYSTOLIC BLOOD PRESSURE: 140 MMHG | TEMPERATURE: 99.2 F | HEART RATE: 78 BPM | DIASTOLIC BLOOD PRESSURE: 55 MMHG

## 2023-09-13 ASSESSMENT — ENCOUNTER SYMPTOMS: CONSTIPATION: 1

## 2023-09-13 NOTE — HOSPICE
Routine hospice SN assessment visit  Patient is sitting in chair in her room awake, alert and eating jello  Oriented x4  No non verbal S/S of pain or shortness of breath noted during visit  Patient reports having a headache  She had taken Norco at 12 noon and requesting another dose now   Staff informed of need for pain medication  Wound care rendered to area left breast per wound care instructions  Staff supplied Flagyl as ordered to place in wound area  Patient tolerated wound care well   Constipation is an ongoing issue  last bm on 9-8-23  Senna 2 tabs is taken q am and prune juice    RN encouraged patient to drink more water also  Staff informed to call hospice if not resolved  Understanding verbalized by staff   Patient asked for cup of  rice to be heated in microwave  She states this may help her have a bm  She does not like the food at facility and often does not eat well      No new issues since last Sn assessment  To continue with current hospice plan of care  Staff infomed to call hospice with concerns / needs

## 2023-09-14 ENCOUNTER — HOME CARE VISIT (OUTPATIENT)
Age: 88
End: 2023-09-14
Payer: MEDICARE

## 2023-09-15 ENCOUNTER — HOME CARE VISIT (OUTPATIENT)
Facility: HOME HEALTH | Age: 88
End: 2023-09-15
Payer: MEDICARE

## 2023-09-15 PROCEDURE — G0299 HHS/HOSPICE OF RN EA 15 MIN: HCPCS

## 2023-09-16 ASSESSMENT — ENCOUNTER SYMPTOMS
CONSTIPATION: 1
STOOL DESCRIPTION: LARGE

## 2023-09-16 NOTE — HOSPICE
Hospice SN visit to perform wound care to area left breast per wound care instructions  Patient tolerated wound care well  No non verbal S/S of acute pain or shortness of breath noted on room air   Patient denies having pain or shortness of breath during visit  Granddaughter in visiting for lunch with patient and expressed no concerns  To continue with current hospice plan of care  Informed staff / granddaughter to call hospice with concerns / needs  Understanding verbalized  No  Medication refills or supplies needed this visit

## 2023-09-18 NOTE — HOSPICE
Music Therapy Progress Note  Meghan Iraheta  [address], [address] [facility], [facility] [place]    Patient Telephone Number: [phone]  Jehovah's witness Affiliation: [employer] Lopez of [place]  Language: Rolly Hancock    Date: 9/14/23    Mental Status:   AprilRickieNuris  ] Alert [  ] Rehan Sallie [  ]  Confused  [  ] Minimally responsive  [  ] Sleeping    Communication Status: [  ] Impaired Speech [ X ] Verbal     Physical Status:   [  ] Oxygen in use  [  ] Hard of Hearing [  ] Vision Impaired   [  ] Ambulatory  [  ] Ambulatory with assistance [  ] Non-ambulatory     Music Preferences, Background: __Hymns, leydi \"[place]aven of Rest\" and \"Now I Belong to [place]\"    Clinical Problem addressed: Social and Emotional Support at [facility]) met in session:  Physical/Pain management (Scale of 1-10):    Pre-session rating ___________    Post-session rating __________  [ Royetta Sickle ] Increased relaxation               [  ] Affected breathing patterns  [  ] Decreased muscle tension               [  ] Decreased agitation  [  ] Affected heart rate    [  ] Increased alertness     Emotional/Psychological:  [ X ] Increased self-expression   [  ] Decreased aggressive behavior   [  ] Decreased feelings of stress              [ X ] Discussed healthy coping skills     [  ] Improved mood    [  ] Decreased withdrawn behavior     Social:  [  ] Decreased feelings of isolation/loneliness [ X ] Positive social interaction    Murphy  ] Provided support and/or comfort for family/friends    Spiritual:  [ X ] Spiritual support    [  ] Expressed peace  AprilOphelia  ] Expressed patel    [  ] Discussed beliefs    Techniques Utilized (Check all that apply):   [  ] Procedural support MT [  ] Music for relaxation             AprilRickieNuris  ] Patient preferred music  [  ] Zulma analysis  [  ] Song choice              [  ] Music for validation  [  ] Entrainment              [  ] Movement to music             [  ] Guided visualization  [  ] Teja Link  [  ] Patient instrument playing [  ] Elisa Dotson writing  [  ]

## 2023-09-19 ENCOUNTER — HOME CARE VISIT (OUTPATIENT)
Facility: HOME HEALTH | Age: 88
End: 2023-09-19
Payer: MEDICARE

## 2023-09-19 VITALS — TEMPERATURE: 98.4 F | OXYGEN SATURATION: 98 % | HEART RATE: 59 BPM | RESPIRATION RATE: 18 BRPM

## 2023-09-19 PROCEDURE — G0299 HHS/HOSPICE OF RN EA 15 MIN: HCPCS

## 2023-09-20 NOTE — HOSPICE
Routine hospice Sn assessment visit  Patient is sitting in chair in her apartment watching tv  Awake and alert  No S/S of acute pain or shortness of breath noted this visit   Patient denies pain or shortness of breath on room air however reports that she just does not feel well today  No noted changes in color, vital signs, appetite or sleeping  Patient states that it is a long day when she does not have her family visit   Breath sounds clear, no cough noted, Abdomen sodt and non tender + bowel sounds  Last bm 3 days ago No peripheral edema noted      Wound care rendered to left breast per wound care  instructions  Patient tolerated well   No new  issues reported by staff  No medication refills or supplies needed this visit  Staff informed to call hospice with concerns / needs   Understanding verbalized

## 2023-09-21 ENCOUNTER — HOME CARE VISIT (OUTPATIENT)
Facility: HOME HEALTH | Age: 88
End: 2023-09-21
Payer: MEDICARE

## 2023-09-21 VITALS
DIASTOLIC BLOOD PRESSURE: 68 MMHG | HEART RATE: 67 BPM | SYSTOLIC BLOOD PRESSURE: 118 MMHG | RESPIRATION RATE: 14 BRPM | TEMPERATURE: 98.4 F

## 2023-09-21 PROCEDURE — G0299 HHS/HOSPICE OF RN EA 15 MIN: HCPCS

## 2023-09-21 NOTE — HOSPICE
Patient visiting son and daughter in law upon arrival. Respirations were unlabored. No nonverbal signs agitation or shortness of breath. Had a headache and took tylenolfrom staff nurse. Will ask for hydrocodone if she does not get relief. Wound care to left breast performed and tolerated well. Lungs were clear. Bowel sounds are active. Heart rate is regular with no edema and pulses palpable. Skin well moisturized. Abdomen soft and nontender. No signs of infection. Had loose stools on Monday and last Friday but no longer has these. She was educated again on immodium and diet. She does not want to change medications at this time as she fears constipation. She states she has had irritable  bowel for years. She likes to eat take out and [place] cakes. She feels this is a comfort to her. She does not want to eat what assisted living has to offer her. Stated she had a high bp earlier in week but if it were higher today she would not want to treat it. BP within normal range today. Told to call hospice with questions concerns and changes in status. Family updated.

## 2023-09-25 ENCOUNTER — HOME CARE VISIT (OUTPATIENT)
Facility: HOME HEALTH | Age: 88
End: 2023-09-25
Payer: MEDICARE

## 2023-09-25 VITALS
OXYGEN SATURATION: 99 % | TEMPERATURE: 97.8 F | HEART RATE: 68 BPM | RESPIRATION RATE: 14 BRPM | DIASTOLIC BLOOD PRESSURE: 68 MMHG | SYSTOLIC BLOOD PRESSURE: 112 MMHG

## 2023-09-25 PROCEDURE — G0299 HHS/HOSPICE OF RN EA 15 MIN: HCPCS

## 2023-09-25 ASSESSMENT — ENCOUNTER SYMPTOMS: CONSTIPATION: 1

## 2023-09-25 NOTE — HOSPICE
Patient watching tv upon arrival. Respirations were unlabored. No nonverbal signs of pain, agitation or shortness of breath. Had nausea this am and a prn zofran. Lung were clear. Bowel sounds are active. Heart rate is regular with +1 le edema and pulses palpable. Skin well moisturized. Wound care performed. see addendum. No signs of infection. Abdomen soft and nontender. No signs of infection. Told to call hospice with questions concerns and changes in status. Family updated.

## 2023-09-29 ENCOUNTER — HOME CARE VISIT (OUTPATIENT)
Age: 88
End: 2023-09-29
Payer: MEDICARE

## 2023-09-29 VITALS
SYSTOLIC BLOOD PRESSURE: 130 MMHG | TEMPERATURE: 98.4 F | HEART RATE: 66 BPM | DIASTOLIC BLOOD PRESSURE: 74 MMHG | RESPIRATION RATE: 16 BRPM | OXYGEN SATURATION: 100 %

## 2023-09-29 PROCEDURE — G0299 HHS/HOSPICE OF RN EA 15 MIN: HCPCS

## 2023-09-29 ASSESSMENT — ENCOUNTER SYMPTOMS
DIARRHEA: 1
BOWEL INCONTINENCE: 1
CONSTIPATION: 1

## 2023-09-29 NOTE — HOSPICE
Joint visit with Rashida Jones RN. Upon arrival, patient sitting in her chair listening to the radio. Patient reports that she is waiting to get her shower in just a little bit from the staff. Patient oriented x 4. Denies pain at the time. No SOB noted. Patient reports having intermittent nausea. She states that she took meds earlier this am.  Educated patient that if she needs additional nausea meds, she has some there that she can ask the facility staff for. She denies needing any further meds at this time. Patient reports feeling constipated and trying to go to the bathroom earlier today-without success. DIcussed taking additonal meds but patient declines wanting to take anything else. Aware that she can ask for some meds later to help with symptoms of constipation. Abdomen soft, non tender. Bowel sounds active x 4 quadrants. Patient reports she hasn't had breakfast but plans to eat after her shower. Lungs clear and diminished in the bases. No cough noted. Patient with lower leg edema- can see where her socks had made indentation in her lower calves. Non pitting. Wound care performed to left breast cancer wound. Area cleaned with wound cleanser, algonate with flagyl covered with occulsive dressing. Patient tolerated well. No supplies or meds needed at this visit. No conerns reported from facility staff. Educated facility staff to call with any concerns or needs and verified hospice number to call with any questions.

## 2023-10-03 ENCOUNTER — HOME CARE VISIT (OUTPATIENT)
Facility: HOME HEALTH | Age: 88
End: 2023-10-03
Payer: MEDICARE

## 2023-10-03 VITALS
HEART RATE: 65 BPM | SYSTOLIC BLOOD PRESSURE: 163 MMHG | DIASTOLIC BLOOD PRESSURE: 82 MMHG | OXYGEN SATURATION: 100 % | TEMPERATURE: 98 F

## 2023-10-03 PROCEDURE — G0299 HHS/HOSPICE OF RN EA 15 MIN: HCPCS

## 2023-10-03 ASSESSMENT — ENCOUNTER SYMPTOMS
CONSTIPATION: 1
DIARRHEA: 1

## 2023-10-04 NOTE — HOSPICE
BP taken several times with electronic device and also a manual BP cuff  by hospice RN at start of visit    Per patient night shift took vital signs and BP was elevated 160    Values obtained by hospice RN    162/83    170/84    160/80  which is above her normal range     Patient alert and in good spirits   Sitting in her chair in her room   Skin is cool and dry  Denies having pain or shortness of breath on room air   02 sat= 100%  HR 65   Color is pale  Breath sounds clear, diminished at bases  no cough noted   Abdomen is soft and non tender + bowel sounds, Bm yesterday   Trace edema lower extermities    Wound care performed left breast area per wound care instructions  Patient tolerated procedure well    NP, Kin Emily notified of BP  readings as well as patient's  daughter. Malu  Per the daughter patient took BP  medication several years ago however it was discontinued related to her BP dropping  Patient as well as daughter are not interested in patient starting back on an antihypertensive med unless necessary   Daughter thinks it is most likely related to anxiety, stress    RN CM also informed   Will continue to monitor patient for changes in BP and any new symptoms  Informed daughter, and staff to call hospice with concerns / needs  Understanding verbalized

## 2023-10-06 ENCOUNTER — HOME CARE VISIT (OUTPATIENT)
Age: 88
End: 2023-10-06
Payer: MEDICARE

## 2023-10-06 VITALS
TEMPERATURE: 98 F | DIASTOLIC BLOOD PRESSURE: 70 MMHG | HEART RATE: 72 BPM | SYSTOLIC BLOOD PRESSURE: 150 MMHG | RESPIRATION RATE: 14 BRPM

## 2023-10-06 PROCEDURE — G0299 HHS/HOSPICE OF RN EA 15 MIN: HCPCS

## 2023-10-06 NOTE — HOSPICE
Patient just finishing breakfast upon arrival. Respirations were unlabored. No nonverbal signs of pain, agitation or shortness of breath. States she had norco for generalized pain twice this week. Some nausea noted and she had just received her zofran  Lung were clear. Bowel sounds are active. Heart rate is regular with +1 LE edema and pulses palpable. Skin well moisturized. Abdomen soft and nontender. Wound care performedto left breast. see addendum. Told to call hospice with questions concerns and changes in status.

## 2023-10-10 ENCOUNTER — HOME CARE VISIT (OUTPATIENT)
Facility: HOME HEALTH | Age: 88
End: 2023-10-10
Payer: MEDICARE

## 2023-10-10 ENCOUNTER — HOME CARE VISIT (OUTPATIENT)
Age: 88
End: 2023-10-10
Payer: MEDICARE

## 2023-10-10 VITALS
OXYGEN SATURATION: 98 % | TEMPERATURE: 98.4 F | HEART RATE: 72 BPM | RESPIRATION RATE: 20 BRPM | DIASTOLIC BLOOD PRESSURE: 58 MMHG | SYSTOLIC BLOOD PRESSURE: 123 MMHG

## 2023-10-10 PROCEDURE — G0299 HHS/HOSPICE OF RN EA 15 MIN: HCPCS

## 2023-10-10 ASSESSMENT — ENCOUNTER SYMPTOMS: CONSTIPATION: 1

## 2023-10-10 NOTE — HOSPICE
conducted routine spiritual care visit to patient at her facility apartment. Patient was seated in her overstuffed chair, reporting discomfort from constipation, reporting that she was given medication, and denying any further need for care.  provided active listening, validation of feelings, reflection upon her spiritual history and beliefs, hymn singing, and prayer. The patient explored the chery and hope she experiences from her patel and expressed gratitude for her family and Zoroastrian members who call and visit her.  offered a prayer. Chriss Mai will continue to offer spiritual support for patient's connection to her patel, and to enhance her peace and chery.

## 2023-10-11 NOTE — HOSPICE
Medication refills from [address] [place] [address] per triage today     Norco 5-325 mg  ordered     Routine hospice assessment visit to render wound care to area left breast per wound care instructions See wound addendum     Patient tolerated wound care well  No non verbal S/S of acute pain or shortness of breath during this visit   Patient states she does not have pain right now however when she does have pain in  abdominal area,  the Norco tablet does not always help   RN informed patient that she now has an order to give Morphine 5 mg for pain not relieved  by Hydrocodone-acetaminiophen   Prefilled Morphine syringes available in the facility    Hospice RN disscussed with facility med nurse  plan to give Morphine as prescribed for pain not relieved by Norco  Understanding verbalized    /58 this visit  To continue with current hospice plan of care  Call placed to patient's daughter, Lauryn Herndon, with update on patient's status and plan of care  Informed staff to call hospice with concerns / needs

## 2023-10-12 ENCOUNTER — HOME CARE VISIT (OUTPATIENT)
Age: 88
End: 2023-10-12
Payer: MEDICARE

## 2023-10-13 ENCOUNTER — HOME CARE VISIT (OUTPATIENT)
Age: 88
End: 2023-10-13
Payer: MEDICARE

## 2023-10-13 VITALS
SYSTOLIC BLOOD PRESSURE: 132 MMHG | TEMPERATURE: 98 F | RESPIRATION RATE: 16 BRPM | HEART RATE: 77 BPM | DIASTOLIC BLOOD PRESSURE: 68 MMHG

## 2023-10-13 PROCEDURE — G0299 HHS/HOSPICE OF RN EA 15 MIN: HCPCS

## 2023-10-13 ASSESSMENT — ENCOUNTER SYMPTOMS: CONSTIPATION: 1

## 2023-10-13 NOTE — HOSPICE
Patient just finishing lunch upon arrival. Respirations were unlabored. No nonverbal signs of pain, agitation or shortness of breath. Stated she had been in pain and the morphine helped but did not completly take away her discomfort. She has generalized pain and abdominal pain. Checked for impaction digitally. None felt. Stanford Midland NP increase morphine dose to 10 mg and 25 syrringes were ordered from [facility] who stated they wuld deliver today. Lung were clear. Bowel sounds are active. Heart rate is regular with trace edema and pulses palpable. Skin well moisturized. Wound care performed. Abdomen soft and nontender. No signs of infection. Education on pain management completed with staff. Told to call hospice with questions concerns and changes in status. Family updated. c/w decadron for 10 days

## 2023-10-14 ENCOUNTER — HOME CARE VISIT (OUTPATIENT)
Age: 88
End: 2023-10-14
Payer: MEDICARE

## 2023-10-15 ENCOUNTER — HOME CARE VISIT (OUTPATIENT)
Age: 88
End: 2023-10-15
Payer: MEDICARE

## 2023-10-15 VITALS
HEART RATE: 78 BPM | DIASTOLIC BLOOD PRESSURE: 64 MMHG | RESPIRATION RATE: 14 BRPM | SYSTOLIC BLOOD PRESSURE: 120 MMHG | TEMPERATURE: 97.8 F

## 2023-10-15 PROCEDURE — G0299 HHS/HOSPICE OF RN EA 15 MIN: HCPCS

## 2023-10-16 ENCOUNTER — HOME CARE VISIT (OUTPATIENT)
Facility: HOME HEALTH | Age: 88
End: 2023-10-16
Payer: MEDICARE

## 2023-10-16 ENCOUNTER — HOME CARE VISIT (OUTPATIENT)
Age: 88
End: 2023-10-16
Payer: MEDICARE

## 2023-10-16 PROCEDURE — G0155 HHCP-SVS OF CSW,EA 15 MIN: HCPCS

## 2023-10-16 NOTE — HOSPICE
LMSW made visit for support. Upon arrival, patient was seated in her recliner and had just finished her breakfast. Patient stated that she was feeling anxious and was concerned because her med tech had said her anxiety medicine was not present in facility. Patient stated that she was feeling some pain as well. Patient stated that she was looking forward to her daughter coming for lunch today. LMSW followed up with ADIS Mclaughlin regarding medication needs. Facility staff reported no additional concerns. LMSW will conitnue to offer support and will remain available to address needs that arise.

## 2023-10-20 ENCOUNTER — HOME CARE VISIT (OUTPATIENT)
Facility: HOME HEALTH | Age: 88
End: 2023-10-20
Payer: MEDICARE

## 2023-10-20 PROCEDURE — G0299 HHS/HOSPICE OF RN EA 15 MIN: HCPCS

## 2023-10-20 ASSESSMENT — ENCOUNTER SYMPTOMS: STOOL DESCRIPTION: LARGE

## 2023-10-20 NOTE — HOSPICE
Patient up in chair. Upset about her eggs at breakfast but ate some cereal and felt better. Made a peanut butter sandwich for lunch. She feels her pain medication and anxiety medication is working better and would like to keep it the same for now. She is having a decrease in energy. She had a large BM yesterday. Lungs are clear, Bowel Sounds are active. Pedal pulses present and +1 edema to lower extremities. Wound care performed. See addendum. Told to call hospice with questions concerns and changes in status. Nursing states that they do not need refills on medications at this time.

## 2023-10-24 ENCOUNTER — HOME CARE VISIT (OUTPATIENT)
Facility: HOME HEALTH | Age: 88
End: 2023-10-24
Payer: MEDICARE

## 2023-10-24 VITALS
TEMPERATURE: 97.8 F | SYSTOLIC BLOOD PRESSURE: 102 MMHG | HEART RATE: 68 BPM | RESPIRATION RATE: 16 BRPM | DIASTOLIC BLOOD PRESSURE: 60 MMHG

## 2023-10-24 PROCEDURE — G0299 HHS/HOSPICE OF RN EA 15 MIN: HCPCS

## 2023-10-24 NOTE — HOSPICE
Patient up in chair visiting with family. Lungs are clear. Bowel sounds active. No abdominal pain or tenderness. Abdomen is soft. Large BM yesterday. +1 edema to lower extremities. Pedal pulses positive. Respirations even and nonlabored. She doesn't feel staff come to attend to her needs quick enough and daughter stated she would address this with admin of [place]. Active listening and emotional support given. Told to call hospice with questions concerns and changes in status. Wound care performed with no signs of infection and patient tolerated well.

## 2023-10-26 RX ORDER — SENNOSIDES 8.6 MG
TABLET ORAL
Qty: 60 TABLET | Refills: 0 | Status: SHIPPED | OUTPATIENT
Start: 2023-10-26

## 2023-10-27 ENCOUNTER — HOME CARE VISIT (OUTPATIENT)
Age: 88
End: 2023-10-27
Payer: MEDICARE

## 2023-10-27 VITALS
SYSTOLIC BLOOD PRESSURE: 140 MMHG | TEMPERATURE: 98.2 F | HEART RATE: 77 BPM | RESPIRATION RATE: 14 BRPM | DIASTOLIC BLOOD PRESSURE: 88 MMHG | OXYGEN SATURATION: 97 %

## 2023-10-27 PROCEDURE — G0299 HHS/HOSPICE OF RN EA 15 MIN: HCPCS

## 2023-10-27 NOTE — HOSPICE
Patient up in chair. Alert and oriented to all spheres. Stated she had fallen around 1230 this morning going to bathroom. States she has been getting up at night with urge to void but ofen not voiding. Abdomen soft and nontender, no fever, no odor to urine. [employer] informed of fall and pyridium ordered in hopes of decreasing the times she needs to get up at night. Skin tear cleansed to right arm. petroleum gauze and occlusive bandaid applied. NO other injury noted. Aware of medications for pain and discomfort but does not feel medications warranted at this time. Wound care performed to breast. no signs of infection. Tolerated procedure well. Family in to visit and updated. Told to call hospice with questions concerns and changes in status. Pyridium education performed with patient and staff. voiced understanding.

## 2023-10-31 ENCOUNTER — HOME CARE VISIT (OUTPATIENT)
Facility: HOME HEALTH | Age: 88
End: 2023-10-31
Payer: MEDICARE

## 2023-10-31 PROCEDURE — G0299 HHS/HOSPICE OF RN EA 15 MIN: HCPCS

## 2023-11-01 VITALS
OXYGEN SATURATION: 96 % | RESPIRATION RATE: 20 BRPM | DIASTOLIC BLOOD PRESSURE: 77 MMHG | HEART RATE: 68 BPM | SYSTOLIC BLOOD PRESSURE: 155 MMHG

## 2023-11-01 ASSESSMENT — ENCOUNTER SYMPTOMS
STOOL DESCRIPTION: LOOSE
PAIN LOCATION - PAIN QUALITY: ACHING

## 2023-11-01 NOTE — HOSPICE
Routine hospice SN assessment  Patient sitting in recliner chair awake and alert  Verbally engaging and oriented x 4  Patient reports that she had nausea earlier today which has resolved with Zofran  Pain was rated at 7/10 prior to receiving Norco 2 tabs around noon time  Pain now rated 3/10 which is acceptable   Patient states pain is in abdominal area and headache  Skin cool and dry  Breath sounds clear upper lobes diminished lower left side lower lobes  No fluid noted in lung fields  Abdomen soft and non tender + bowel l sounds  last BM yesterday  1+ edema bilateral ankles   Denies shortness of breath     Intake continues to be fair  Requested RN to fix bowl of soup during visit  She does not always like the food served by facility    Wound care performed left breast area per wound care instructions   Patient tolerated well   Skin tear left elbow area cleansed with wound cleanser, vaseline gauze applied F/B foam dressing   Patient tolerated dressing change to skin tear well    Patient is showing increased weakness and fatigue  Discussed fall prevention measures with patient   To continue with current hospice plan of care  Informed staff to call hospice with concerns / needs  Understanding verbalized   No medication refills or supplies needed this visit

## 2023-11-03 ENCOUNTER — HOME CARE VISIT (OUTPATIENT)
Age: 88
End: 2023-11-03
Payer: MEDICARE

## 2023-11-03 VITALS
DIASTOLIC BLOOD PRESSURE: 88 MMHG | RESPIRATION RATE: 14 BRPM | SYSTOLIC BLOOD PRESSURE: 139 MMHG | HEART RATE: 82 BPM | TEMPERATURE: 98.2 F

## 2023-11-03 PROCEDURE — G0299 HHS/HOSPICE OF RN EA 15 MIN: HCPCS

## 2023-11-03 ASSESSMENT — ENCOUNTER SYMPTOMS: CONSTIPATION: 1

## 2023-11-03 NOTE — HOSPICE
Arrived to patients home. She was up in chair having cheerios. appetite has decreased. She calls for assistance to go from sit to stand and energy level decreased. She has tremors to hands. . Oriented to name, time and place. Lungs are clear. Respirations re even and nonlabored. Bowel sounds are active. Abdomen Nontender and soft. Pedal pulses are positive and there is no lower extremity edema. Denies pain or discomfort as she had norco 30 minutes prior to visit. Feels current pain regimen is working well for her. No nonverbal signs or symptoms of agitation or shortness of breath. Wound care performed to left breast and tolerated well. Scabbing noted to left arm kin tear. No open area. Told to call hospice with questions concerns and changes in status. Family updated.

## 2023-11-07 ENCOUNTER — HOME CARE VISIT (OUTPATIENT)
Facility: HOME HEALTH | Age: 88
End: 2023-11-07
Payer: MEDICARE

## 2023-11-07 PROCEDURE — G0299 HHS/HOSPICE OF RN EA 15 MIN: HCPCS

## 2023-11-08 VITALS
TEMPERATURE: 98.8 F | RESPIRATION RATE: 20 BRPM | SYSTOLIC BLOOD PRESSURE: 141 MMHG | HEART RATE: 71 BPM | OXYGEN SATURATION: 96 % | DIASTOLIC BLOOD PRESSURE: 69 MMHG

## 2023-11-08 ASSESSMENT — ENCOUNTER SYMPTOMS
PAIN LOCATION - PAIN QUALITY: ACHING
DIARRHEA: 1

## 2023-11-08 NOTE — HOSPICE
Routine hospice SN assessment visit  Patient received sitting in her recliner chair awake and alert  Noted to have furrowed brow and states that she feels bad  Reports having pain all over rates 9/10  Patient is requesting pain medication  Visit RN informed medication nurse and patient was given Norco 2 tabs po for pain  Flagyl also obtained for wound care   Patient seems to have marked fatigue and is depressed this visit  Skin is warm and dry  Breath sounds clear, no cough or shortness of breath on room air   Patient reports having one episode of diarrhea yesterday and expresses concern that she has had no bm today  This is her norm  She also reports that her appetite is not good  Requested RN to heat up some soup in her microwave which she can eat   Wound care rendered to area left breast per wound care instructions  Patient tolerated well   To continue with current hospice plan of care   Medication reconciliation completed  No med refills needed   Informed staff to call hospice with concerns / needs  Understanding verbalized

## 2023-11-09 ENCOUNTER — HOME CARE VISIT (OUTPATIENT)
Facility: HOME HEALTH | Age: 88
End: 2023-11-09
Payer: MEDICARE

## 2023-11-09 VITALS
OXYGEN SATURATION: 97 % | RESPIRATION RATE: 14 BRPM | SYSTOLIC BLOOD PRESSURE: 109 MMHG | TEMPERATURE: 98.4 F | DIASTOLIC BLOOD PRESSURE: 68 MMHG | HEART RATE: 66 BPM

## 2023-11-09 PROCEDURE — G0299 HHS/HOSPICE OF RN EA 15 MIN: HCPCS

## 2023-11-09 ASSESSMENT — ENCOUNTER SYMPTOMS: CONSTIPATION: 1

## 2023-11-09 NOTE — HOSPICE
Wound care visit performed. No signs of infection noted. Tolerated procedure well. No pain today and stated she had a much better day so far. Sleeping better at night. Family updated . ACtive listening provided and emotion support to patient staff and daughter Corine Ken.

## 2023-11-13 ENCOUNTER — HOME CARE VISIT (OUTPATIENT)
Facility: HOME HEALTH | Age: 88
End: 2023-11-13
Payer: MEDICARE

## 2023-11-13 VITALS
TEMPERATURE: 98 F | SYSTOLIC BLOOD PRESSURE: 119 MMHG | RESPIRATION RATE: 14 BRPM | OXYGEN SATURATION: 98 % | HEART RATE: 72 BPM | DIASTOLIC BLOOD PRESSURE: 67 MMHG

## 2023-11-13 PROCEDURE — G0299 HHS/HOSPICE OF RN EA 15 MIN: HCPCS

## 2023-11-13 ASSESSMENT — ENCOUNTER SYMPTOMS: CONSTIPATION: 1

## 2023-11-13 NOTE — HOSPICE
Arrived at room and patient was listening to her Bible Study. Oriented to name to all spheres but having trouble remembering her medications. Facility ran out of pyridium causing her to have a request urge to void. Nurse has resent order this mornring and it was signed by a clinician per policy. escripts for scheduled lorazepam and hydrocodone sent. Respirations even and nonlabored. Bowel sounds active. Last BM Saturday. Abdomen soft and nontender. Appetite continues to be variable. NO lower extremity edema and pedal pulses positive. No nonverbal signs of agitation or shortness of breath at this time. 4/10 generalized discomfort noted and nurse will bring her her oxycodone. Family to be updated. Told to contact hospice with question, concerns, or change in status.

## 2023-11-14 ENCOUNTER — HOME CARE VISIT (OUTPATIENT)
Age: 88
End: 2023-11-14
Payer: MEDICARE

## 2023-11-14 NOTE — HOSPICE
conducted routine spiritual care visit to patient at her facility. The patient was alert and awake, seated in recliner chair, reporting that she hasn't been feeling very well but denying need for further pain management.  provided compassionate presence, prayer and hymn singing and engaged with patient life review and reflection on her sources of strength from her [employer] patel and her family. The patient readily engaged in conversation and reflection, exhibiting smiles and sharing about her life. The patient welcomed  to visit again. Rodrick Robertson will continue to offer spiritual support for patient's spiritual peace as patient continues to journey toward end of life.

## 2023-11-17 ENCOUNTER — HOME CARE VISIT (OUTPATIENT)
Facility: HOME HEALTH | Age: 88
End: 2023-11-17
Payer: MEDICARE

## 2023-11-17 VITALS
DIASTOLIC BLOOD PRESSURE: 69 MMHG | HEART RATE: 69 BPM | SYSTOLIC BLOOD PRESSURE: 146 MMHG | TEMPERATURE: 98.9 F | OXYGEN SATURATION: 94 % | RESPIRATION RATE: 20 BRPM

## 2023-11-17 PROCEDURE — G0155 HHCP-SVS OF CSW,EA 15 MIN: HCPCS

## 2023-11-17 PROCEDURE — G0299 HHS/HOSPICE OF RN EA 15 MIN: HCPCS

## 2023-11-17 ASSESSMENT — ENCOUNTER SYMPTOMS
CONSTIPATION: 1
PAIN LOCATION - PAIN QUALITY: ACHING

## 2023-11-17 NOTE — HOSPICE
LMSW made visit for support and socialization. Upon arrival, patient was seated in her recliner. Patient stated that she was feeling some pain though she had taken her medication and she felt weaker. Patient stated that she would like food from the refridgerator heated in the microwave so she could eat lunch. Patient stated that she feels weak in her legs and is fearful of falling. LMSW assisted patient by heating food for her in the microwave and placing it in front of her. Patient began to eat her lunch. LMSW and patient discussed need to use pendant to call facility staff for help with transfers, ambulation, and toileting if patient is concerned about falling. Patient verbalized understanding. LMSW checked in with facility med tech who reported no major concerns but stated that the \"floor staff\" is concerned about patient's opiate use due to patient asking for pain medication \"often. \" Med tech stated that she is in agreement with hospice philosophy and understands patient's cancer diagnosis could easily cause pain requiring opiate intervention. LMSW asked whether staff's reservations about opiate use are impacting medication administration. Med tech stated that meds are administered as ordered. LMSW will follow up with ADIS Mclaughlin. LMSW called and left a voicemail for son Lauro Moore offering support. LMSW will continue to offer support and will remain available to addres needs that arise.

## 2023-11-18 NOTE — HOSPICE
Routine hospice SN assessment visit  Patient sitting in her recliner in her room with family visiting  Patent is alert and reports feeling poorly this afternoon  She had requested pain pills earlier, Norco 2 tabs for generalized pain  which has not helped at all  Patient is now asking for morphine and med for nausea which both were administered by med tech with good effect    Patient is not eating well recently no appetite  Yogurt and Ensure were given by her granddaughter per patient  request    Patient is very anxcious regarding her pain and fear also of falling again She stated that her legs are weak  RN recomended that she call for assitance from staff when needs to get up to bathroom or other activities  Understanding verbalized    Wound care rendered to left breast area per wound care instructions  Pstient tolerated well   No new issues reported by staff this visit  To continue with current hospice plan of care Informed staff to call hospice with concerns / needs  Understanding verbalized    Medications refills per Rx3 this evening   Norco prn tabs and Lorazepam scheduled Qam and QHs to be delivered to the facility   No supplies needed at this time

## 2023-11-21 ENCOUNTER — HOME CARE VISIT (OUTPATIENT)
Age: 88
End: 2023-11-21
Payer: MEDICARE

## 2023-11-21 ENCOUNTER — HOME HEALTH/ HOSPICE FACE TO FACE (OUTPATIENT)
Age: 88
End: 2023-11-21

## 2023-11-21 VITALS
HEART RATE: 84 BPM | DIASTOLIC BLOOD PRESSURE: 82 MMHG | RESPIRATION RATE: 16 BRPM | TEMPERATURE: 47.1 F | SYSTOLIC BLOOD PRESSURE: 138 MMHG

## 2023-11-21 PROCEDURE — G0299 HHS/HOSPICE OF RN EA 15 MIN: HCPCS

## 2023-11-21 NOTE — FACE TO FACE
[facility]   Good Help to Those in Need  FACE TO [place]  [phone]    Patient Name: Salvador Lopez  YOB: 1932    Date of Face to Face Visit: 11/21/23    [facility] Visit:  [] [facility] [] [facility] [] [facility] [] [facility] [] [address] [facility]  [] Home [x] Other:  [facility]    Principle Hospice Diagnosis: Metastatic breast cancer  Related Hospice diagnosis: Chronic pain, dyspnea  Other Hospice diagnosis: Severe scoliosis, cerebral aneurysms, intracranial atherosclerotic disease, seizures, AMS, HTN, mild carotid stenosis, GERD    Benefit period number: 3  First day of this BP benefit period: 12/20/2023     HOSPICE SUMMARY      Salvador Lopez is a 80y.o. year old  female who is being evaluated for re-certification for Hospice services. Since admission to Hospice on 6/23/2023 for metastatic breast cancer, the patient has demonstrated the following signs/symptoms: pain, increased weakness/debility, anorexia, anxiety, alternating constipation/diarrhea, and fungating L breast wound. Routine home visit today in conjunction with RNCM for F2F evaluation. Pt observed sitting in recliner in sitting area of room. She is alert and able to answer orientation questions appropriately x4; but demonstrates poor short term recall and is unreliable historian. Continues to demonstrate periods of increased anxiety. Receives scheduled Lorazepam twice daily. Review of skilled nursing notes indicates that she often appears fatigued and continues to subjectively report increased generalized weakness. Having increased difficulty with sit to stand transition. Suffered 1 fall without injury during this cert period on 64/83/4006. Ambulates with rollator. Requires assistance with bathing and grooming. Functional incontinence. Wears adult briefs in case of \"accidents\". Continues to report alternating constipation and diarrhea. Self reports decreased appetite. PO intake variable.  Mostly eating 2 small meals

## 2023-11-21 NOTE — HOSPICE
Arrived at room and patient was sleeping in chair. Oriented to person and time but could recall name of Claudia Favorite. Respirations even and nonlabored. Bowel sounds active. Last BM Monday and she felt she was constipated. Offered sorbitol but she said that she would wait before taking medication. Abdomen soft and nontender. Supplies ordered. Appetite good. Energy level continues to decline. Made oatmeal for patient which she ate and a peanut butter sandwich for lunch. +1 lower extremity edema and pedal pulses positive. No nonverbal signs of pain, agitation or shortness of breath at this time. Family to be updated. Dressing performed  to left breast. No signs of infection noted. Family are visiting daily at this point in time. Told to contact hospice with question, concerns, or change in status.  Joint visit made with Itzel Saenz NP.

## 2023-11-24 ENCOUNTER — HOME CARE VISIT (OUTPATIENT)
Facility: HOME HEALTH | Age: 88
End: 2023-11-24
Payer: MEDICARE

## 2023-11-24 VITALS
HEART RATE: 82 BPM | SYSTOLIC BLOOD PRESSURE: 138 MMHG | OXYGEN SATURATION: 8 % | RESPIRATION RATE: 14 BRPM | DIASTOLIC BLOOD PRESSURE: 82 MMHG | TEMPERATURE: 99.1 F

## 2023-11-24 PROCEDURE — G0299 HHS/HOSPICE OF RN EA 15 MIN: HCPCS

## 2023-11-24 ASSESSMENT — ENCOUNTER SYMPTOMS
COUGH CHARACTERISTICS: NON-PRODUCTIVE
COUGH: 1

## 2023-11-24 NOTE — HOSPICE
Arrived at room and patient was resting her recliner and just finished a bowl of cereal.  Oriented to all spheres. Lungs are clear and diminished in bases. Has a \"runny nose\" and a dry cough. Respirations even and nonlabored. Mp Singer NP ordering Claritin and dextromethorphan. Bowel sounds active. Last BM today. Education performed to patient and staff and they voiced understanding. Abdomen soft and nontender. Appetite is good. + 1 lower extremity edema and pedal pulses positive. No nonverbal signs of pain, agitation or shortness of breath at this time. Told to contact hospice with question, concerns, or change in status. Nursing staff requesting signatures from clinician on older orders. These were sent to \"Consents\" via genius scan. Velma Leach will send to clinician and fax back to facility.

## 2023-11-27 ENCOUNTER — HOME CARE VISIT (OUTPATIENT)
Age: 88
End: 2023-11-27
Payer: MEDICARE

## 2023-11-27 PROCEDURE — G0299 HHS/HOSPICE OF RN EA 15 MIN: HCPCS

## 2023-11-27 NOTE — HOSPICE
Arrived to patients home. She was up in chair eating cereal. Oriented to all spheres. Lungs are clear. Respirations are even and nonlabored. Bowel sounds are active. Abdomen Nontender and soft. Appetite is good. Pedal pulses are positive and there is + 1 lower extremity edema. Denies pain or discomfort today. There has been a pattern over last 10 days of needing prn morphine around 5 pm each day. RN asking that this be scheduled and order was received from Ran Atkins NP. Prefilled syringes will be sent today. No nonverbal signs or symptoms of pain, agitation or shortness of breath. Wound care performed with no signs of infection noted. Told to call hospice with questions concerns and changes in status.

## 2023-11-29 RX ORDER — SENNOSIDES 8.6 MG
TABLET ORAL
Qty: 60 TABLET | Refills: 0 | OUTPATIENT
Start: 2023-11-29

## 2023-11-29 NOTE — TELEPHONE ENCOUNTER
P[facility]   [place]: [phone] ([phone])    Patient Name: Jitendra Johnson  YOB: 1932    Medication Refill Request    Declined - Patient is in Hospice.    Yolande Servin RN  Palliative Medicine       Detail Level: Zone Action 3: Continue Start Regimen: Restart Cosentyx with the loading dosages followed by maintenance dosages.

## 2023-12-01 ENCOUNTER — HOME CARE VISIT (OUTPATIENT)
Facility: HOME HEALTH | Age: 88
End: 2023-12-01
Payer: MEDICARE

## 2023-12-01 PROCEDURE — G0299 HHS/HOSPICE OF RN EA 15 MIN: HCPCS

## 2023-12-03 VITALS — TEMPERATURE: 98.3 F | OXYGEN SATURATION: 97 %

## 2023-12-03 ASSESSMENT — ENCOUNTER SYMPTOMS
PAIN LOCATION - PAIN QUALITY: ACHING
CONSTIPATION: 1

## 2023-12-04 ENCOUNTER — HOME CARE VISIT (OUTPATIENT)
Age: 88
End: 2023-12-04
Payer: MEDICARE

## 2023-12-04 VITALS
TEMPERATURE: 98.2 F | SYSTOLIC BLOOD PRESSURE: 124 MMHG | RESPIRATION RATE: 12 BRPM | HEART RATE: 78 BPM | DIASTOLIC BLOOD PRESSURE: 74 MMHG | OXYGEN SATURATION: 97 %

## 2023-12-04 PROCEDURE — G0299 HHS/HOSPICE OF RN EA 15 MIN: HCPCS

## 2023-12-04 NOTE — HOSPICE
Arrived to patients home. She was up in chair listening to preaching. Oriented to all spheres. Lungs are clear. Respirations re even and nonlabored. Bowel sounds are active. Abdomen Nontender and soft. Appetite is variable. Pedal pulses are positive and there is no lower extremity edema. Denies pain or discomfort. No nonverbal signs or symptoms of pain agitation or shortness of breath. Had recently taken pain medication with good results. Wound care performed. No signs of infection noted. Other skin intact and well moisturized. Daughter concerned about weight loss and decrease in appetite and wants a dietician to see if there is something they can help with as food is important to her mother. Discussed disease progression and early satiety with daughter and she continues to think diet consult will be helpful. Order received from Tona Jaime NP. Told to call hospice with questions concerns and changes in status.

## 2023-12-08 ENCOUNTER — HOME CARE VISIT (OUTPATIENT)
Facility: HOME HEALTH | Age: 88
End: 2023-12-08
Payer: MEDICARE

## 2023-12-08 PROCEDURE — G0299 HHS/HOSPICE OF RN EA 15 MIN: HCPCS

## 2023-12-09 VITALS — SYSTOLIC BLOOD PRESSURE: 153 MMHG | OXYGEN SATURATION: 96 % | HEART RATE: 69 BPM | DIASTOLIC BLOOD PRESSURE: 65 MMHG

## 2023-12-09 ASSESSMENT — ENCOUNTER SYMPTOMS: CONSTIPATION: 1

## 2023-12-09 NOTE — HOSPICE
Routine hospice Sn assessment visit  Patient received sitting in her recliner chair in her apartment  Awake and alert  Responds verbally appropriately No non verbal S/S of pain or shortness of breath noted   Patient states that she is comfortable and that the medication given before her supper is helping so much  The scheduled morphine is effective   Wound care rendered to area left breast per wound care instructions  Patient tolerated wound csre well   No new issues reported bu patient or facility staff   To continue with current hospice plan of care    No medication refills or supplies needed this visit  Informed staff to call hospice 24/7 for concerns / needs   Understanding verbalized

## 2023-12-12 ENCOUNTER — HOME CARE VISIT (OUTPATIENT)
Facility: HOME HEALTH | Age: 88
End: 2023-12-12
Payer: MEDICARE

## 2023-12-12 VITALS
DIASTOLIC BLOOD PRESSURE: 61 MMHG | RESPIRATION RATE: 20 BRPM | OXYGEN SATURATION: 96 % | HEART RATE: 76 BPM | SYSTOLIC BLOOD PRESSURE: 133 MMHG

## 2023-12-12 PROCEDURE — G0299 HHS/HOSPICE OF RN EA 15 MIN: HCPCS

## 2023-12-12 PROCEDURE — G0155 HHCP-SVS OF CSW,EA 15 MIN: HCPCS

## 2023-12-12 ASSESSMENT — ENCOUNTER SYMPTOMS: CONSTIPATION: 1

## 2023-12-12 NOTE — HOSPICE
PRN LMSW conducted a routine visit with pt in the home (assisted living facility). On arrival pt was sitting upright in chair watching TV, alert and oriented x 4, and welcoming of LMSW visit. IBETHSW was new to pt and spent time with pt building rapport with pt through life review, companionship, and providing emotional support. Pt reported that she had no pain aside from the daily pains in her legs due to arthritis. Pt reports that she has a history of constipation that is being addressed through medications, and dietary measures (prune juice, water, intake of fiber). During visit pt ate breakfast which consisted of 100% of a packet of oatmeal and a few sips of water. Pt prefers a soft/low salt diet which can be challenging at times due to the food options in the dinning facility and prefers to eat the majority of her meals in her apartment. During visit pt dtr, Malu arrived to assist pt with preparing a grocery list and to shop on pt behalf. LMSW observed the supportive relationship between dtr/pt as well as their strong patel that provides comfort and coping to them both. No issues or concerns were noted by pt/dtr during this visit. Family and pt were reminded to contact hospice with any questions or concerns. No concerns or needs were reported by facility staff.

## 2023-12-13 NOTE — HOSPICE
Routine hospice Sn assessment visit  Patient received sitting in her recliner chair in her apartment awake, alert, and conversant  She states that she does not feel well,  is constipated  Rn encouraged patient to try Sorbitol this pm  Patient states she might try it but eats  prunes   Skin warm and dry  breath sounds clear throughout, no cough  Denies having acute pain or shortness of breath during visit, abdomen soft and non tender + bowel sounds, does not remember last bm   No peripheral edema noted    Assessment completed  Wound care rendered to left breast area per wound care instructions  Patient tolerated wound care well   No new issues reported by patient or facility staff   Morphine prefilled syringes and Lorazepam refilled by Rx3 and to be sent to facilty this evening   Informed staff to call hospice with concerns / needs   Understanding verbalized    Adding LCD to recert note. Use LCD Guidelines and list features:      __X______ A. Disease with distant metastases at presentation OR     ____X____ B.  Progression from an earlier stage of disease to metastatic disease with either:  ________ 1. continued decline in spite of therapy  ____X____ 2. patient declines further disease directed therapy

## 2023-12-14 ENCOUNTER — HOME CARE VISIT (OUTPATIENT)
Age: 88
End: 2023-12-14
Payer: MEDICARE

## 2023-12-14 VITALS
HEART RATE: 82 BPM | SYSTOLIC BLOOD PRESSURE: 138 MMHG | RESPIRATION RATE: 12 BRPM | DIASTOLIC BLOOD PRESSURE: 77 MMHG | TEMPERATURE: 97.7 F

## 2023-12-14 PROCEDURE — G0299 HHS/HOSPICE OF RN EA 15 MIN: HCPCS

## 2023-12-14 NOTE — HOSPICE
provided routine spiritual care visit to patient at her facility room. Upon arrival, patient was seated in chair, reporting stomach discomfort and mild nausea, denying need for  to request nurse.  provided pastoral presence and conversation around patient's patel beliefs, sources of comfort and  offered prayer.  assisted patient in requesting a nursing aide to assist her in a personal care need. Stephani Abarca will continue to offer spiritual support for companionship and patient's connection to her patel and peace as she journeys toward end of life.

## 2023-12-26 ENCOUNTER — HOME CARE VISIT (OUTPATIENT)
Facility: HOME HEALTH | Age: 88
End: 2023-12-26
Payer: MEDICARE

## 2023-12-26 VITALS
TEMPERATURE: 97.8 F | SYSTOLIC BLOOD PRESSURE: 126 MMHG | RESPIRATION RATE: 12 BRPM | HEART RATE: 78 BPM | DIASTOLIC BLOOD PRESSURE: 76 MMHG | OXYGEN SATURATION: 97 %

## 2023-12-26 PROCEDURE — G0299 HHS/HOSPICE OF RN EA 15 MIN: HCPCS

## 2023-12-26 NOTE — HOSPICE
Arrived at room and patient was in recliner listening to a sermon. Oriented to name all spheres. Mood was good and she had no complaints. Asked that I call son who was visiting and ask him to bring some chicken broth for her to drink and some tomato soup. Respirations even and nonlabored. Bowel sounds active. Last BM yesterday. Abdomen soft and nontender. Abulating slowly with rolator. NO lower extremity edema and pedal pulses positive. No nonverbal signs of pain, agitation or shortness of breath at this time. Had received pain medication and felt this was working was working well. Daughter Wound care performed with NO signs of infection. Length is longer. see wound addendum. Told to contact hospice with question, concerns, or change in status.

## 2023-12-29 ENCOUNTER — HOME CARE VISIT (OUTPATIENT)
Facility: HOME HEALTH | Age: 88
End: 2023-12-29
Payer: MEDICARE

## 2023-12-29 PROCEDURE — G0299 HHS/HOSPICE OF RN EA 15 MIN: HCPCS

## 2024-01-01 ENCOUNTER — HOME CARE VISIT (OUTPATIENT)
Facility: HOME HEALTH | Age: 89
End: 2024-01-01
Payer: MEDICARE

## 2024-01-01 ENCOUNTER — HOME CARE VISIT (OUTPATIENT)
Age: 89
End: 2024-01-01
Payer: MEDICARE

## 2024-01-01 VITALS
SYSTOLIC BLOOD PRESSURE: 153 MMHG | RESPIRATION RATE: 20 BRPM | OXYGEN SATURATION: 96 % | HEART RATE: 73 BPM | DIASTOLIC BLOOD PRESSURE: 82 MMHG

## 2024-01-01 VITALS
SYSTOLIC BLOOD PRESSURE: 108 MMHG | OXYGEN SATURATION: 96 % | TEMPERATURE: 99.9 F | HEART RATE: 87 BPM | DIASTOLIC BLOOD PRESSURE: 60 MMHG | RESPIRATION RATE: 16 BRPM

## 2024-01-01 VITALS
TEMPERATURE: 98.4 F | HEART RATE: 80 BPM | RESPIRATION RATE: 16 BRPM | DIASTOLIC BLOOD PRESSURE: 60 MMHG | SYSTOLIC BLOOD PRESSURE: 106 MMHG

## 2024-01-01 PROCEDURE — G0299 HHS/HOSPICE OF RN EA 15 MIN: HCPCS

## 2024-01-01 PROCEDURE — G0156 HHCP-SVS OF AIDE,EA 15 MIN: HCPCS

## 2024-01-01 RX ORDER — SENNOSIDES 8.6 MG
TABLET ORAL
Qty: 60 TABLET | Refills: 0 | Status: CANCELLED | OUTPATIENT
Start: 2024-01-01

## 2024-01-01 RX ORDER — LORAZEPAM 0.5 MG/1
TABLET ORAL
Qty: 30 TABLET | Refills: 0 | Status: CANCELLED | OUTPATIENT
Start: 2024-01-01

## 2024-01-01 ASSESSMENT — ENCOUNTER SYMPTOMS
CONSTIPATION: 1
BOWEL INCONTINENCE: 1

## 2024-01-02 ENCOUNTER — HOME CARE VISIT (OUTPATIENT)
Facility: HOME HEALTH | Age: 89
End: 2024-01-02
Payer: MEDICARE

## 2024-01-02 PROCEDURE — G0299 HHS/HOSPICE OF RN EA 15 MIN: HCPCS

## 2024-01-02 NOTE — HOSPICE
Routine hospice Sn assessment visit  Patient received sitting in recliner in her apartment awake and alert  Patient states that she does not feel very well,  She appears very anxcious  She denies having acute pain or shortness of breath  She does admit to having anxiety and has nausea   Hospice Rn asked patient if she would like some medication for both her anxiety and nausea  Patient is in agreement to take medication for her symptoms   Hospice Rn discussed symptoms with med tech who will administer the prn Lorazepam and Zofran    Wound care performed to area left breast per wound care instructions  Patient tolerated wound care well   VS within normal limits  To continue with current hospice plan of care  Informed staff to call hospice with concerns / needs  UNderstanding verbalized

## 2024-01-03 ASSESSMENT — ENCOUNTER SYMPTOMS: CONSTIPATION: 1

## 2024-01-03 NOTE — HOSPICE
Routine hospice Sn assessment visit  Patient received sitting in her recliner chair in her room awake and alert  States that she does not feel well  Denies having pain however has nausea for which she reports having been given compazine prior to Rn's arrival  Patient believes the nausea is related to her cancer    Patient is very anxcious this visit   Rn actively listened and provided support as needed    Wound care rendered to area left breast per wound care instructions  Patient tolerated  wound care well   No medication refills or supplies needed this visit  To continue with current hospice plan of care  Informed staff to call hospice with concerns / needs  Understanding verbalized

## 2024-01-05 ENCOUNTER — HOME CARE VISIT (OUTPATIENT)
Facility: HOME HEALTH | Age: 89
End: 2024-01-05
Payer: MEDICARE

## 2024-01-05 VITALS — OXYGEN SATURATION: 92 % | RESPIRATION RATE: 15 BRPM | TEMPERATURE: 98 F

## 2024-01-05 PROCEDURE — G0299 HHS/HOSPICE OF RN EA 15 MIN: HCPCS

## 2024-01-05 NOTE — HOSPICE
Arrived at room and sitting in chair waiting on bath. Stated the Aide was late today and this was upsetting.  Oriented to all spheres.  Respirations even and nonlabored. Bowel sounds active. Last BM today. Stating zofran want working and she wanted to use some tums. Hanny Diaz NP increasing Zofran dose and Tums ordered. Nursing staff/family and patient educated. Also educated on all nausea medication available. A hand written note was left beside her chair regarding nausea medicine as requested. Abdomen soft and nontender. Supplies ordered. Appetite continues to be variable. She states she is getting 3 meals a day from cafeteria and she eats most of the time. She said it was pureed. She noted she was unable to eat it a few times because it didn't taste good. +1 lower extremity edema and pedal pulses positive. No nonverbal signs of pain, agitation or shortness of breath at this time. She was frustrated. Family updated. Order sent to be signed per facility policy. Hydrocodone reordered and escript sent.  Told to contact hospice with question, concerns, or change in status.

## 2024-01-08 ENCOUNTER — HOME CARE VISIT (OUTPATIENT)
Age: 89
End: 2024-01-08
Payer: MEDICARE

## 2024-01-08 VITALS
HEART RATE: 72 BPM | SYSTOLIC BLOOD PRESSURE: 138 MMHG | TEMPERATURE: 98 F | DIASTOLIC BLOOD PRESSURE: 74 MMHG | RESPIRATION RATE: 16 BRPM | OXYGEN SATURATION: 96 %

## 2024-01-08 PROCEDURE — G0299 HHS/HOSPICE OF RN EA 15 MIN: HCPCS

## 2024-01-08 ASSESSMENT — ENCOUNTER SYMPTOMS: CONSTIPATION: 1

## 2024-01-08 NOTE — HOSPICE
Arrived at room and patient was in her recliner waiting on breakfast. She said she get 3 meals daily delivered and sometimes goes to dining room.. She has requested a puree diet as she cannot chew other foods. She had started her oatmeal at end of visit and was going to try the sausage . Her energy level is very low and she felt exhausted. She had not had her morning meds yet. She feels the lorazepam helps with anxiety and realizes he may also make her more tired. She felt that her nausea had improved. Respirations even and nonlabored. Bowel sounds active. Last BM Saturday. Abdomen soft and nontender. Appetite continues to be variable. +1 lower extremity edema and pedal pulses positive. No nonverbal signs of pain, agitation or shortness of breath at this time. Denied pain today. Son to be updated. Relayed message to Jeny Rachel Dietician and Liason Aurora Leach that DON would like to meet with them Wednesday if possible to discus diet. Told to contact hospice with question, concerns, or change in status.

## 2024-01-11 ENCOUNTER — HOME CARE VISIT (OUTPATIENT)
Age: 89
End: 2024-01-11
Payer: MEDICARE

## 2024-01-11 NOTE — HOSPICE
This  conducted routine spiritual care visit, encountering patient in her room, seated in chair, granddaughter visiting.  provided active listening, validation of feelings, and prayer to patient and granddaughter around patient distress and confusion about new meal plan and procedures.  offered reassurance of hospice availability and support and affirmed family's advocacy and support for patient.  assisted patient in articulating sources of comfort and support from her S[employer] patel. Patient welcomes  visit again later in the month.  will offer PRN visit to patient later in month to assist with spiritual comfort and peace.

## 2024-01-12 ENCOUNTER — HOME CARE VISIT (OUTPATIENT)
Facility: HOME HEALTH | Age: 89
End: 2024-01-12
Payer: MEDICARE

## 2024-01-12 PROCEDURE — G0299 HHS/HOSPICE OF RN EA 15 MIN: HCPCS

## 2024-01-13 VITALS — OXYGEN SATURATION: 100 % | DIASTOLIC BLOOD PRESSURE: 79 MMHG | HEART RATE: 75 BPM | SYSTOLIC BLOOD PRESSURE: 158 MMHG

## 2024-01-13 ASSESSMENT — ENCOUNTER SYMPTOMS: CONSTIPATION: 1

## 2024-01-13 NOTE — HOSPICE
[facility] hospice SN assessment visit  Pastient received up by kitchen table getting ready to put baked apples in microwave  Rn assisted with this task    Patient alert and oriented x 4 In good spirits on this visit   Skin warm and dry, breath sounds clear, Abdoment soft and non tender + bowel sounds Bm yesterday    No peripheral edema noted   Wound care rendered to area left breast  per wound care instructions  Patient tolerated wound care well   No new issues reported this visit by staff or patient   Hydrocodone tabs refilled by [facility] to be delivered this afternoon to facility   Informed staff to call hospice with concerns /eeds 24/7   Understanding verbalized

## 2024-01-16 ENCOUNTER — HOME CARE VISIT (OUTPATIENT)
Age: 89
End: 2024-01-16
Payer: MEDICARE

## 2024-01-16 ENCOUNTER — HOME CARE VISIT (OUTPATIENT)
Facility: HOME HEALTH | Age: 89
End: 2024-01-16
Payer: MEDICARE

## 2024-01-16 PROCEDURE — G0299 HHS/HOSPICE OF RN EA 15 MIN: HCPCS

## 2024-01-17 VITALS — SYSTOLIC BLOOD PRESSURE: 160 MMHG | OXYGEN SATURATION: 100 % | DIASTOLIC BLOOD PRESSURE: 74 MMHG | HEART RATE: 80 BPM

## 2024-01-17 ASSESSMENT — ENCOUNTER SYMPTOMS: DIARRHEA: 1

## 2024-01-17 NOTE — HOSPICE
Routine hospice Sn assessment visit  Patient received sitting in her chair in her apartment awake and alert  Smiled at hospice Rn  In good spirits this visit  Requesting that nurse fix yogurt and mandrian oranges which are in  the refrigerator  Patient reports that she is unable to eat some of the facility foods and likes her own snacks    No S/S of pain or shortness of breath noted this visit  Patient denies having any discomfort currently  She reports that she has been requesting her pain medicstion, hydrocodone, every day at 2pm and would like for ot to be scheduled    Call placed to hospice NP, Hanny Diaz and order received to give med daily at 2pm scheduled   Wound care rendered to left breast area per wound care instructions  Patient tolerated wound care well  No other new issues currently  Informed staff to call hospice with concerns / needs  Understanding verbalized

## 2024-01-18 ENCOUNTER — HOME CARE VISIT (OUTPATIENT)
Facility: HOME HEALTH | Age: 89
End: 2024-01-18
Payer: MEDICARE

## 2024-01-18 VITALS
RESPIRATION RATE: 14 BRPM | OXYGEN SATURATION: 94 % | DIASTOLIC BLOOD PRESSURE: 72 MMHG | SYSTOLIC BLOOD PRESSURE: 118 MMHG | TEMPERATURE: 98 F | HEART RATE: 76 BPM

## 2024-01-18 PROCEDURE — G0299 HHS/HOSPICE OF RN EA 15 MIN: HCPCS

## 2024-01-18 ASSESSMENT — ENCOUNTER SYMPTOMS: CONSTIPATION: 1

## 2024-01-22 ENCOUNTER — HOME HEALTH/ HOSPICE FACE TO FACE (OUTPATIENT)
Age: 89
End: 2024-01-22

## 2024-01-22 ENCOUNTER — HOME CARE VISIT (OUTPATIENT)
Age: 89
End: 2024-01-22
Payer: MEDICARE

## 2024-01-22 VITALS
HEART RATE: 79 BPM | DIASTOLIC BLOOD PRESSURE: 68 MMHG | TEMPERATURE: 98 F | SYSTOLIC BLOOD PRESSURE: 118 MMHG | RESPIRATION RATE: 14 BRPM

## 2024-01-22 PROCEDURE — G0299 HHS/HOSPICE OF RN EA 15 MIN: HCPCS

## 2024-01-22 ASSESSMENT — ENCOUNTER SYMPTOMS: CONSTIPATION: 1

## 2024-01-22 NOTE — FACE TO FACE
[facility]   Good Help to Those in Need  FACE TO FACE ENCOUNTER  [phone]     Patient Name: Jitendra Johnson  YOB: 1932     Date of Face to Face Visit: 1/22/2024     Location of Visit:  [] [facility]           [] [facility]         [] [facility]        [] [facility]           [] [facility] House ([facility])  [] Home         [x] Other:  [facility]     Principle Hospice Diagnosis: Metastatic breast cancer  Related Hospice diagnosis: Chronic pain, dyspnea  Other Hospice diagnosis: Severe scoliosis, cerebral aneurysms, intracranial atherosclerotic disease, seizures, AMS, HTN, mild carotid stenosis, GERD     Benefit period number: 4  First day of this BP benefit period: 2/18/2024      HOSPICE SUMMARY       Jitendra Johnson is a 89 y.o. year old  female who is being evaluated for re-certification for Hospice services. Since admission to Hospice on 6/23/2023 for metastatic breast cancer, the patient has demonstrated the following signs/symptoms: pain, increased weakness/debility, anorexia, anxiety, alternating constipation/diarrhea, nausea, and fungating L breast wound.     Routine home visit today in conjunction with RNCM for F2F evaluation. Pt observed sitting in recliner in sitting area of room. She is alert and able to answer orientation questions appropriately x4; but demonstrates poor short term recall and is unreliable historian. Continues to demonstrate periods of increased anxiety. Receives scheduled Lorazepam twice daily. Review of skilled nursing notes indicates that she often appears fatigued and continues to subjectively report increased generalized weakness. Having increased difficulty with sit to stand transition. Has suffered 1 fall within the last four months. Ambulates with rollator. Requires assistance with bathing and grooming. Functional incontinence. Wears adult briefs in case of \"accidents\". Continues to report alternating constipation and diarrhea. Self reports decreased appetite. PO intake

## 2024-01-22 NOTE — HOSPICE
Arrived at room and patient was watching tv.  Hanny Diaz NP in for a visit. Oriented to all spheres. Mechanical soft diet delivered but patient felt it did not taste good and had too much salt. Heated up  [place] and she ate 1/2 can.  Respirations even and nonlabored. Bowel sounds active. Last BM yesterday. Abdomen soft and nontender. Supplies unpacked. Wound care performed with no signs of infection to left breast. NO lower extremity edema and pedal pulses positive. No nonverbal signs of pain, agitation or shortness of breath at this time. Med rec completed and Zofran was incorrect dose. Charge nurse aware and will contact pharmacy to resolve the discrepancy. Told to contact hospice with question, concerns, or change in status.

## 2024-01-25 ENCOUNTER — HOME CARE VISIT (OUTPATIENT)
Age: 89
End: 2024-01-25
Payer: MEDICARE

## 2024-01-25 NOTE — HOSPICE
This  provided PRN visit to patient based on previous visit where patient was in emotional distress.  encountered patient in her facility apartment, seated in chair, son and daughter in law visiting.  provided active, empathetic listening as patient shared about her ongoing frustration with  at her facility.  engaged patient and family in reflection upon the blessings of patel and family support and offered prayer for patient's concerns and gratitudes.  will continue to offer spiritual support for patient's connection to her [employer] patel and need for emotional support.

## 2024-01-26 ENCOUNTER — HOME CARE VISIT (OUTPATIENT)
Facility: HOME HEALTH | Age: 89
End: 2024-01-26
Payer: MEDICARE

## 2024-01-26 PROCEDURE — G0299 HHS/HOSPICE OF RN EA 15 MIN: HCPCS

## 2024-01-28 VITALS
RESPIRATION RATE: 20 BRPM | SYSTOLIC BLOOD PRESSURE: 104 MMHG | DIASTOLIC BLOOD PRESSURE: 54 MMHG | OXYGEN SATURATION: 96 % | HEART RATE: 72 BPM

## 2024-01-30 ENCOUNTER — HOME CARE VISIT (OUTPATIENT)
Facility: HOME HEALTH | Age: 89
End: 2024-01-30
Payer: MEDICARE

## 2024-01-30 PROCEDURE — G0299 HHS/HOSPICE OF RN EA 15 MIN: HCPCS

## 2024-01-31 ENCOUNTER — HOME CARE VISIT (OUTPATIENT)
Age: 89
End: 2024-01-31
Payer: MEDICARE

## 2024-01-31 VITALS
HEART RATE: 76 BPM | RESPIRATION RATE: 20 BRPM | DIASTOLIC BLOOD PRESSURE: 67 MMHG | SYSTOLIC BLOOD PRESSURE: 131 MMHG | OXYGEN SATURATION: 95 %

## 2024-01-31 ASSESSMENT — ENCOUNTER SYMPTOMS
CONSTIPATION: 1
PAIN LOCATION - PAIN QUALITY: ACHING

## 2024-01-31 NOTE — HOSPICE
Routine Hospice SN assessment visit  Patient received sitting in chair in her room awake and alert  Son and daughter in law visiting patient   Judy states that she doesn't feel well  She states the Hydrocodone is not as effective in relieving her pain now as well as the nausea  She continues to receive Zofran 8 mg Q 6 hours as needed for nausea  which she reports does not work either   Discussed issue with son and daughter in law  Patient states the Morphine is more helpful  Rn informed patient that she has an order for Morphine prn for pain not relieved by Hydrocodone    Will discuss issues with RN CM   Patient states the Hydrocodone does help at bedtime however  Wound care rendered to wound left breast area per wound care instructions  Patient tolerated wound care well    Granddaughter left a note in patient's room regarding patient's diet which she wanted hospice RN to give to nurse CATE   She is requesting a regular diet again and that patient may have anything that she wants    Son and daughter in law visiting also request the same   Patient is not currently happy with her current meals mechanical soft diet    She does not  like her foods chopped or cut up     Note was given to DON of facility and discussed at length  She will confer with dietician and attempt to provide foods which patient enjoys   Hospice to continue to work with facility in meeting patient's needs and will  F/U on pain. nausea and diet change to regular     Informed staff as well as son to call hospice with concerns / needs  Understanding verbalized      Medication refills needed      Hydrocodone 2pm dose to be delivered to facility this afternoon from [facility]      No supplies needed this visit

## 2024-02-02 ENCOUNTER — HOME CARE VISIT (OUTPATIENT)
Facility: HOME HEALTH | Age: 89
End: 2024-02-02
Payer: MEDICARE

## 2024-02-02 VITALS
SYSTOLIC BLOOD PRESSURE: 132 MMHG | RESPIRATION RATE: 14 BRPM | TEMPERATURE: 98 F | HEART RATE: 70 BPM | DIASTOLIC BLOOD PRESSURE: 82 MMHG

## 2024-02-02 PROCEDURE — G0299 HHS/HOSPICE OF RN EA 15 MIN: HCPCS

## 2024-02-02 NOTE — HOSPICE
Arrived to patients room and she was up in chair. Wound care performed with no signs of infection Had some bleeding earlier which stopped and there is no pain or discomfort in this area. She has overall exhaustion and an increase in hand tremors. Educating performed on bowel regimen, nausea and pain. Patient stated she understood and did not need any changes. DON and Nursing supervisor came in and patient stated that regular diet that was ordered Tuesday is what she wants she will order off menu and or request tua/.chicken/pimento cheese. If she needs help cutting food up she will ask the person who delivers it. Oriented to all spheres. Lungs are clear. Respirations are even and nonlabored. Bowel sounds are active. Abdomen Nontender and soft. Appetite is variable. Pedal pulses are positive and there is no lower extremity edema. Denies pain or discomfort. No nonverbal signs or symptoms of pain agitation or shortness of breath. Granddaughter at visit. Told to call hospice with questions concerns and changes in status.

## 2024-02-05 ENCOUNTER — HOME CARE VISIT (OUTPATIENT)
Age: 89
End: 2024-02-05
Payer: MEDICARE

## 2024-02-05 VITALS
SYSTOLIC BLOOD PRESSURE: 122 MMHG | HEART RATE: 72 BPM | DIASTOLIC BLOOD PRESSURE: 84 MMHG | TEMPERATURE: 98 F | RESPIRATION RATE: 16 BRPM

## 2024-02-05 PROCEDURE — G0299 HHS/HOSPICE OF RN EA 15 MIN: HCPCS

## 2024-02-05 ASSESSMENT — ENCOUNTER SYMPTOMS: CONSTIPATION: 1

## 2024-02-06 NOTE — HOSPICE
Arrived to patients home. She was up in chair drinking coffee. Oriented to name and time, place. Lungs are clear. Respirations are even and nonlabored. Bowel sounds are active. Abdomen Nontender and soft. Appetite is variable. Pedal pulses are positive and there is +1 lower extremity edema. Asked to elevate feet. Lotion applied to feet. States norco is no helping with pain and discomfort anymore. Pain is generalized but mainly below breasts and in abdomen. States the morphine helps the best. Complaints of runny nose. Hanny Diaz adjusting pain medication. See emar and flonase was ordered for runny nose. Skin intact and well moisturized. Told to call hospice with questions concerns and changes in status. e scripts sent facility pharmacy. gave orders to DON.

## 2024-02-08 ENCOUNTER — HOME CARE VISIT (OUTPATIENT)
Age: 89
End: 2024-02-08
Payer: MEDICARE

## 2024-02-08 VITALS
SYSTOLIC BLOOD PRESSURE: 140 MMHG | TEMPERATURE: 98 F | RESPIRATION RATE: 14 BRPM | DIASTOLIC BLOOD PRESSURE: 80 MMHG | HEART RATE: 70 BPM

## 2024-02-08 PROCEDURE — G0299 HHS/HOSPICE OF RN EA 15 MIN: HCPCS

## 2024-02-08 NOTE — HOSPICE
provided routine spiritual care visit to patient at her facility room. Patient was seated, having finished a lunch that son and daughter in law had brought.  provided pastoral presence and supportive conversation with patient and family. Patient shared about the support she experiences from her patel and from her family.  offered a prayer.  will continue to offer spiritual support for patient's sense of spiritual comfort and peace.

## 2024-02-08 NOTE — HOSPICE
Wound care performed with no signs of infection. Patient tolerated well. Ask ed to increase constipation medication and stop morning morphine. Hanny Diaz NP call and orders were obtained. Sent to have docusigned and given to nurse on duty. Message left for son Gerda as an update. Hanny Diaz NP called about +1 edema to LE and she stated to have patient elevate legs and to monitor. Told to call hospice with questions concerns and changes in status.Offered HHA services but they are declined.

## 2024-02-11 ENCOUNTER — HOME CARE VISIT (OUTPATIENT)
Facility: HOME HEALTH | Age: 89
End: 2024-02-11
Payer: MEDICARE

## 2024-02-11 ENCOUNTER — HOME CARE VISIT (OUTPATIENT)
Age: 89
End: 2024-02-11
Payer: MEDICARE

## 2024-02-11 VITALS
HEART RATE: 77 BPM | TEMPERATURE: 98.1 F | RESPIRATION RATE: 18 BRPM | SYSTOLIC BLOOD PRESSURE: 133 MMHG | DIASTOLIC BLOOD PRESSURE: 77 MMHG | OXYGEN SATURATION: 97 %

## 2024-02-11 PROCEDURE — G0299 HHS/HOSPICE OF RN EA 15 MIN: HCPCS

## 2024-02-13 ENCOUNTER — HOME CARE VISIT (OUTPATIENT)
Facility: HOME HEALTH | Age: 89
End: 2024-02-13
Payer: MEDICARE

## 2024-02-13 VITALS
OXYGEN SATURATION: 97 % | DIASTOLIC BLOOD PRESSURE: 72 MMHG | HEART RATE: 76 BPM | SYSTOLIC BLOOD PRESSURE: 149 MMHG | RESPIRATION RATE: 18 BRPM

## 2024-02-13 PROCEDURE — G0299 HHS/HOSPICE OF RN EA 15 MIN: HCPCS

## 2024-02-14 NOTE — HOSPICE
Routine hospice SN assesment visit  Patient sitting in chair in her room awake and alert Seems in good spirits this visit  Smiled at visit RN  No S/S of acute pain or shortness of breath  Patient denies having pain or shortness of breath  Current new pain medication management plan for Morphine 10 mg scheduled has been very effective  Hydrocodone has been discontinued   Skin is warm and dry  Breath sounds clear theoughout, no cough  Abdomen soft and non tender + bowel sounds  Last bm today  1+ edema bilateral ankles  Encouraged patient to elevate her legs as tolerated for the swelling in her feet  Understanding verbalized  Wound care performed to area left breast per wound care instructions  Patient tolerated wound care well    No new issues since last hospice Sn assessment visit  To continue with current hospice plan of care   Informed staff to call hospice with concerns / needs  Understanding verbalized    Reviewed current medications with med [place]   2 med refills ordered from [facility] to be delivered to facility this afternoon   Lorazepam 0.5 mg scheduked 2x daily and Compazine 10 mg daily at 2pm

## 2024-02-16 ENCOUNTER — HOME CARE VISIT (OUTPATIENT)
Age: 89
End: 2024-02-16
Payer: MEDICARE

## 2024-02-16 VITALS
SYSTOLIC BLOOD PRESSURE: 136 MMHG | RESPIRATION RATE: 14 BRPM | DIASTOLIC BLOOD PRESSURE: 82 MMHG | HEART RATE: 62 BPM | TEMPERATURE: 98 F

## 2024-02-16 PROCEDURE — G0299 HHS/HOSPICE OF RN EA 15 MIN: HCPCS

## 2024-02-16 NOTE — HOSPICE
Arrived to patients room and she was in chair listening to a preacher. Oriented to all spheres.Respirations re even and nonlabored. Bowel sounds are active. Abdomen Nontender and soft. Appetite is variable. Pedal pulses are positive and there is no lower extremity edema. Denies pain or discomfort. No nonverbal signs or symptoms of paim agitation or shortness of breath. Wound care performed to left breast see addendum. Compazine order given to staff as a renewal.  Told to call hospice with questions concerns and changes in status.

## 2024-02-20 ENCOUNTER — HOME CARE VISIT (OUTPATIENT)
Facility: HOME HEALTH | Age: 89
End: 2024-02-20
Payer: MEDICARE

## 2024-02-20 VITALS
SYSTOLIC BLOOD PRESSURE: 110 MMHG | TEMPERATURE: 98.4 F | HEART RATE: 73 BPM | DIASTOLIC BLOOD PRESSURE: 60 MMHG | RESPIRATION RATE: 12 BRPM

## 2024-02-20 PROCEDURE — G0299 HHS/HOSPICE OF RN EA 15 MIN: HCPCS

## 2024-02-20 NOTE — HOSPICE
Arrived to patients home. She was up in chair visiting with family. Oriented to all pheres. Lungs are clear. Respirations are even and nonlabored. Bowel sounds are active. Abdomen Nontender and soft. Appetite is variable. 1 CM Mac scroe decrease noted. She stated her diet was going ok. She suppliment with her own groceries when there was something she didnt like.  Pedal pulses are positive and there is +1 lower extremity edema. Denies pain or discomfort. No nonverbal signs or symptoms of pain agitation or shortness of breath. Wound care to left breast performed with an increase in size noted. See wound addendum. Enlarged nodules to  left axilla with no open areas or discomfort. Told to call hospice with questions concerns and changes in status.

## 2024-02-21 ENCOUNTER — HOME CARE VISIT (OUTPATIENT)
Facility: HOME HEALTH | Age: 89
End: 2024-02-21
Payer: MEDICARE

## 2024-02-21 PROCEDURE — G0155 HHCP-SVS OF CSW,EA 15 MIN: HCPCS

## 2024-02-21 NOTE — HOSPICE
Missed visit due to visit set ending. LMSW will make visit prior to end of month. Hospice MD Notified.

## 2024-02-22 ENCOUNTER — HOME CARE VISIT (OUTPATIENT)
Age: 89
End: 2024-02-22
Payer: MEDICARE

## 2024-02-22 VITALS
SYSTOLIC BLOOD PRESSURE: 130 MMHG | TEMPERATURE: 98.1 F | DIASTOLIC BLOOD PRESSURE: 80 MMHG | RESPIRATION RATE: 14 BRPM | HEART RATE: 80 BPM

## 2024-02-22 PROCEDURE — G0299 HHS/HOSPICE OF RN EA 15 MIN: HCPCS

## 2024-02-22 NOTE — HOSPICE
Wound car visit performed. Please see addendum. No signs of infection and no open areas to nodules in axilla. No pain to wound noted and tolerated procedure well. Asked to have pain medication in the morning as she stats her pain is severe all over in the morning - 6/10. Order obtained from Hanny soliz NP. see emar. escripts sent to [facility] pharmacy and she has rerfilled syrringes available.Son Gerda updated. Told to call hospice with questions, concerns and changes in status.

## 2024-02-22 NOTE — HOSPICE
LMSW made visit for socialization and support. Upon arrival, patient was seated in her living room chair along with arsenio Javed and son in law Flavio visiting from [place]. LMSW inquired about needs. Patient reported neck pain at 7/10 and allergy issues at night. LMSW offered to follow up with ADIS Mclaughlin. LMSW provided socialization to patient who easily engaged. Patient reflected on good family support and utilizing her patel to cope. Daughter Tegan reported that her mother in law  earlier this month. LMSW checked in with facility staff who reported no additional concerns. LMSW followed up with ADIS Mclaughlin about patient's needs. LMSW will continue to offer support and will remain available to address needs that arise.

## 2024-02-27 ENCOUNTER — HOME CARE VISIT (OUTPATIENT)
Age: 89
End: 2024-02-27
Payer: MEDICARE

## 2024-02-27 ENCOUNTER — HOME CARE VISIT (OUTPATIENT)
Facility: HOME HEALTH | Age: 89
End: 2024-02-27
Payer: MEDICARE

## 2024-02-27 VITALS
HEART RATE: 72 BPM | DIASTOLIC BLOOD PRESSURE: 70 MMHG | RESPIRATION RATE: 12 BRPM | TEMPERATURE: 98.4 F | SYSTOLIC BLOOD PRESSURE: 148 MMHG

## 2024-02-27 PROCEDURE — G0299 HHS/HOSPICE OF RN EA 15 MIN: HCPCS

## 2024-02-27 NOTE — HOSPICE
This  provided routine spiritual care visit to patient at her facility room for spiritual comfort. The patient was seated in her reclining chair, reporting that she was feeling \"weak\" and noting that she had experienced bad sciatic pain in her leg upon ambulating with rolater to make a cup of coffee.  made patient a cup of coffee and adjusted her air conditioner.  engaged patient in reflection up on comforting spiritual beliefs and practices, read scripture, sang a hymn, and provided active listening and validation of feelings. The patient shared about her patel, family members, engaged in reminiscing about raising her children, and expressed enjoyment of  regular visits to her.  will continue to offer spiritual support for patient's connection to her spiritual beliefs, companionship, and moments of chery.

## 2024-02-27 NOTE — HOSPICE
Arrived to patients home. She was up in chair visiting waiting for family. Oriented to all spheres. Lungs are clear. Respirations re even and nonlabored. Bowel sounds are active. Abdomen Nontender and soft. Appetite is good. Pedal pulses are positive with +1 lower extremity edema. Ed ucated on elevating them. Denies pain or discomfort. No nonverbal signs or symptoms of teresa agitation or shortness of breath. ound care performed with no signs of infection. See addendum.  concerns and changes in status.

## 2024-02-28 ENCOUNTER — HOME CARE VISIT (OUTPATIENT)
Age: 89
End: 2024-02-28
Payer: MEDICARE

## 2024-02-28 VITALS
SYSTOLIC BLOOD PRESSURE: 120 MMHG | TEMPERATURE: 98.1 F | RESPIRATION RATE: 14 BRPM | DIASTOLIC BLOOD PRESSURE: 78 MMHG | HEART RATE: 80 BPM

## 2024-02-28 PROCEDURE — G0299 HHS/HOSPICE OF RN EA 15 MIN: HCPCS

## 2024-02-28 ASSESSMENT — ENCOUNTER SYMPTOMS: SKIN LESIONS: 1

## 2024-02-28 NOTE — HOSPICE
Call received from triage that granddaughter requested visit as patient couldnt walk back form bathroom wthout considerable assistance. Arrived and patient had fallen approximately 5 minutes before arrival back from bathroom. She states she wasnt wearing her shoes and didnt wait for nurse to assist but had used call bell. She sustained a scrape to right wrist that was cleansed with wound cleanser and optifoam applied. 2x2 cm. No bleeding.Speech is clear. NO facial droop noted. Strength to extremities equal. One side is not weaker than the other. ROM to all extremities intact. No pain from fall noted. Hip flexion intact and able to bear weight but stated she was too weak to walk. No cuts, bruises or swelling noted and she states she may have bumped head but has no discomfort. She did have neck discomfort this morning before fall. She sleeps in a chair. She does not want to sleep in bed but may consider trying it in the future.  Aspercreme ordered by Hanny Diaz NP and fall was reported. Bedside Commode ordered and expeditied as she is too weak to walk to bathroom. Assisted with changing of pull ups. She agreed to keep bedside commode in other room so she does not forget and attempt to get up on own. She agreed that she will wait for nurse and wear shoes when ambulating. Son Marion called and updated. He stated he would update rest of family. Told to call hospice with questions, concerns or changes in status.

## 2024-02-29 ENCOUNTER — HOME CARE VISIT (OUTPATIENT)
Age: 89
End: 2024-02-29
Payer: MEDICARE

## 2024-03-01 ENCOUNTER — HOME CARE VISIT (OUTPATIENT)
Facility: HOME HEALTH | Age: 89
End: 2024-03-01
Payer: MEDICARE

## 2024-03-01 PROCEDURE — G0299 HHS/HOSPICE OF RN EA 15 MIN: HCPCS

## 2024-03-02 ENCOUNTER — HOME CARE VISIT (OUTPATIENT)
Age: 89
End: 2024-03-02
Payer: MEDICARE

## 2024-03-02 VITALS
OXYGEN SATURATION: 95 % | TEMPERATURE: 98.6 F | DIASTOLIC BLOOD PRESSURE: 73 MMHG | HEART RATE: 72 BPM | SYSTOLIC BLOOD PRESSURE: 175 MMHG

## 2024-03-02 PROCEDURE — G0299 HHS/HOSPICE OF RN EA 15 MIN: HCPCS

## 2024-03-02 ASSESSMENT — ENCOUNTER SYMPTOMS
SORE THROAT: 1
PAIN LOCATION - PAIN QUALITY: ACHING
CONSTIPATION: 1
STOOL DESCRIPTION: SMALL

## 2024-03-02 NOTE — HOSPICE
Routine hospice SN assessment visit  Staff had called hospice to assess patient today related to increased pain  Upon arrival patient sitting in her recliner chair awake and alert  Appears very fatigued and does show non verbal S/S of pain  Patient has a furrowed brow and facial grimacing   Staff was giving prn Morphine dose 10 mg  as RN arrived to patient's room   Staff reports patient is having increased pain and discomfort, anxiety and weakness  Patient states she is having pain all over and rates at 8-9   The pain is in her legs mostly however hurts all over     After about 20 minutes patient reports having no pain now  She states the prn morphine has taken all of her pain away   Also her overall affect has greatly improved  In good spirits after prn morphine was given    Granddaughter in visiting patient and expressed concern that patient showing general decline   Hospice RN provided education and support on disease process and related decline in status Main hospice goal is for comfort and management of symptoms    Understanding verbalized    Patient is unable to ambulate now and has much difficulty even standing  Using bedside commode beside recliner chair and requires total assist to transfer from chair to commode by assist to stand and pivot to commode It takes  2 to transfer patient safely   Patient comfortable at end  of visit and in good spirits   RN reinforced with staff to monitor patients comfort level and give the PRN morphine Q 3 hours as needed  Will notify triage to check on patient tomorrow for F/U     Granddaughter appreciative of visit and states she ffeels better about her grandmother's status now     Wound care rendered to left breast per wound care instructions  Patient tolerated well  Informed staff to call hospice with concerns / needs 24/7  UNderstanding verbalized       Refilled prefilled prn morphine 10 mg syringes  20 syringes from [facility] to be delivered to facility this evening

## 2024-03-02 NOTE — HOSPICE
Follow up visit on pain control made per request. Patient was up in chair and stated she had morphine about 20 minutes prior and was in no pain. Stated she understood she could ask for it every 3 hours as needed and that it was scheduled in the afternoon and evening. She did not want any other doses scheduled. She was anxious and requested lorazepam due to recent decline in mobility. Oriented to all spheres. Lungs are clear. Respirations were even and nonlabored. Bowel sounds are active. Abdomen Nontender and soft. Appetite regressed. Cleansed scrape to right wrist with no signs of infection. Son Gerda updated. Patient had requested family come and visit over the weekend and this was relayed. Spoke with medication tech who check drawer and there were enough prefilled syringes to last throughout weekend. Told to call hospice with questions concerns and changes in status.

## 2024-03-03 ENCOUNTER — HOME CARE VISIT (OUTPATIENT)
Age: 89
End: 2024-03-03
Payer: MEDICARE

## 2024-03-03 VITALS
OXYGEN SATURATION: 94 % | SYSTOLIC BLOOD PRESSURE: 115 MMHG | RESPIRATION RATE: 20 BRPM | HEART RATE: 95 BPM | DIASTOLIC BLOOD PRESSURE: 55 MMHG | TEMPERATURE: 99.2 F

## 2024-03-03 PROCEDURE — G0299 HHS/HOSPICE OF RN EA 15 MIN: HCPCS

## 2024-03-04 ENCOUNTER — HOME CARE VISIT (OUTPATIENT)
Facility: HOME HEALTH | Age: 89
End: 2024-03-04
Payer: MEDICARE

## 2024-03-04 VITALS
RESPIRATION RATE: 16 BRPM | SYSTOLIC BLOOD PRESSURE: 130 MMHG | DIASTOLIC BLOOD PRESSURE: 68 MMHG | OXYGEN SATURATION: 90 % | HEART RATE: 82 BPM | TEMPERATURE: 98.1 F

## 2024-03-04 PROCEDURE — G0299 HHS/HOSPICE OF RN EA 15 MIN: HCPCS

## 2024-03-04 PROCEDURE — G0155 HHCP-SVS OF CSW,EA 15 MIN: HCPCS

## 2024-03-04 NOTE — HOSPICE
LMSW made visit following 0-7 status. Upon arrival, patient was lying in her recliner surrounded by 5 family members. Point of contact was daughter Malu and patient. Patient was able to verbalize but stated that speaking was difficult. LMSW provided reassurance and encouraged patient to rest. LMSW provided supportive counseling to family and EOL education as well as safety education following patient's desire for water and some food. Patient stated that she was hungry and ate a spoonful of babyfood, some of which came back out of her mouth. LMSW discussed [facility] as possibility if patient's symptoms become unmanageable in current setting. Patient reported pain. LMSW followed up with facility med techs who had questions about patient's order vs medications in facility. LMSW facilitated call between facility staff and triage RN to address order clarifications. LMSW followed up with RNCM. LMSW will continue to offer support and will remain available to address needs that arise.

## 2024-03-04 NOTE — HOSPICE
Arrived to patients room and she was surrounded by family.  Responded to verbal stimuli and spoke a few words. Had recently been medicated for pain and  there were no nonverbal signs of pain, agitation or shortness of breath except when repositioned. Lungs are clear. Respirations are even and nonlabored. o2 via nasal canula intact at 2 liters. Bowel sounds are sluggish and active in left lower quadrant only . Abdomen Nontender and soft. No distention palpated. Appetite has regressed to a bite today. Family states she coughed and some of the baby food came back out. Education performed on aspiration risk and feeding patient when she is unable to swallow effectively. They voiced understanding. Pedal pulses are weak and there is +1 lower extremity edema. Attempted to insert a 16 [place] workman but due to anatamomy and possible spasms, workman could not be inserted. a 14 Maldivian workman is availalbe tomorrow and skilled nurse will attempt to insert during that visit as [facility] staff stated they did not have the staff to toilet or change her every hour as family has requested. Wound care performed to left breast with no signs of infection.  Told to call hospice with questions concerns and changes in status.

## 2024-03-05 ENCOUNTER — HOME CARE VISIT (OUTPATIENT)
Facility: HOME HEALTH | Age: 89
End: 2024-03-05
Payer: MEDICARE

## 2024-03-05 ENCOUNTER — HOME CARE VISIT (OUTPATIENT)
Age: 89
End: 2024-03-05
Payer: MEDICARE

## 2024-03-05 VITALS — SYSTOLIC BLOOD PRESSURE: 134 MMHG | HEART RATE: 85 BPM | DIASTOLIC BLOOD PRESSURE: 67 MMHG

## 2024-03-05 PROCEDURE — G0156 HHCP-SVS OF AIDE,EA 15 MIN: HCPCS

## 2024-03-05 PROCEDURE — G0299 HHS/HOSPICE OF RN EA 15 MIN: HCPCS

## 2024-03-05 ASSESSMENT — ENCOUNTER SYMPTOMS: TROUBLE SWALLOWING: 1

## 2024-03-05 NOTE — HOSPICE
missed visit documentation:  provided two spiritual care visits during the month. No further spiritual care needs requested or reported.

## 2024-03-05 NOTE — HOSPICE
provided routine spiritual care visit to patient who has now transitioned to end of life. Patient was asleep in reclining chair in her facility room with two granddaughters and hospice RN Ayanna present.  engaged granddaughters in life review and meaning-making around the patient's life.  sat with patient who roused to 's voice, playing two of patient's favorite hymns, reading scripture, and offering prayer. The patient presented as very weak but was able to verbalize expressions of gratitude and patel. The patient reported being unafraid of death and having deep comfort from her patel.  will continue to offer spiritual support for patient's spiritual comfort and family member's grief as patient continues to decline.

## 2024-03-06 ENCOUNTER — HOME CARE VISIT (OUTPATIENT)
Facility: HOME HEALTH | Age: 89
End: 2024-03-06
Payer: MEDICARE

## 2024-03-06 PROCEDURE — G0299 HHS/HOSPICE OF RN EA 15 MIN: HCPCS

## 2024-03-06 NOTE — HOSPICE
Hospice 0-7 SN assessment visit and to place workman catheter size 14   Upon arrival of Rn patient received reclining back in her recliner chair beside the bed  Eyes closed Head turned to right side Staff had just administered the Morphine 20 mg with Lorazepam 0,5 mg as prescribed Q 6 hours  Granddaughter. Manuela,  in with patient and had spent the night    She reports that patient became very upset last evening about being in the hospital bed  Saff transferred patient back to her recliner chair where she states she is comfortable and could relax   No non verbal S/S of acute pain or shortness of breath noted  Skin is warm and dry   Breath sounds clear, diminished lower lobes  No secretions noted no cough    Per granddaughter, Manuela, urinary putput has greatly decreased  No oral intake now Unable to swallow   Hospice HHA into provide sponge bath gently and chaged adult brief r/t urinary incontinence    Per granddaughter, Manuela. patient does not need the workman catheter now and it would be too invasive and uncomfortable  RN brought a 14 workman catheter insertion kit if needed    Patient is basically unresponsive sleeping for long periods of time  Arouses infrequently and answers NO to pain or shortness of breath  Nods yes to being comfortable    To continue with this plan of care for comfort at EOL   Education and support provided the family and staff  RN spent a lengthy time with Manuela talking about memories Hospice Chaplain also in to visit and had prayer with patient and family     Staff has enough prefilled Morphine and Lorazepam syringes on hand      Hospice SN visit scheduled for tomorrow  Informed staff and family to call hospice 24/7 with changes, concerns, needs   Understanding verbalized

## 2024-03-07 ENCOUNTER — HOME CARE VISIT (OUTPATIENT)
Age: 89
End: 2024-03-07
Payer: MEDICARE

## 2024-03-07 ENCOUNTER — HOME CARE VISIT (OUTPATIENT)
Facility: HOME HEALTH | Age: 89
End: 2024-03-07
Payer: MEDICARE

## 2024-03-07 VITALS
SYSTOLIC BLOOD PRESSURE: 144 MMHG | HEART RATE: 82 BPM | DIASTOLIC BLOOD PRESSURE: 68 MMHG | TEMPERATURE: 98.4 F | RESPIRATION RATE: 14 BRPM

## 2024-03-07 VITALS — HEART RATE: 90 BPM | OXYGEN SATURATION: 96 % | RESPIRATION RATE: 18 BRPM

## 2024-03-07 PROCEDURE — G0299 HHS/HOSPICE OF RN EA 15 MIN: HCPCS

## 2024-03-07 PROCEDURE — G0156 HHCP-SVS OF AIDE,EA 15 MIN: HCPCS

## 2024-03-07 NOTE — HOSPICE
PRN visit made per family's/facility's request for c/o nose bleed. Upon arrival, Pt sitting in recliner surrounded by family. O2 via nasal canula on top of Pt's head, RR even and unlabored, O2 sats 96% RA. Assessment of nares with minimal amt of dried blood No active bleeding noted, Pt's daughter states that humidification bottle was noted to be without water prior to nose bleed. Discussed the probability that nose bleed was r/t dry nares secondary to use of O2 and lack of moisture/humidification Encouraged facility staff and family to ensure proper water level in humidification bottle and monitor Pt for further nose bleeds. Communication sent to  Lissette to consider use of PRN saline nasal gel for nasal dryness. Encouraged family and staff to call [facility] if needed

## 2024-03-07 NOTE — HOSPICE
0-7 days hospice SN assessment  Patient received up in her recliner chair with her eyes closed and head turned far to the right , her chin touching her chest   Lower extremities are elevated Skin is warm and dry  No non verbal S/S of pain or shortness of breath on nasal 02 2L  Staff continues to give the scheduled Morphine 20 mg with the scheduled Lorazepam 0,5 mg  Q 6 hours   Infrequently patient opens her eyes and attempts to talk  Speech is difficult to understand  Patient very drowsy and confused   Hallucination at times  Seeing people not there  Breath sounds clear, no secretions noted   Right hand swollen and is elevated on a pillow   No edema lower extremities noted  No mottling     Large family gathering in patient's room  RN spent lengthy visit with the family for education and support  Appreciation expresed for hospice care and support    To continue with comfort care at EOL    Informed staff as well as family to call hospice with concerns / needs   UNderstanding verbalized

## 2024-03-08 ENCOUNTER — HOME CARE VISIT (OUTPATIENT)
Facility: HOME HEALTH | Age: 89
End: 2024-03-08
Payer: MEDICARE

## 2024-03-08 ENCOUNTER — HOME CARE VISIT (OUTPATIENT)
Age: 89
End: 2024-03-08
Payer: MEDICARE

## 2024-03-08 VITALS
SYSTOLIC BLOOD PRESSURE: 110 MMHG | HEART RATE: 88 BPM | RESPIRATION RATE: 16 BRPM | OXYGEN SATURATION: 97 % | DIASTOLIC BLOOD PRESSURE: 60 MMHG | TEMPERATURE: 98.5 F

## 2024-03-08 PROCEDURE — G0299 HHS/HOSPICE OF RN EA 15 MIN: HCPCS

## 2024-03-08 NOTE — HOSPICE
PRN visit made per family's request for perceived decline in status. Upon arrival , Pt sitting in recliner, AOX2. Denies pain, RR even and unlabored, VSS, 98.5, 110/60, 88, 16, 97% on 2L Pt had been medicated with PRN Morphine and Lorazepam prior to nurse's visit for symptom management of anxiety and pain. Reviewed Pt's meds and administration with Med tech Whit Smallwood and Pt's family. Educated family on side effects of medications including changes in mental status, sedation, decreased respiratory rate and effort. At end of visit family stated understanding, Pt A&O x1-2, speech clear, no c/o voiced.

## 2024-03-08 NOTE — HOSPICE
Arrived at room and patient was resting quietly in chair surrounded by family.  Verbal and said the Lords prayer with her nurse. Napping on and off throughout visit. Stated she thought they were coming to get her later today. Has been having some visual hallucinations per granddaughter. Stated she was very tired and ready to pass.  Prefilled syringes to last through the weekend ordered from [facility]. Bowel sounds noted to right lower quadrant. Abdomen soft and nontender. No eating or drinking. No lower extremity edema and pedal pulses weak and  palpable. No nonverbal signs of pain, agitation or shortness of breath at this time. Med rec completed. Education related to end of life care and SRK medication performed. No signs of infection. Wound care to left breast performed.  Told to call hospice with question, concerns, or change in status.

## 2024-03-09 ENCOUNTER — HOME CARE VISIT (OUTPATIENT)
Age: 89
End: 2024-03-09
Payer: MEDICARE

## 2024-03-09 VITALS — TEMPERATURE: 97.9 F | OXYGEN SATURATION: 99 % | HEART RATE: 85 BPM | RESPIRATION RATE: 14 BRPM

## 2024-03-09 PROCEDURE — G0299 HHS/HOSPICE OF RN EA 15 MIN: HCPCS

## 2024-03-09 NOTE — HOSPICE
Daily visit for 89yo  female with a diagnosis of Carcinoma of the breast. Upon arrival, patient lying in hospital bed transitioning with no signs/symptoms of discomfort. Patient surrounded by family. VS, stable. Lungs diminished, but CTA bilaterally. Bowel sounds hypoactive x4 quads. Patient incontinent of bowel/bladder. Throughout the assessment, patient would call out for family. This RN offered reassurance and told the patient that she is surrounded by family, and family assured her that it is okay for her to rest. This RN spent a considerable amount of time explaining EOL expectations to the family. This RN advised that if there is a change in status, to reach out to facility staff first. Morphine/Lorazepam are scheduled every 6 hours at this time. No other concerns at this time. This RN advised facility staff to contact [facility] for any additional questions, concerns, or change in status.

## 2024-03-10 ENCOUNTER — HOME CARE VISIT (OUTPATIENT)
Facility: HOME HEALTH | Age: 89
End: 2024-03-10
Payer: MEDICARE

## 2024-03-10 VITALS
DIASTOLIC BLOOD PRESSURE: 65 MMHG | RESPIRATION RATE: 20 BRPM | TEMPERATURE: 99 F | HEART RATE: 88 BPM | SYSTOLIC BLOOD PRESSURE: 113 MMHG | OXYGEN SATURATION: 97 %

## 2024-03-10 VITALS
TEMPERATURE: 98.7 F | DIASTOLIC BLOOD PRESSURE: 70 MMHG | SYSTOLIC BLOOD PRESSURE: 122 MMHG | HEART RATE: 70 BPM | RESPIRATION RATE: 18 BRPM

## 2024-03-10 PROCEDURE — G0299 HHS/HOSPICE OF RN EA 15 MIN: HCPCS

## 2024-03-12 NOTE — HOSPICE
provided routine spiritual care visit to patient who is nearing end of life. Patient was in her bed, nurse and family member providing care, with other family members in her facility room.  engaged with family in illness review, grief support, and active listening.  sat with patient providing prayer, affirmation of her patel and legacy and compassionate presence. Patient's eyes remained closed but exhibited fluttering of her eyelids upon 's greeting. Family expressed thanks for 's spiritual support and visit.  will continue to offer spiritual support for patient's spiritual comfort and peace as she continues to terminally decline.

## 2024-03-12 NOTE — HOSPICE
0-7 status. Arrived at room and patient was surrounded by family.  She is unresponsive. Stage 2 pressure area cleansed with wound cleanser and incont care performed. She was repositioned. Education on positioning performed with family.  Respirations even and nonlabored. Bowel sounds inactive. Abdomen soft and nontender. 02 intact via nasal canula at 2 liters. 6 days with no eating or drinking. NO lower extremity edema and pedal pulses weak. No nonverbal signs of pain, agitation or shortness of breath at this time. Told to contact hospice with question, concerns, or change in status.

## 2024-03-12 NOTE — HOSPICE
0-7 days hospice Sn assessment visit EOL   Upon arrival of RN large family gathering in patient's room  Patient is unresponsive to tactile stimuli   Eyes closed No S/S of acute pain or shortness of breath on nasal 02 2L Skin is warm to touch  low grade fever 99,9 temporal   Staff continues to administer Morphine 20 mg with Lorazepam 0,5 mg scheduled Q 6 hours and Q 3 hours prn which has been effective  Patient is well palliated on current pain regimen   Education and support provided the family as needed   Caregivers using moistened oral swabs to keep patient's mouth moistened  RN and staff med tech  checked medication supply in the facility  No symptom med refills needed at this time  To continue with current hospice  plan of care for comfort and safety   Informed staff / family to call hospice with concerns / needs 24/7  Understanding verbalized

## 2024-03-14 ENCOUNTER — HOME CARE VISIT (OUTPATIENT)
Age: 89
End: 2024-03-14
Payer: MEDICARE

## 2024-03-14 VITALS
DIASTOLIC BLOOD PRESSURE: 61 MMHG | TEMPERATURE: 99.9 F | RESPIRATION RATE: 44 BRPM | SYSTOLIC BLOOD PRESSURE: 76 MMHG | HEART RATE: 113 BPM

## 2024-03-14 ASSESSMENT — ENCOUNTER SYMPTOMS: BOWEL INCONTINENCE: 1

## 2024-03-14 NOTE — HOSPICE
Patient pronounced at 11:39 PM. Family at bedside report that death was very peaceful. All seem to be grieving appropriately and very supportive of each other. Postmortem care done. Meds wasted by facility. [facility] notified to remove body. Dr. Garcias notified by email. Debbi Walker NP notified by CC messaging. Unable to notify Dr. Abad, PCP, but will have triage notify during office hours. DME company to be notified by triage. Offered support and encouraged family to call hospice anytime with any questions or concerns.

## 2024-03-15 NOTE — HOSPICE
Hospice 0-7 days SN assessment visit   Large family gathering in patient's room providing support for each other at EOL  Patient is well palliated on current plan of treatment No S/S of pain or shortness of breath  Respirations are shallow now and patient is totally unresponsive to tactile stumuli   Hospice RN spent time with the family providing education and support  Appreciation expressed for the visit and time spent   Infrmed family to call hospice for any concerns / needs at EOL  Understanding verbalized